# Patient Record
Sex: MALE | Race: WHITE | Employment: PART TIME | URBAN - METROPOLITAN AREA
[De-identification: names, ages, dates, MRNs, and addresses within clinical notes are randomized per-mention and may not be internally consistent; named-entity substitution may affect disease eponyms.]

---

## 2018-01-01 ENCOUNTER — APPOINTMENT (OUTPATIENT)
Dept: GENERAL RADIOLOGY | Age: 83
DRG: 330 | End: 2018-01-01
Attending: FAMILY MEDICINE
Payer: MEDICARE

## 2018-01-01 ENCOUNTER — APPOINTMENT (OUTPATIENT)
Dept: INFUSION THERAPY | Age: 83
End: 2018-01-01

## 2018-01-01 ENCOUNTER — APPOINTMENT (OUTPATIENT)
Dept: GENERAL RADIOLOGY | Age: 83
DRG: 389 | End: 2018-01-01
Attending: FAMILY MEDICINE
Payer: MEDICARE

## 2018-01-01 ENCOUNTER — OFFICE VISIT (OUTPATIENT)
Dept: ONCOLOGY | Age: 83
End: 2018-01-01

## 2018-01-01 ENCOUNTER — HOME CARE VISIT (OUTPATIENT)
Dept: HOME HEALTH SERVICES | Facility: HOME HEALTH | Age: 83
End: 2018-01-01
Payer: MEDICARE

## 2018-01-01 ENCOUNTER — HOME CARE VISIT (OUTPATIENT)
Dept: SCHEDULING | Facility: HOME HEALTH | Age: 83
End: 2018-01-01
Payer: MEDICARE

## 2018-01-01 ENCOUNTER — APPOINTMENT (OUTPATIENT)
Dept: CT IMAGING | Age: 83
DRG: 330 | End: 2018-01-01
Attending: INTERNAL MEDICINE
Payer: MEDICARE

## 2018-01-01 ENCOUNTER — TELEPHONE (OUTPATIENT)
Dept: ONCOLOGY | Age: 83
End: 2018-01-01

## 2018-01-01 ENCOUNTER — APPOINTMENT (OUTPATIENT)
Dept: GENERAL RADIOLOGY | Age: 83
DRG: 389 | End: 2018-01-01
Attending: SURGERY
Payer: MEDICARE

## 2018-01-01 ENCOUNTER — OFFICE VISIT (OUTPATIENT)
Dept: SURGERY | Age: 83
End: 2018-01-01

## 2018-01-01 ENCOUNTER — HOSPITAL ENCOUNTER (OUTPATIENT)
Dept: INFUSION THERAPY | Age: 83
End: 2018-01-01
Payer: MEDICARE

## 2018-01-01 ENCOUNTER — DOCUMENTATION ONLY (OUTPATIENT)
Dept: ONCOLOGY | Age: 83
End: 2018-01-01

## 2018-01-01 ENCOUNTER — HOSPITAL ENCOUNTER (OUTPATIENT)
Age: 83
Discharge: HOME HEALTH | End: 2018-05-16
Attending: PHYSICAL MEDICINE & REHABILITATION | Admitting: PHYSICAL MEDICINE & REHABILITATION

## 2018-01-01 ENCOUNTER — HOSPITAL ENCOUNTER (INPATIENT)
Age: 83
LOS: 6 days | Discharge: NURSING, REHAB OR LONG-TERM CARE | DRG: 330 | End: 2018-05-04
Attending: EMERGENCY MEDICINE | Admitting: FAMILY MEDICINE
Payer: MEDICARE

## 2018-01-01 ENCOUNTER — HOSPITAL ENCOUNTER (OUTPATIENT)
Dept: INFUSION THERAPY | Age: 83
Discharge: HOME | End: 2018-07-05
Payer: MEDICARE

## 2018-01-01 ENCOUNTER — HOME HEALTH ADMISSION (OUTPATIENT)
Dept: HOME HEALTH SERVICES | Facility: HOME HEALTH | Age: 83
End: 2018-01-01
Payer: MEDICARE

## 2018-01-01 ENCOUNTER — APPOINTMENT (OUTPATIENT)
Dept: GENERAL RADIOLOGY | Age: 83
DRG: 389 | End: 2018-01-01
Attending: EMERGENCY MEDICINE
Payer: MEDICARE

## 2018-01-01 ENCOUNTER — APPOINTMENT (OUTPATIENT)
Dept: CT IMAGING | Age: 83
DRG: 312 | End: 2018-01-01
Attending: STUDENT IN AN ORGANIZED HEALTH CARE EDUCATION/TRAINING PROGRAM
Payer: MEDICARE

## 2018-01-01 ENCOUNTER — HOSPITAL ENCOUNTER (INPATIENT)
Age: 83
LOS: 5 days | Discharge: HOME HEALTH | DRG: 389 | End: 2018-07-11
Attending: EMERGENCY MEDICINE | Admitting: FAMILY MEDICINE
Payer: MEDICARE

## 2018-01-01 ENCOUNTER — APPOINTMENT (OUTPATIENT)
Dept: CT IMAGING | Age: 83
DRG: 389 | End: 2018-01-01
Attending: EMERGENCY MEDICINE
Payer: MEDICARE

## 2018-01-01 ENCOUNTER — ANESTHESIA EVENT (OUTPATIENT)
Dept: SURGERY | Age: 83
DRG: 330 | End: 2018-01-01
Payer: MEDICARE

## 2018-01-01 ENCOUNTER — HOSPITAL ENCOUNTER (INPATIENT)
Age: 83
LOS: 4 days | Discharge: HOSPICE | DRG: 312 | End: 2018-07-24
Attending: EMERGENCY MEDICINE | Admitting: INTERNAL MEDICINE
Payer: MEDICARE

## 2018-01-01 ENCOUNTER — HOSPITAL ENCOUNTER (OUTPATIENT)
Dept: INFUSION THERAPY | Age: 83
Discharge: HOME | End: 2018-06-27
Payer: MEDICARE

## 2018-01-01 ENCOUNTER — ANESTHESIA (OUTPATIENT)
Dept: SURGERY | Age: 83
DRG: 330 | End: 2018-01-01
Payer: MEDICARE

## 2018-01-01 ENCOUNTER — APPOINTMENT (OUTPATIENT)
Dept: CT IMAGING | Age: 83
DRG: 312 | End: 2018-01-01
Attending: INTERNAL MEDICINE
Payer: MEDICARE

## 2018-01-01 ENCOUNTER — APPOINTMENT (OUTPATIENT)
Dept: MRI IMAGING | Age: 83
DRG: 312 | End: 2018-01-01
Attending: INTERNAL MEDICINE
Payer: MEDICARE

## 2018-01-01 ENCOUNTER — APPOINTMENT (OUTPATIENT)
Dept: CT IMAGING | Age: 83
DRG: 330 | End: 2018-01-01
Attending: EMERGENCY MEDICINE
Payer: MEDICARE

## 2018-01-01 ENCOUNTER — HOSPITAL ENCOUNTER (OUTPATIENT)
Dept: INTERVENTIONAL RADIOLOGY/VASCULAR | Age: 83
Discharge: HOME | End: 2018-06-22
Attending: INTERNAL MEDICINE
Payer: MEDICARE

## 2018-01-01 ENCOUNTER — TELEPHONE (OUTPATIENT)
Dept: SURGERY | Age: 83
End: 2018-01-01

## 2018-01-01 ENCOUNTER — APPOINTMENT (OUTPATIENT)
Dept: GENERAL RADIOLOGY | Age: 83
DRG: 312 | End: 2018-01-01
Attending: FAMILY MEDICINE
Payer: MEDICARE

## 2018-01-01 ENCOUNTER — APPOINTMENT (OUTPATIENT)
Dept: GENERAL RADIOLOGY | Age: 83
DRG: 330 | End: 2018-01-01
Attending: EMERGENCY MEDICINE
Payer: MEDICARE

## 2018-01-01 VITALS
DIASTOLIC BLOOD PRESSURE: 69 MMHG | BODY MASS INDEX: 27.5 KG/M2 | WEIGHT: 203 LBS | HEIGHT: 72 IN | HEART RATE: 75 BPM | SYSTOLIC BLOOD PRESSURE: 135 MMHG

## 2018-01-01 VITALS
BODY MASS INDEX: 24.77 KG/M2 | HEIGHT: 72 IN | TEMPERATURE: 97.8 F | OXYGEN SATURATION: 97 % | HEART RATE: 60 BPM | SYSTOLIC BLOOD PRESSURE: 165 MMHG | WEIGHT: 182.9 LBS | RESPIRATION RATE: 18 BRPM | DIASTOLIC BLOOD PRESSURE: 80 MMHG

## 2018-01-01 VITALS
SYSTOLIC BLOOD PRESSURE: 154 MMHG | TEMPERATURE: 97.4 F | HEIGHT: 72 IN | OXYGEN SATURATION: 96 % | WEIGHT: 180 LBS | HEART RATE: 52 BPM | RESPIRATION RATE: 22 BRPM | DIASTOLIC BLOOD PRESSURE: 67 MMHG | BODY MASS INDEX: 24.38 KG/M2

## 2018-01-01 VITALS
HEART RATE: 68 BPM | WEIGHT: 179.8 LBS | HEIGHT: 72 IN | OXYGEN SATURATION: 97 % | RESPIRATION RATE: 16 BRPM | DIASTOLIC BLOOD PRESSURE: 70 MMHG | SYSTOLIC BLOOD PRESSURE: 109 MMHG | BODY MASS INDEX: 24.35 KG/M2

## 2018-01-01 VITALS
SYSTOLIC BLOOD PRESSURE: 113 MMHG | HEIGHT: 72 IN | TEMPERATURE: 97.6 F | WEIGHT: 185.44 LBS | RESPIRATION RATE: 16 BRPM | OXYGEN SATURATION: 97 % | DIASTOLIC BLOOD PRESSURE: 74 MMHG | HEART RATE: 70 BPM | BODY MASS INDEX: 25.12 KG/M2

## 2018-01-01 VITALS
BODY MASS INDEX: 24.79 KG/M2 | OXYGEN SATURATION: 99 % | HEART RATE: 66 BPM | DIASTOLIC BLOOD PRESSURE: 60 MMHG | SYSTOLIC BLOOD PRESSURE: 112 MMHG | WEIGHT: 183 LBS | RESPIRATION RATE: 18 BRPM | HEIGHT: 72 IN

## 2018-01-01 VITALS
OXYGEN SATURATION: 98 % | TEMPERATURE: 98 F | SYSTOLIC BLOOD PRESSURE: 122 MMHG | RESPIRATION RATE: 16 BRPM | HEART RATE: 63 BPM | DIASTOLIC BLOOD PRESSURE: 76 MMHG

## 2018-01-01 VITALS
WEIGHT: 185.41 LBS | BODY MASS INDEX: 25.11 KG/M2 | SYSTOLIC BLOOD PRESSURE: 155 MMHG | HEIGHT: 72 IN | RESPIRATION RATE: 18 BRPM | TEMPERATURE: 97.6 F | OXYGEN SATURATION: 98 % | HEART RATE: 82 BPM | DIASTOLIC BLOOD PRESSURE: 79 MMHG

## 2018-01-01 VITALS
HEIGHT: 72 IN | DIASTOLIC BLOOD PRESSURE: 71 MMHG | TEMPERATURE: 98.4 F | OXYGEN SATURATION: 97 % | SYSTOLIC BLOOD PRESSURE: 130 MMHG | HEART RATE: 62 BPM | BODY MASS INDEX: 24.24 KG/M2 | WEIGHT: 179 LBS | RESPIRATION RATE: 16 BRPM

## 2018-01-01 VITALS
RESPIRATION RATE: 18 BRPM | DIASTOLIC BLOOD PRESSURE: 73 MMHG | TEMPERATURE: 97.7 F | SYSTOLIC BLOOD PRESSURE: 115 MMHG | HEIGHT: 72 IN | BODY MASS INDEX: 25.12 KG/M2 | WEIGHT: 185.44 LBS | HEART RATE: 74 BPM | OXYGEN SATURATION: 93 %

## 2018-01-01 VITALS
OXYGEN SATURATION: 98 % | SYSTOLIC BLOOD PRESSURE: 120 MMHG | TEMPERATURE: 98 F | HEART RATE: 77 BPM | DIASTOLIC BLOOD PRESSURE: 76 MMHG

## 2018-01-01 VITALS
HEART RATE: 71 BPM | HEIGHT: 72 IN | RESPIRATION RATE: 16 BRPM | WEIGHT: 184 LBS | TEMPERATURE: 98.3 F | DIASTOLIC BLOOD PRESSURE: 62 MMHG | TEMPERATURE: 98 F | BODY MASS INDEX: 24.92 KG/M2 | HEART RATE: 69 BPM | OXYGEN SATURATION: 98 % | SYSTOLIC BLOOD PRESSURE: 138 MMHG | OXYGEN SATURATION: 96 % | DIASTOLIC BLOOD PRESSURE: 75 MMHG | SYSTOLIC BLOOD PRESSURE: 156 MMHG | RESPIRATION RATE: 18 BRPM

## 2018-01-01 VITALS
BODY MASS INDEX: 25.11 KG/M2 | DIASTOLIC BLOOD PRESSURE: 79 MMHG | TEMPERATURE: 97.3 F | WEIGHT: 185.4 LBS | HEART RATE: 69 BPM | SYSTOLIC BLOOD PRESSURE: 152 MMHG | RESPIRATION RATE: 18 BRPM | OXYGEN SATURATION: 97 % | HEIGHT: 72 IN

## 2018-01-01 VITALS
BODY MASS INDEX: 26.25 KG/M2 | HEIGHT: 72 IN | TEMPERATURE: 99.3 F | SYSTOLIC BLOOD PRESSURE: 154 MMHG | RESPIRATION RATE: 20 BRPM | WEIGHT: 193.8 LBS | HEART RATE: 74 BPM | OXYGEN SATURATION: 97 % | DIASTOLIC BLOOD PRESSURE: 71 MMHG

## 2018-01-01 VITALS
RESPIRATION RATE: 16 BRPM | TEMPERATURE: 98 F | DIASTOLIC BLOOD PRESSURE: 60 MMHG | OXYGEN SATURATION: 96 % | HEART RATE: 70 BPM | SYSTOLIC BLOOD PRESSURE: 100 MMHG

## 2018-01-01 VITALS
SYSTOLIC BLOOD PRESSURE: 127 MMHG | RESPIRATION RATE: 16 BRPM | DIASTOLIC BLOOD PRESSURE: 80 MMHG | WEIGHT: 182 LBS | BODY MASS INDEX: 24.65 KG/M2 | HEART RATE: 82 BPM | TEMPERATURE: 97.8 F | OXYGEN SATURATION: 95 % | HEIGHT: 72 IN

## 2018-01-01 DIAGNOSIS — Z71.89 COUNSELING REGARDING ADVANCED CARE PLANNING AND GOALS OF CARE: ICD-10-CM

## 2018-01-01 DIAGNOSIS — I67.89 CEREBRAL MICROVASCULAR DISEASE: ICD-10-CM

## 2018-01-01 DIAGNOSIS — C25.9 METASTASIS FROM PANCREATIC CANCER (HCC): ICD-10-CM

## 2018-01-01 DIAGNOSIS — C78.00 PANCREATIC CANCER METASTASIZED TO LUNG (HCC): Primary | ICD-10-CM

## 2018-01-01 DIAGNOSIS — C25.9 PRIMARY PANCREATIC CANCER WITH METASTASIS TO OTHER SITE (HCC): ICD-10-CM

## 2018-01-01 DIAGNOSIS — R53.81 DEBILITY: ICD-10-CM

## 2018-01-01 DIAGNOSIS — D49.0 NEOPLASM OF SIGMOID COLON: ICD-10-CM

## 2018-01-01 DIAGNOSIS — G45.9 TRANSIENT CEREBRAL ISCHEMIA, UNSPECIFIED TYPE: Primary | ICD-10-CM

## 2018-01-01 DIAGNOSIS — R55 CONVULSIVE SYNCOPE: ICD-10-CM

## 2018-01-01 DIAGNOSIS — K40.31: Primary | ICD-10-CM

## 2018-01-01 DIAGNOSIS — K56.609 SBO (SMALL BOWEL OBSTRUCTION) (HCC): ICD-10-CM

## 2018-01-01 DIAGNOSIS — C79.9 METASTASIS FROM PANCREATIC CANCER (HCC): ICD-10-CM

## 2018-01-01 DIAGNOSIS — C25.9 PANCREATIC CANCER METASTASIZED TO LUNG (HCC): Primary | ICD-10-CM

## 2018-01-01 DIAGNOSIS — R53.1 WEAKNESS: ICD-10-CM

## 2018-01-01 DIAGNOSIS — C25.9 PRIMARY PANCREATIC CANCER WITH METASTASIS TO OTHER SITE (HCC): Primary | ICD-10-CM

## 2018-01-01 DIAGNOSIS — I65.23 BILATERAL CAROTID ARTERY STENOSIS: ICD-10-CM

## 2018-01-01 DIAGNOSIS — K86.89 PANCREATIC MASS: ICD-10-CM

## 2018-01-01 DIAGNOSIS — K46.0 INCARCERATED HERNIA: Primary | ICD-10-CM

## 2018-01-01 DIAGNOSIS — R79.89 ELEVATED SERUM CREATININE: ICD-10-CM

## 2018-01-01 DIAGNOSIS — R47.1 DYSARTHRIA: ICD-10-CM

## 2018-01-01 DIAGNOSIS — R11.2 CINV (CHEMOTHERAPY-INDUCED NAUSEA AND VOMITING): ICD-10-CM

## 2018-01-01 DIAGNOSIS — K40.31: ICD-10-CM

## 2018-01-01 DIAGNOSIS — E87.6 HYPOKALEMIA: ICD-10-CM

## 2018-01-01 DIAGNOSIS — R41.82 ALTERED MENTAL STATUS, UNSPECIFIED ALTERED MENTAL STATUS TYPE: ICD-10-CM

## 2018-01-01 DIAGNOSIS — T45.1X5A CINV (CHEMOTHERAPY-INDUCED NAUSEA AND VOMITING): ICD-10-CM

## 2018-01-01 LAB
25(OH)D2 SERPL-MCNC: <1 NG/ML
25(OH)D3 SERPL-MCNC: 39 NG/ML
25(OH)D3+25(OH)D2 SERPL-MCNC: 39 NG/ML
ABO + RH BLD: NORMAL
ALBUMIN SERPL-MCNC: 2.3 G/DL (ref 3.5–5)
ALBUMIN SERPL-MCNC: 2.4 G/DL (ref 3.5–5)
ALBUMIN SERPL-MCNC: 2.5 G/DL (ref 3.5–5)
ALBUMIN SERPL-MCNC: 2.6 G/DL (ref 3.5–5)
ALBUMIN SERPL-MCNC: 2.7 G/DL (ref 3.5–5)
ALBUMIN SERPL-MCNC: 2.8 G/DL (ref 3.5–5)
ALBUMIN SERPL-MCNC: 2.9 G/DL (ref 3.5–5)
ALBUMIN SERPL-MCNC: 3 G/DL (ref 3.5–5)
ALBUMIN SERPL-MCNC: 3.1 G/DL (ref 3.5–5)
ALBUMIN SERPL-MCNC: 3.5 G/DL (ref 3.5–5)
ALBUMIN/GLOB SERPL: 0.7 {RATIO} (ref 1.1–2.2)
ALBUMIN/GLOB SERPL: 0.7 {RATIO} (ref 1.1–2.2)
ALBUMIN/GLOB SERPL: 0.8 {RATIO} (ref 1.1–2.2)
ALBUMIN/GLOB SERPL: 0.9 {RATIO} (ref 1.1–2.2)
ALBUMIN/GLOB SERPL: 1 {RATIO} (ref 1.1–2.2)
ALP SERPL-CCNC: 149 U/L (ref 45–117)
ALP SERPL-CCNC: 51 U/L (ref 45–117)
ALP SERPL-CCNC: 55 U/L (ref 45–117)
ALP SERPL-CCNC: 57 U/L (ref 45–117)
ALP SERPL-CCNC: 61 U/L (ref 45–117)
ALP SERPL-CCNC: 69 U/L (ref 45–117)
ALP SERPL-CCNC: 76 U/L (ref 45–117)
ALP SERPL-CCNC: 83 U/L (ref 45–117)
ALP SERPL-CCNC: 86 U/L (ref 45–117)
ALP SERPL-CCNC: 87 U/L (ref 45–117)
ALT SERPL-CCNC: 14 U/L (ref 12–78)
ALT SERPL-CCNC: 15 U/L (ref 12–78)
ALT SERPL-CCNC: 17 U/L (ref 12–78)
ALT SERPL-CCNC: 17 U/L (ref 12–78)
ALT SERPL-CCNC: 26 U/L (ref 12–78)
ALT SERPL-CCNC: 27 U/L (ref 12–78)
ALT SERPL-CCNC: 31 U/L (ref 12–78)
ANA SER QL: NEGATIVE
ANION GAP SERPL CALC-SCNC: 10 MMOL/L (ref 5–15)
ANION GAP SERPL CALC-SCNC: 12 MMOL/L (ref 5–15)
ANION GAP SERPL CALC-SCNC: 13 MMOL/L (ref 5–15)
ANION GAP SERPL CALC-SCNC: 5 MMOL/L (ref 5–15)
ANION GAP SERPL CALC-SCNC: 7 MMOL/L (ref 5–15)
ANION GAP SERPL CALC-SCNC: 8 MMOL/L (ref 5–15)
ANION GAP SERPL CALC-SCNC: 8 MMOL/L (ref 5–15)
ANION GAP SERPL CALC-SCNC: 9 MMOL/L (ref 5–15)
APPEARANCE UR: CLEAR
AST SERPL-CCNC: 10 U/L (ref 15–37)
AST SERPL-CCNC: 13 U/L (ref 15–37)
AST SERPL-CCNC: 13 U/L (ref 15–37)
AST SERPL-CCNC: 15 U/L (ref 15–37)
AST SERPL-CCNC: 15 U/L (ref 15–37)
AST SERPL-CCNC: 17 U/L (ref 15–37)
AST SERPL-CCNC: 18 U/L (ref 15–37)
AST SERPL-CCNC: 21 U/L (ref 15–37)
AST SERPL-CCNC: 24 U/L (ref 15–37)
AST SERPL-CCNC: 30 U/L (ref 15–37)
ATRIAL RATE: 59 BPM
ATRIAL RATE: 77 BPM
ATRIAL RATE: 81 BPM
BACTERIA SPEC CULT: NORMAL
BACTERIA SPEC CULT: NORMAL
BACTERIA URNS QL MICRO: NEGATIVE /HPF
BASO+EOS+MONOS # BLD AUTO: 0.3 K/UL (ref 0.2–1.2)
BASO+EOS+MONOS # BLD AUTO: 0.8 K/UL (ref 0.2–1.2)
BASO+EOS+MONOS # BLD AUTO: 12 % (ref 3.2–16.9)
BASO+EOS+MONOS # BLD AUTO: 7 % (ref 3.2–16.9)
BASOPHILS # BLD: 0 K/UL (ref 0–0.1)
BASOPHILS # BLD: 0.1 K/UL (ref 0–0.1)
BASOPHILS # BLD: 0.1 K/UL (ref 0–0.1)
BASOPHILS NFR BLD: 0 % (ref 0–1)
BASOPHILS NFR BLD: 1 % (ref 0–1)
BILIRUB DIRECT SERPL-MCNC: 0.2 MG/DL (ref 0–0.2)
BILIRUB SERPL-MCNC: 0.8 MG/DL (ref 0.2–1)
BILIRUB SERPL-MCNC: 1.1 MG/DL (ref 0.2–1)
BILIRUB SERPL-MCNC: 1.1 MG/DL (ref 0.2–1)
BILIRUB SERPL-MCNC: 1.2 MG/DL (ref 0.2–1)
BILIRUB SERPL-MCNC: 1.6 MG/DL (ref 0.2–1)
BILIRUB SERPL-MCNC: 1.7 MG/DL (ref 0.2–1)
BILIRUB SERPL-MCNC: 1.9 MG/DL (ref 0.2–1)
BILIRUB SERPL-MCNC: 2.3 MG/DL (ref 0.2–1)
BILIRUB UR QL CFM: NEGATIVE
BILIRUB UR QL: NEGATIVE
BLOOD GROUP ANTIBODIES SERPL: NORMAL
BUN SERPL-MCNC: 15 MG/DL (ref 6–20)
BUN SERPL-MCNC: 17 MG/DL (ref 6–20)
BUN SERPL-MCNC: 17 MG/DL (ref 6–20)
BUN SERPL-MCNC: 18 MG/DL (ref 6–20)
BUN SERPL-MCNC: 19 MG/DL (ref 6–20)
BUN SERPL-MCNC: 21 MG/DL (ref 6–20)
BUN SERPL-MCNC: 22 MG/DL (ref 6–20)
BUN SERPL-MCNC: 23 MG/DL (ref 6–20)
BUN SERPL-MCNC: 24 MG/DL (ref 6–20)
BUN SERPL-MCNC: 25 MG/DL (ref 6–20)
BUN SERPL-MCNC: 25 MG/DL (ref 6–20)
BUN SERPL-MCNC: 26 MG/DL (ref 6–20)
BUN SERPL-MCNC: 27 MG/DL (ref 6–20)
BUN SERPL-MCNC: 28 MG/DL (ref 6–20)
BUN SERPL-MCNC: 28 MG/DL (ref 6–20)
BUN SERPL-MCNC: 30 MG/DL (ref 6–20)
BUN SERPL-MCNC: 32 MG/DL (ref 6–20)
BUN SERPL-MCNC: 32 MG/DL (ref 6–20)
BUN SERPL-MCNC: 33 MG/DL (ref 6–20)
BUN SERPL-MCNC: 35 MG/DL (ref 6–20)
BUN/CREAT SERPL: 15 (ref 12–20)
BUN/CREAT SERPL: 16 (ref 12–20)
BUN/CREAT SERPL: 16 (ref 12–20)
BUN/CREAT SERPL: 17 (ref 12–20)
BUN/CREAT SERPL: 18 (ref 12–20)
BUN/CREAT SERPL: 19 (ref 12–20)
BUN/CREAT SERPL: 20 (ref 12–20)
BUN/CREAT SERPL: 22 (ref 12–20)
BUN/CREAT SERPL: 23 (ref 12–20)
BUN/CREAT SERPL: 25 (ref 12–20)
BUN/CREAT SERPL: 25 (ref 12–20)
BUN/CREAT SERPL: 27 (ref 12–20)
BUN/CREAT SERPL: 27 (ref 12–20)
BUN/CREAT SERPL: 29 (ref 12–20)
BUN/CREAT SERPL: 34 (ref 12–20)
CALCIUM SERPL-MCNC: 7.8 MG/DL (ref 8.5–10.1)
CALCIUM SERPL-MCNC: 8.1 MG/DL (ref 8.5–10.1)
CALCIUM SERPL-MCNC: 8.1 MG/DL (ref 8.5–10.1)
CALCIUM SERPL-MCNC: 8.2 MG/DL (ref 8.5–10.1)
CALCIUM SERPL-MCNC: 8.3 MG/DL (ref 8.5–10.1)
CALCIUM SERPL-MCNC: 8.4 MG/DL (ref 8.5–10.1)
CALCIUM SERPL-MCNC: 8.5 MG/DL (ref 8.5–10.1)
CALCIUM SERPL-MCNC: 8.8 MG/DL (ref 8.5–10.1)
CALCIUM SERPL-MCNC: 8.9 MG/DL (ref 8.5–10.1)
CALCIUM SERPL-MCNC: 9 MG/DL (ref 8.5–10.1)
CALCIUM SERPL-MCNC: 9 MG/DL (ref 8.5–10.1)
CALCIUM SERPL-MCNC: 9.4 MG/DL (ref 8.5–10.1)
CALCIUM SERPL-MCNC: 9.4 MG/DL (ref 8.5–10.1)
CALCIUM SERPL-MCNC: 9.6 MG/DL (ref 8.5–10.1)
CALCULATED P AXIS, ECG09: -11 DEGREES
CALCULATED P AXIS, ECG09: 102 DEGREES
CALCULATED P AXIS, ECG09: 17 DEGREES
CALCULATED R AXIS, ECG10: -121 DEGREES
CALCULATED R AXIS, ECG10: -95 DEGREES
CALCULATED R AXIS, ECG10: -99 DEGREES
CALCULATED T AXIS, ECG11: -36 DEGREES
CALCULATED T AXIS, ECG11: -76 DEGREES
CALCULATED T AXIS, ECG11: 39 DEGREES
CANCER AG19-9 SERPL-ACNC: 1222 U/ML (ref 0–35)
CC UR VC: NORMAL
CC UR VC: NORMAL
CHLORIDE SERPL-SCNC: 100 MMOL/L (ref 97–108)
CHLORIDE SERPL-SCNC: 100 MMOL/L (ref 97–108)
CHLORIDE SERPL-SCNC: 101 MMOL/L (ref 97–108)
CHLORIDE SERPL-SCNC: 101 MMOL/L (ref 97–108)
CHLORIDE SERPL-SCNC: 102 MMOL/L (ref 97–108)
CHLORIDE SERPL-SCNC: 103 MMOL/L (ref 97–108)
CHLORIDE SERPL-SCNC: 104 MMOL/L (ref 97–108)
CHLORIDE SERPL-SCNC: 105 MMOL/L (ref 97–108)
CHLORIDE SERPL-SCNC: 106 MMOL/L (ref 97–108)
CHLORIDE SERPL-SCNC: 108 MMOL/L (ref 97–108)
CHLORIDE SERPL-SCNC: 111 MMOL/L (ref 97–108)
CHLORIDE SERPL-SCNC: 113 MMOL/L (ref 97–108)
CHLORIDE SERPL-SCNC: 96 MMOL/L (ref 97–108)
CHLORIDE SERPL-SCNC: 96 MMOL/L (ref 97–108)
CHLORIDE SERPL-SCNC: 97 MMOL/L (ref 97–108)
CHLORIDE SERPL-SCNC: 98 MMOL/L (ref 97–108)
CHLORIDE SERPL-SCNC: 99 MMOL/L (ref 97–108)
CHOLEST SERPL-MCNC: 140 MG/DL
CK MB CFR SERPL CALC: 6.3 % (ref 0–2.5)
CK MB SERPL-MCNC: 2 NG/ML (ref 5–25)
CK SERPL-CCNC: 32 U/L (ref 39–308)
CO2 SERPL-SCNC: 21 MMOL/L (ref 21–32)
CO2 SERPL-SCNC: 21 MMOL/L (ref 21–32)
CO2 SERPL-SCNC: 22 MMOL/L (ref 21–32)
CO2 SERPL-SCNC: 23 MMOL/L (ref 21–32)
CO2 SERPL-SCNC: 24 MMOL/L (ref 21–32)
CO2 SERPL-SCNC: 24 MMOL/L (ref 21–32)
CO2 SERPL-SCNC: 25 MMOL/L (ref 21–32)
CO2 SERPL-SCNC: 27 MMOL/L (ref 21–32)
CO2 SERPL-SCNC: 28 MMOL/L (ref 21–32)
CO2 SERPL-SCNC: 28 MMOL/L (ref 21–32)
COLOR UR: ABNORMAL
COLOR UR: NORMAL
CREAT SERPL-MCNC: 0.88 MG/DL (ref 0.7–1.3)
CREAT SERPL-MCNC: 0.95 MG/DL (ref 0.7–1.3)
CREAT SERPL-MCNC: 1 MG/DL (ref 0.7–1.3)
CREAT SERPL-MCNC: 1.03 MG/DL (ref 0.7–1.3)
CREAT SERPL-MCNC: 1.03 MG/DL (ref 0.7–1.3)
CREAT SERPL-MCNC: 1.08 MG/DL (ref 0.7–1.3)
CREAT SERPL-MCNC: 1.09 MG/DL (ref 0.7–1.3)
CREAT SERPL-MCNC: 1.11 MG/DL (ref 0.7–1.3)
CREAT SERPL-MCNC: 1.11 MG/DL (ref 0.7–1.3)
CREAT SERPL-MCNC: 1.12 MG/DL (ref 0.7–1.3)
CREAT SERPL-MCNC: 1.13 MG/DL (ref 0.7–1.3)
CREAT SERPL-MCNC: 1.14 MG/DL (ref 0.7–1.3)
CREAT SERPL-MCNC: 1.15 MG/DL (ref 0.7–1.3)
CREAT SERPL-MCNC: 1.19 MG/DL (ref 0.7–1.3)
CREAT SERPL-MCNC: 1.22 MG/DL (ref 0.7–1.3)
CREAT SERPL-MCNC: 1.23 MG/DL (ref 0.7–1.3)
CREAT SERPL-MCNC: 1.23 MG/DL (ref 0.7–1.3)
CREAT SERPL-MCNC: 1.24 MG/DL (ref 0.7–1.3)
CREAT SERPL-MCNC: 1.28 MG/DL (ref 0.7–1.3)
CREAT SERPL-MCNC: 1.33 MG/DL (ref 0.7–1.3)
CREAT SERPL-MCNC: 1.41 MG/DL (ref 0.7–1.3)
DIAGNOSIS, 93000: NORMAL
DIFFERENTIAL METHOD BLD: ABNORMAL
EOSINOPHIL # BLD: 0 K/UL (ref 0–0.4)
EOSINOPHIL # BLD: 0.1 K/UL (ref 0–0.4)
EOSINOPHIL # BLD: 0.2 K/UL (ref 0–0.4)
EOSINOPHIL # BLD: 0.3 K/UL (ref 0–0.4)
EOSINOPHIL NFR BLD: 0 % (ref 0–7)
EOSINOPHIL NFR BLD: 1 % (ref 0–7)
EOSINOPHIL NFR BLD: 3 % (ref 0–7)
EOSINOPHIL NFR BLD: 3 % (ref 0–7)
EPITH CASTS URNS QL MICRO: ABNORMAL /LPF
EPITH CASTS URNS QL MICRO: NORMAL /LPF
ERYTHROCYTE [DISTWIDTH] IN BLOOD BY AUTOMATED COUNT: 11.9 % (ref 11.5–14.5)
ERYTHROCYTE [DISTWIDTH] IN BLOOD BY AUTOMATED COUNT: 12.2 % (ref 11.5–14.5)
ERYTHROCYTE [DISTWIDTH] IN BLOOD BY AUTOMATED COUNT: 12.2 % (ref 11.5–14.5)
ERYTHROCYTE [DISTWIDTH] IN BLOOD BY AUTOMATED COUNT: 12.3 % (ref 11.5–14.5)
ERYTHROCYTE [DISTWIDTH] IN BLOOD BY AUTOMATED COUNT: 12.4 % (ref 11.5–14.5)
ERYTHROCYTE [DISTWIDTH] IN BLOOD BY AUTOMATED COUNT: 12.7 % (ref 11.5–14.5)
ERYTHROCYTE [DISTWIDTH] IN BLOOD BY AUTOMATED COUNT: 12.8 % (ref 11.5–14.5)
ERYTHROCYTE [DISTWIDTH] IN BLOOD BY AUTOMATED COUNT: 12.9 % (ref 11.8–15.8)
ERYTHROCYTE [DISTWIDTH] IN BLOOD BY AUTOMATED COUNT: 13 % (ref 11.5–14.5)
ERYTHROCYTE [DISTWIDTH] IN BLOOD BY AUTOMATED COUNT: 13.1 % (ref 11.8–15.8)
ERYTHROCYTE [DISTWIDTH] IN BLOOD BY AUTOMATED COUNT: 13.2 % (ref 11.5–14.5)
ERYTHROCYTE [DISTWIDTH] IN BLOOD BY AUTOMATED COUNT: 13.6 % (ref 11.5–14.5)
ERYTHROCYTE [DISTWIDTH] IN BLOOD BY AUTOMATED COUNT: 13.6 % (ref 11.5–14.5)
ERYTHROCYTE [DISTWIDTH] IN BLOOD BY AUTOMATED COUNT: 14.1 % (ref 11.5–14.5)
ERYTHROCYTE [DISTWIDTH] IN BLOOD BY AUTOMATED COUNT: 14.2 % (ref 11.5–14.5)
ERYTHROCYTE [DISTWIDTH] IN BLOOD BY AUTOMATED COUNT: 14.5 % (ref 11.5–14.5)
ERYTHROCYTE [DISTWIDTH] IN BLOOD BY AUTOMATED COUNT: 14.6 % (ref 11.5–14.5)
ERYTHROCYTE [DISTWIDTH] IN BLOOD BY AUTOMATED COUNT: 14.6 % (ref 11.5–14.5)
ERYTHROCYTE [DISTWIDTH] IN BLOOD BY AUTOMATED COUNT: 14.8 % (ref 11.5–14.5)
ERYTHROCYTE [SEDIMENTATION RATE] IN BLOOD: 36 MM/HR (ref 0–20)
EST. AVERAGE GLUCOSE BLD GHB EST-MCNC: 148 MG/DL
GLOBULIN SER CALC-MCNC: 2.8 G/DL (ref 2–4)
GLOBULIN SER CALC-MCNC: 2.9 G/DL (ref 2–4)
GLOBULIN SER CALC-MCNC: 3 G/DL (ref 2–4)
GLOBULIN SER CALC-MCNC: 3.1 G/DL (ref 2–4)
GLOBULIN SER CALC-MCNC: 3.2 G/DL (ref 2–4)
GLOBULIN SER CALC-MCNC: 3.3 G/DL (ref 2–4)
GLOBULIN SER CALC-MCNC: 3.5 G/DL (ref 2–4)
GLOBULIN SER CALC-MCNC: 3.5 G/DL (ref 2–4)
GLUCOSE SERPL-MCNC: 117 MG/DL (ref 65–100)
GLUCOSE SERPL-MCNC: 121 MG/DL (ref 65–100)
GLUCOSE SERPL-MCNC: 121 MG/DL (ref 65–100)
GLUCOSE SERPL-MCNC: 122 MG/DL (ref 65–100)
GLUCOSE SERPL-MCNC: 122 MG/DL (ref 65–100)
GLUCOSE SERPL-MCNC: 126 MG/DL (ref 65–100)
GLUCOSE SERPL-MCNC: 129 MG/DL (ref 65–100)
GLUCOSE SERPL-MCNC: 129 MG/DL (ref 65–100)
GLUCOSE SERPL-MCNC: 135 MG/DL (ref 65–100)
GLUCOSE SERPL-MCNC: 136 MG/DL (ref 65–100)
GLUCOSE SERPL-MCNC: 137 MG/DL (ref 65–100)
GLUCOSE SERPL-MCNC: 138 MG/DL (ref 65–100)
GLUCOSE SERPL-MCNC: 139 MG/DL (ref 65–100)
GLUCOSE SERPL-MCNC: 145 MG/DL (ref 65–100)
GLUCOSE SERPL-MCNC: 152 MG/DL (ref 65–100)
GLUCOSE SERPL-MCNC: 154 MG/DL (ref 65–100)
GLUCOSE SERPL-MCNC: 165 MG/DL (ref 65–100)
GLUCOSE SERPL-MCNC: 167 MG/DL (ref 65–100)
GLUCOSE SERPL-MCNC: 167 MG/DL (ref 65–100)
GLUCOSE SERPL-MCNC: 173 MG/DL (ref 65–100)
GLUCOSE SERPL-MCNC: 183 MG/DL (ref 65–100)
GLUCOSE SERPL-MCNC: 190 MG/DL (ref 65–100)
GLUCOSE SERPL-MCNC: 90 MG/DL (ref 65–100)
GLUCOSE SERPL-MCNC: 96 MG/DL (ref 65–100)
GLUCOSE UR STRIP.AUTO-MCNC: NEGATIVE MG/DL
HBA1C MFR BLD: 6.8 % (ref 4.2–6.3)
HCT VFR BLD AUTO: 30.1 % (ref 36.6–50.3)
HCT VFR BLD AUTO: 31.5 % (ref 36.6–50.3)
HCT VFR BLD AUTO: 31.6 % (ref 36.6–50.3)
HCT VFR BLD AUTO: 32.5 % (ref 36.6–50.3)
HCT VFR BLD AUTO: 32.7 % (ref 36.6–50.3)
HCT VFR BLD AUTO: 32.8 % (ref 36.6–50.3)
HCT VFR BLD AUTO: 33.4 % (ref 36.6–50.3)
HCT VFR BLD AUTO: 33.4 % (ref 36.6–50.3)
HCT VFR BLD AUTO: 33.5 % (ref 36.6–50.3)
HCT VFR BLD AUTO: 34.2 % (ref 36.6–50.3)
HCT VFR BLD AUTO: 34.9 % (ref 36.6–50.3)
HCT VFR BLD AUTO: 35.8 % (ref 36.6–50.3)
HCT VFR BLD AUTO: 35.9 % (ref 36.6–50.3)
HCT VFR BLD AUTO: 36.1 % (ref 36.6–50.3)
HCT VFR BLD AUTO: 36.3 % (ref 36.6–50.3)
HCT VFR BLD AUTO: 36.7 % (ref 36.6–50.3)
HCT VFR BLD AUTO: 37.1 % (ref 36.6–50.3)
HCT VFR BLD AUTO: 38.6 % (ref 36.6–50.3)
HCT VFR BLD AUTO: 42.5 % (ref 36.6–50.3)
HCYS SERPL-SCNC: 10.7 UMOL/L (ref 3.7–13.9)
HDLC SERPL-MCNC: 36 MG/DL
HDLC SERPL: 3.9 {RATIO} (ref 0–5)
HGB BLD-MCNC: 10.5 G/DL (ref 12.1–17)
HGB BLD-MCNC: 10.5 G/DL (ref 12.1–17)
HGB BLD-MCNC: 10.8 G/DL (ref 12.1–17)
HGB BLD-MCNC: 11.1 G/DL (ref 12.1–17)
HGB BLD-MCNC: 11.1 G/DL (ref 12.1–17)
HGB BLD-MCNC: 11.3 G/DL (ref 12.1–17)
HGB BLD-MCNC: 11.3 G/DL (ref 12.1–17)
HGB BLD-MCNC: 11.5 G/DL (ref 12.1–17)
HGB BLD-MCNC: 11.8 G/DL (ref 12.1–17)
HGB BLD-MCNC: 12.3 G/DL (ref 12.1–17)
HGB BLD-MCNC: 12.4 G/DL (ref 12.1–17)
HGB BLD-MCNC: 12.5 G/DL (ref 12.1–17)
HGB BLD-MCNC: 12.7 G/DL (ref 12.1–17)
HGB BLD-MCNC: 13.3 G/DL (ref 12.1–17)
HGB BLD-MCNC: 15.2 G/DL (ref 12.1–17)
HGB UR QL STRIP: ABNORMAL
HGB UR QL STRIP: ABNORMAL
HGB UR QL STRIP: NEGATIVE
HGB UR QL STRIP: NEGATIVE
HYALINE CASTS URNS QL MICRO: ABNORMAL /LPF (ref 0–5)
HYALINE CASTS URNS QL MICRO: NORMAL /LPF (ref 0–5)
IMM GRANULOCYTES # BLD: 0 K/UL (ref 0–0.04)
IMM GRANULOCYTES # BLD: 0.1 K/UL (ref 0–0.04)
IMM GRANULOCYTES NFR BLD AUTO: 0 % (ref 0–0.5)
IMM GRANULOCYTES NFR BLD AUTO: 1 % (ref 0–0.5)
INR PPP: 1.1 (ref 0.9–1.1)
KETONES UR QL STRIP.AUTO: ABNORMAL MG/DL
KETONES UR QL STRIP.AUTO: NEGATIVE MG/DL
LACTATE SERPL-SCNC: 1 MMOL/L (ref 0.4–2)
LACTATE SERPL-SCNC: 1.7 MMOL/L (ref 0.4–2)
LACTATE SERPL-SCNC: 1.9 MMOL/L (ref 0.4–2)
LDLC SERPL CALC-MCNC: 74 MG/DL (ref 0–100)
LEUKOCYTE ESTERASE UR QL STRIP.AUTO: ABNORMAL
LEUKOCYTE ESTERASE UR QL STRIP.AUTO: NEGATIVE
LIPASE SERPL-CCNC: 43 U/L (ref 73–393)
LIPASE SERPL-CCNC: 43 U/L (ref 73–393)
LIPASE SERPL-CCNC: 50 U/L (ref 73–393)
LIPID PROFILE,FLP: ABNORMAL
LYMPHOCYTES # BLD: 0.6 K/UL (ref 0.8–3.5)
LYMPHOCYTES # BLD: 0.6 K/UL (ref 0.8–3.5)
LYMPHOCYTES # BLD: 0.7 K/UL (ref 0.8–3.5)
LYMPHOCYTES # BLD: 0.9 K/UL (ref 0.8–3.5)
LYMPHOCYTES # BLD: 1 K/UL (ref 0.8–3.5)
LYMPHOCYTES # BLD: 1 K/UL (ref 0.8–3.5)
LYMPHOCYTES # BLD: 1.1 K/UL (ref 0.8–3.5)
LYMPHOCYTES # BLD: 1.2 K/UL (ref 0.8–3.5)
LYMPHOCYTES # BLD: 1.3 K/UL (ref 0.8–3.5)
LYMPHOCYTES # BLD: 1.3 K/UL (ref 0.8–3.5)
LYMPHOCYTES # BLD: 1.7 K/UL (ref 0.8–3.5)
LYMPHOCYTES # BLD: 2.8 K/UL (ref 0.8–3.5)
LYMPHOCYTES NFR BLD: 10 % (ref 12–49)
LYMPHOCYTES NFR BLD: 12 % (ref 12–49)
LYMPHOCYTES NFR BLD: 13 % (ref 12–49)
LYMPHOCYTES NFR BLD: 15 % (ref 12–49)
LYMPHOCYTES NFR BLD: 15 % (ref 12–49)
LYMPHOCYTES NFR BLD: 17 % (ref 12–49)
LYMPHOCYTES NFR BLD: 18 % (ref 12–49)
LYMPHOCYTES NFR BLD: 21 % (ref 12–49)
LYMPHOCYTES NFR BLD: 28 % (ref 12–49)
LYMPHOCYTES NFR BLD: 5 % (ref 12–49)
LYMPHOCYTES NFR BLD: 7 % (ref 12–49)
LYMPHOCYTES NFR BLD: 9 % (ref 12–49)
MAGNESIUM SERPL-MCNC: 1.4 MG/DL (ref 1.6–2.4)
MAGNESIUM SERPL-MCNC: 1.6 MG/DL (ref 1.6–2.4)
MAGNESIUM SERPL-MCNC: 1.6 MG/DL (ref 1.6–2.4)
MAGNESIUM SERPL-MCNC: 1.7 MG/DL (ref 1.6–2.4)
MAGNESIUM SERPL-MCNC: 1.8 MG/DL (ref 1.6–2.4)
MAGNESIUM SERPL-MCNC: 1.9 MG/DL (ref 1.6–2.4)
MCH RBC QN AUTO: 34.2 PG (ref 26–34)
MCH RBC QN AUTO: 34.3 PG (ref 26–34)
MCH RBC QN AUTO: 34.4 PG (ref 26–34)
MCH RBC QN AUTO: 34.5 PG (ref 26–34)
MCH RBC QN AUTO: 34.7 PG (ref 26–34)
MCH RBC QN AUTO: 34.8 PG (ref 26–34)
MCH RBC QN AUTO: 35 PG (ref 26–34)
MCH RBC QN AUTO: 35.1 PG (ref 26–34)
MCH RBC QN AUTO: 35.1 PG (ref 26–34)
MCH RBC QN AUTO: 35.3 PG (ref 26–34)
MCH RBC QN AUTO: 35.4 PG (ref 26–34)
MCH RBC QN AUTO: 35.4 PG (ref 26–34)
MCH RBC QN AUTO: 35.5 PG (ref 26–34)
MCHC RBC AUTO-ENTMCNC: 33.2 G/DL (ref 30–36.5)
MCHC RBC AUTO-ENTMCNC: 33.3 G/DL (ref 30–36.5)
MCHC RBC AUTO-ENTMCNC: 33.5 G/DL (ref 30–36.5)
MCHC RBC AUTO-ENTMCNC: 33.8 G/DL (ref 30–36.5)
MCHC RBC AUTO-ENTMCNC: 34.2 G/DL (ref 30–36.5)
MCHC RBC AUTO-ENTMCNC: 34.3 G/DL (ref 30–36.5)
MCHC RBC AUTO-ENTMCNC: 34.3 G/DL (ref 30–36.5)
MCHC RBC AUTO-ENTMCNC: 34.4 G/DL (ref 30–36.5)
MCHC RBC AUTO-ENTMCNC: 34.4 G/DL (ref 30–36.5)
MCHC RBC AUTO-ENTMCNC: 34.5 G/DL (ref 30–36.5)
MCHC RBC AUTO-ENTMCNC: 34.6 G/DL (ref 30–36.5)
MCHC RBC AUTO-ENTMCNC: 34.9 G/DL (ref 30–36.5)
MCHC RBC AUTO-ENTMCNC: 35.1 G/DL (ref 30–36.5)
MCHC RBC AUTO-ENTMCNC: 35.2 G/DL (ref 30–36.5)
MCHC RBC AUTO-ENTMCNC: 35.2 G/DL (ref 30–36.5)
MCHC RBC AUTO-ENTMCNC: 35.8 G/DL (ref 30–36.5)
MCHC RBC AUTO-ENTMCNC: 36 G/DL (ref 30–36.5)
MCV RBC AUTO: 100 FL (ref 80–99)
MCV RBC AUTO: 100.3 FL (ref 80–99)
MCV RBC AUTO: 100.9 FL (ref 80–99)
MCV RBC AUTO: 101.5 FL (ref 80–99)
MCV RBC AUTO: 101.6 FL (ref 80–99)
MCV RBC AUTO: 101.6 FL (ref 80–99)
MCV RBC AUTO: 101.7 FL (ref 80–99)
MCV RBC AUTO: 101.7 FL (ref 80–99)
MCV RBC AUTO: 102 FL (ref 80–99)
MCV RBC AUTO: 102.8 FL (ref 80–99)
MCV RBC AUTO: 103.1 FL (ref 80–99)
MCV RBC AUTO: 103.1 FL (ref 80–99)
MCV RBC AUTO: 103.3 FL (ref 80–99)
MCV RBC AUTO: 103.7 FL (ref 80–99)
MCV RBC AUTO: 98.8 FL (ref 80–99)
MCV RBC AUTO: 98.9 FL (ref 80–99)
MCV RBC AUTO: 99.1 FL (ref 80–99)
MCV RBC AUTO: 99.7 FL (ref 80–99)
MCV RBC AUTO: 99.7 FL (ref 80–99)
METAMYELOCYTES NFR BLD MANUAL: 1 %
MONOCYTES # BLD: 0.7 K/UL (ref 0–1)
MONOCYTES # BLD: 0.7 K/UL (ref 0–1)
MONOCYTES # BLD: 0.9 K/UL (ref 0–1)
MONOCYTES # BLD: 0.9 K/UL (ref 0–1)
MONOCYTES # BLD: 1 K/UL (ref 0–1)
MONOCYTES # BLD: 1 K/UL (ref 0–1)
MONOCYTES # BLD: 1.2 K/UL (ref 0–1)
MONOCYTES # BLD: 1.3 K/UL (ref 0–1)
MONOCYTES # BLD: 1.4 K/UL (ref 0–1)
MONOCYTES # BLD: 1.5 K/UL (ref 0–1)
MONOCYTES NFR BLD: 10 % (ref 5–13)
MONOCYTES NFR BLD: 10 % (ref 5–13)
MONOCYTES NFR BLD: 11 % (ref 5–13)
MONOCYTES NFR BLD: 11 % (ref 5–13)
MONOCYTES NFR BLD: 14 % (ref 5–13)
MONOCYTES NFR BLD: 14 % (ref 5–13)
MONOCYTES NFR BLD: 15 % (ref 5–13)
MONOCYTES NFR BLD: 25 % (ref 5–13)
MONOCYTES NFR BLD: 6 % (ref 5–13)
MONOCYTES NFR BLD: 7 % (ref 5–13)
NEUTS BAND NFR BLD MANUAL: 1 %
NEUTS BAND NFR BLD MANUAL: 6 %
NEUTS SEG # BLD: 1.4 K/UL (ref 1.8–8)
NEUTS SEG # BLD: 11 K/UL (ref 1.8–8)
NEUTS SEG # BLD: 13 K/UL (ref 1.8–8)
NEUTS SEG # BLD: 3.9 K/UL (ref 1.8–8)
NEUTS SEG # BLD: 4.9 K/UL (ref 1.8–8)
NEUTS SEG # BLD: 5.1 K/UL (ref 1.8–8)
NEUTS SEG # BLD: 6.3 K/UL (ref 1.8–8)
NEUTS SEG # BLD: 6.3 K/UL (ref 1.8–8)
NEUTS SEG # BLD: 6.4 K/UL (ref 1.8–8)
NEUTS SEG # BLD: 7 K/UL (ref 1.8–8)
NEUTS SEG # BLD: 7.9 K/UL (ref 1.8–8)
NEUTS SEG # BLD: 8.4 K/UL (ref 1.8–8)
NEUTS SEG NFR BLD: 46 % (ref 32–75)
NEUTS SEG NFR BLD: 62 % (ref 32–75)
NEUTS SEG NFR BLD: 65 % (ref 32–75)
NEUTS SEG NFR BLD: 67 % (ref 32–75)
NEUTS SEG NFR BLD: 72 % (ref 32–75)
NEUTS SEG NFR BLD: 73 % (ref 32–75)
NEUTS SEG NFR BLD: 74 % (ref 32–75)
NEUTS SEG NFR BLD: 75 % (ref 32–75)
NEUTS SEG NFR BLD: 80 % (ref 32–75)
NEUTS SEG NFR BLD: 81 % (ref 32–75)
NEUTS SEG NFR BLD: 83 % (ref 32–75)
NEUTS SEG NFR BLD: 89 % (ref 32–75)
NITRITE UR QL STRIP.AUTO: NEGATIVE
NRBC # BLD: 0 K/UL (ref 0–0.01)
NRBC # BLD: 0.03 K/UL (ref 0–0.01)
NRBC # BLD: 0.04 K/UL (ref 0–0.01)
NRBC BLD-RTO: 0 PER 100 WBC
NRBC BLD-RTO: 0.3 PER 100 WBC
NRBC BLD-RTO: 0.5 PER 100 WBC
P-R INTERVAL, ECG05: 126 MS
P-R INTERVAL, ECG05: 148 MS
P-R INTERVAL, ECG05: 158 MS
PH UR STRIP: 5.5 [PH] (ref 5–8)
PH UR STRIP: 5.5 [PH] (ref 5–8)
PH UR STRIP: 6 [PH] (ref 5–8)
PH UR STRIP: 6 [PH] (ref 5–8)
PHOSPHATE SERPL-MCNC: 2.5 MG/DL (ref 2.6–4.7)
PHOSPHATE SERPL-MCNC: 2.8 MG/DL (ref 2.6–4.7)
PLATELET # BLD AUTO: 160 K/UL (ref 150–400)
PLATELET # BLD AUTO: 177 K/UL (ref 150–400)
PLATELET # BLD AUTO: 179 K/UL (ref 150–400)
PLATELET # BLD AUTO: 184 K/UL (ref 150–400)
PLATELET # BLD AUTO: 189 K/UL (ref 150–400)
PLATELET # BLD AUTO: 203 K/UL (ref 150–400)
PLATELET # BLD AUTO: 206 K/UL (ref 150–400)
PLATELET # BLD AUTO: 217 K/UL (ref 150–400)
PLATELET # BLD AUTO: 234 K/UL (ref 150–400)
PLATELET # BLD AUTO: 242 K/UL (ref 150–400)
PLATELET # BLD AUTO: 243 K/UL (ref 150–400)
PLATELET # BLD AUTO: 268 K/UL (ref 150–400)
PLATELET # BLD AUTO: 338 K/UL (ref 150–400)
PLATELET # BLD AUTO: 349 K/UL (ref 150–400)
PLATELET # BLD AUTO: 350 K/UL (ref 150–400)
PLATELET # BLD AUTO: 365 K/UL (ref 150–400)
PLATELET # BLD AUTO: 367 K/UL (ref 150–400)
PLATELET # BLD AUTO: 422 K/UL (ref 150–400)
PLATELET # BLD AUTO: 468 K/UL (ref 150–400)
PMV BLD AUTO: 10 FL (ref 8.9–12.9)
PMV BLD AUTO: 10.1 FL (ref 8.9–12.9)
PMV BLD AUTO: 10.1 FL (ref 8.9–12.9)
PMV BLD AUTO: 10.2 FL (ref 8.9–12.9)
PMV BLD AUTO: 10.2 FL (ref 8.9–12.9)
PMV BLD AUTO: 9.2 FL (ref 8.9–12.9)
PMV BLD AUTO: 9.4 FL (ref 8.9–12.9)
PMV BLD AUTO: 9.4 FL (ref 8.9–12.9)
PMV BLD AUTO: 9.7 FL (ref 8.9–12.9)
PMV BLD AUTO: 9.8 FL (ref 8.9–12.9)
PMV BLD AUTO: 9.9 FL (ref 8.9–12.9)
POTASSIUM SERPL-SCNC: 3.1 MMOL/L (ref 3.5–5.1)
POTASSIUM SERPL-SCNC: 3.1 MMOL/L (ref 3.5–5.1)
POTASSIUM SERPL-SCNC: 3.2 MMOL/L (ref 3.5–5.1)
POTASSIUM SERPL-SCNC: 3.3 MMOL/L (ref 3.5–5.1)
POTASSIUM SERPL-SCNC: 3.5 MMOL/L (ref 3.5–5.1)
POTASSIUM SERPL-SCNC: 3.5 MMOL/L (ref 3.5–5.1)
POTASSIUM SERPL-SCNC: 3.6 MMOL/L (ref 3.5–5.1)
POTASSIUM SERPL-SCNC: 3.7 MMOL/L (ref 3.5–5.1)
POTASSIUM SERPL-SCNC: 3.8 MMOL/L (ref 3.5–5.1)
POTASSIUM SERPL-SCNC: 3.8 MMOL/L (ref 3.5–5.1)
POTASSIUM SERPL-SCNC: 3.9 MMOL/L (ref 3.5–5.1)
PROT SERPL-MCNC: 5.5 G/DL (ref 6.4–8.2)
PROT SERPL-MCNC: 5.6 G/DL (ref 6.4–8.2)
PROT SERPL-MCNC: 5.6 G/DL (ref 6.4–8.2)
PROT SERPL-MCNC: 5.7 G/DL (ref 6.4–8.2)
PROT SERPL-MCNC: 5.7 G/DL (ref 6.4–8.2)
PROT SERPL-MCNC: 5.8 G/DL (ref 6.4–8.2)
PROT SERPL-MCNC: 6 G/DL (ref 6.4–8.2)
PROT SERPL-MCNC: 6.1 G/DL (ref 6.4–8.2)
PROT SERPL-MCNC: 6.3 G/DL (ref 6.4–8.2)
PROT SERPL-MCNC: 7 G/DL (ref 6.4–8.2)
PROT UR STRIP-MCNC: 30 MG/DL
PROT UR STRIP-MCNC: ABNORMAL MG/DL
PROT UR STRIP-MCNC: NEGATIVE MG/DL
PROT UR STRIP-MCNC: NEGATIVE MG/DL
PROTHROMBIN TIME: 11 SEC (ref 9–11.1)
Q-T INTERVAL, ECG07: 408 MS
Q-T INTERVAL, ECG07: 424 MS
Q-T INTERVAL, ECG07: 476 MS
QRS DURATION, ECG06: 120 MS
QRS DURATION, ECG06: 128 MS
QRS DURATION, ECG06: 136 MS
QTC CALCULATION (BEZET), ECG08: 471 MS
QTC CALCULATION (BEZET), ECG08: 473 MS
QTC CALCULATION (BEZET), ECG08: 479 MS
RBC # BLD AUTO: 3.02 M/UL (ref 4.1–5.7)
RBC # BLD AUTO: 3.05 M/UL (ref 4.1–5.7)
RBC # BLD AUTO: 3.11 M/UL (ref 4.1–5.7)
RBC # BLD AUTO: 3.22 M/UL (ref 4.1–5.7)
RBC # BLD AUTO: 3.25 M/UL (ref 4.1–5.7)
RBC # BLD AUTO: 3.26 M/UL (ref 4.1–5.7)
RBC # BLD AUTO: 3.28 M/UL (ref 4.1–5.7)
RBC # BLD AUTO: 3.29 M/UL (ref 4.1–5.7)
RBC # BLD AUTO: 3.36 M/UL (ref 4.1–5.7)
RBC # BLD AUTO: 3.46 M/UL (ref 4.1–5.7)
RBC # BLD AUTO: 3.5 M/UL (ref 4.1–5.7)
RBC # BLD AUTO: 3.51 M/UL (ref 4.1–5.7)
RBC # BLD AUTO: 3.53 M/UL (ref 4.1–5.7)
RBC # BLD AUTO: 3.53 M/UL (ref 4.1–5.7)
RBC # BLD AUTO: 3.54 M/UL (ref 4.1–5.7)
RBC # BLD AUTO: 3.57 M/UL (ref 4.1–5.7)
RBC # BLD AUTO: 3.6 M/UL (ref 4.1–5.7)
RBC # BLD AUTO: 3.8 M/UL (ref 4.1–5.7)
RBC # BLD AUTO: 4.29 M/UL (ref 4.1–5.7)
RBC #/AREA URNS HPF: ABNORMAL /HPF (ref 0–5)
RBC #/AREA URNS HPF: NORMAL /HPF (ref 0–5)
RBC MORPH BLD: ABNORMAL
SEE BELOW:, 164879: NORMAL
SERVICE CMNT-IMP: NORMAL
SERVICE CMNT-IMP: NORMAL
SODIUM SERPL-SCNC: 130 MMOL/L (ref 136–145)
SODIUM SERPL-SCNC: 131 MMOL/L (ref 136–145)
SODIUM SERPL-SCNC: 131 MMOL/L (ref 136–145)
SODIUM SERPL-SCNC: 132 MMOL/L (ref 136–145)
SODIUM SERPL-SCNC: 132 MMOL/L (ref 136–145)
SODIUM SERPL-SCNC: 133 MMOL/L (ref 136–145)
SODIUM SERPL-SCNC: 133 MMOL/L (ref 136–145)
SODIUM SERPL-SCNC: 134 MMOL/L (ref 136–145)
SODIUM SERPL-SCNC: 134 MMOL/L (ref 136–145)
SODIUM SERPL-SCNC: 135 MMOL/L (ref 136–145)
SODIUM SERPL-SCNC: 135 MMOL/L (ref 136–145)
SODIUM SERPL-SCNC: 136 MMOL/L (ref 136–145)
SODIUM SERPL-SCNC: 137 MMOL/L (ref 136–145)
SODIUM SERPL-SCNC: 137 MMOL/L (ref 136–145)
SODIUM SERPL-SCNC: 138 MMOL/L (ref 136–145)
SODIUM SERPL-SCNC: 141 MMOL/L (ref 136–145)
SODIUM SERPL-SCNC: 142 MMOL/L (ref 136–145)
SODIUM SERPL-SCNC: 144 MMOL/L (ref 136–145)
SP GR UR REFRACTOMETRY: 1.01 (ref 1–1.03)
SP GR UR REFRACTOMETRY: 1.02 (ref 1–1.03)
SPECIMEN EXP DATE BLD: NORMAL
TRIGL SERPL-MCNC: 150 MG/DL (ref ?–150)
TROPONIN I SERPL-MCNC: <0.04 NG/ML
TROPONIN I SERPL-MCNC: <0.05 NG/ML
TSH SERPL DL<=0.05 MIU/L-ACNC: 3.59 UIU/ML (ref 0.36–3.74)
UA: UC IF INDICATED,UAUC: ABNORMAL
UA: UC IF INDICATED,UAUC: NORMAL
UROBILINOGEN UR QL STRIP.AUTO: 0.2 EU/DL (ref 0.2–1)
UROBILINOGEN UR QL STRIP.AUTO: 1 EU/DL (ref 0.2–1)
VENTRICULAR RATE, ECG03: 59 BPM
VENTRICULAR RATE, ECG03: 77 BPM
VENTRICULAR RATE, ECG03: 81 BPM
VIT B12 SERPL-MCNC: 1721 PG/ML (ref 193–986)
VLDLC SERPL CALC-MCNC: 30 MG/DL
WBC # BLD AUTO: 10 K/UL (ref 4.1–11.1)
WBC # BLD AUTO: 10.5 K/UL (ref 4.1–11.1)
WBC # BLD AUTO: 10.6 K/UL (ref 4.1–11.1)
WBC # BLD AUTO: 10.9 K/UL (ref 4.1–11.1)
WBC # BLD AUTO: 13.3 K/UL (ref 4.1–11.1)
WBC # BLD AUTO: 13.9 K/UL (ref 4.1–11.1)
WBC # BLD AUTO: 14.5 K/UL (ref 4.1–11.1)
WBC # BLD AUTO: 14.6 K/UL (ref 4.1–11.1)
WBC # BLD AUTO: 2.7 K/UL (ref 4.1–11.1)
WBC # BLD AUTO: 3.5 K/UL (ref 4.1–11.1)
WBC # BLD AUTO: 4.8 K/UL (ref 4.1–11.1)
WBC # BLD AUTO: 6.7 K/UL (ref 4.1–11.1)
WBC # BLD AUTO: 6.8 K/UL (ref 4.1–11.1)
WBC # BLD AUTO: 7.8 K/UL (ref 4.1–11.1)
WBC # BLD AUTO: 8.2 K/UL (ref 4.1–11.1)
WBC # BLD AUTO: 8.2 K/UL (ref 4.1–11.1)
WBC # BLD AUTO: 8.8 K/UL (ref 4.1–11.1)
WBC # BLD AUTO: 9.4 K/UL (ref 4.1–11.1)
WBC # BLD AUTO: 9.7 K/UL (ref 4.1–11.1)
WBC URNS QL MICRO: ABNORMAL /HPF (ref 0–4)
WBC URNS QL MICRO: NORMAL /HPF (ref 0–4)

## 2018-01-01 PROCEDURE — 85027 COMPLETE CBC AUTOMATED: CPT | Performed by: PHYSICAL MEDICINE & REHABILITATION

## 2018-01-01 PROCEDURE — 85610 PROTHROMBIN TIME: CPT | Performed by: INTERNAL MEDICINE

## 2018-01-01 PROCEDURE — 36415 COLL VENOUS BLD VENIPUNCTURE: CPT | Performed by: INTERNAL MEDICINE

## 2018-01-01 PROCEDURE — 80048 BASIC METABOLIC PNL TOTAL CA: CPT | Performed by: INTERNAL MEDICINE

## 2018-01-01 PROCEDURE — 74011000250 HC RX REV CODE- 250: Performed by: FAMILY MEDICINE

## 2018-01-01 PROCEDURE — 80053 COMPREHEN METABOLIC PANEL: CPT | Performed by: EMERGENCY MEDICINE

## 2018-01-01 PROCEDURE — 74177 CT ABD & PELVIS W/CONTRAST: CPT

## 2018-01-01 PROCEDURE — 74011250636 HC RX REV CODE- 250/636: Performed by: FAMILY MEDICINE

## 2018-01-01 PROCEDURE — 74011250637 HC RX REV CODE- 250/637: Performed by: INTERNAL MEDICINE

## 2018-01-01 PROCEDURE — 74011250636 HC RX REV CODE- 250/636: Performed by: INTERNAL MEDICINE

## 2018-01-01 PROCEDURE — 77030012965 HC NDL HUBR BBMI -A

## 2018-01-01 PROCEDURE — G0152 HHCP-SERV OF OT,EA 15 MIN: HCPCS

## 2018-01-01 PROCEDURE — 74011250636 HC RX REV CODE- 250/636

## 2018-01-01 PROCEDURE — 77030010541

## 2018-01-01 PROCEDURE — C1788 PORT, INDWELLING, IMP: HCPCS

## 2018-01-01 PROCEDURE — 80048 BASIC METABOLIC PNL TOTAL CA: CPT | Performed by: SURGERY

## 2018-01-01 PROCEDURE — 77010033678 HC OXYGEN DAILY

## 2018-01-01 PROCEDURE — 3331090002 HH PPS REVENUE DEBIT

## 2018-01-01 PROCEDURE — 97530 THERAPEUTIC ACTIVITIES: CPT | Performed by: OCCUPATIONAL THERAPIST

## 2018-01-01 PROCEDURE — 85025 COMPLETE CBC W/AUTO DIFF WBC: CPT | Performed by: EMERGENCY MEDICINE

## 2018-01-01 PROCEDURE — 80061 LIPID PANEL: CPT | Performed by: INTERNAL MEDICINE

## 2018-01-01 PROCEDURE — 3331090001 HH PPS REVENUE CREDIT

## 2018-01-01 PROCEDURE — 74011000258 HC RX REV CODE- 258: Performed by: FAMILY MEDICINE

## 2018-01-01 PROCEDURE — 86301 IMMUNOASSAY TUMOR CA 19-9: CPT | Performed by: INTERNAL MEDICINE

## 2018-01-01 PROCEDURE — 77030002986 HC SUT PROL J&J -A: Performed by: FAMILY MEDICINE

## 2018-01-01 PROCEDURE — 77030011641 HC PASTE OST ADH BMS -A

## 2018-01-01 PROCEDURE — 77030032490 HC SLV COMPR SCD KNE COVD -B

## 2018-01-01 PROCEDURE — 65660000000 HC RM CCU STEPDOWN

## 2018-01-01 PROCEDURE — 74011250637 HC RX REV CODE- 250/637: Performed by: PHYSICAL MEDICINE & REHABILITATION

## 2018-01-01 PROCEDURE — G0299 HHS/HOSPICE OF RN EA 15 MIN: HCPCS

## 2018-01-01 PROCEDURE — 99285 EMERGENCY DEPT VISIT HI MDM: CPT

## 2018-01-01 PROCEDURE — C1892 INTRO/SHEATH,FIXED,PEEL-AWAY: HCPCS

## 2018-01-01 PROCEDURE — 96374 THER/PROPH/DIAG INJ IV PUSH: CPT

## 2018-01-01 PROCEDURE — 77030011640 HC PAD GRND REM COVD -A: Performed by: FAMILY MEDICINE

## 2018-01-01 PROCEDURE — 97165 OT EVAL LOW COMPLEX 30 MIN: CPT

## 2018-01-01 PROCEDURE — 74011250636 HC RX REV CODE- 250/636: Performed by: EMERGENCY MEDICINE

## 2018-01-01 PROCEDURE — 85027 COMPLETE CBC AUTOMATED: CPT | Performed by: INTERNAL MEDICINE

## 2018-01-01 PROCEDURE — 97116 GAIT TRAINING THERAPY: CPT

## 2018-01-01 PROCEDURE — 92522 EVALUATE SPEECH PRODUCTION: CPT | Performed by: SPEECH-LANGUAGE PATHOLOGIST

## 2018-01-01 PROCEDURE — 76450000000

## 2018-01-01 PROCEDURE — G0300 HHS/HOSPICE OF LPN EA 15 MIN: HCPCS

## 2018-01-01 PROCEDURE — 65270000029 HC RM PRIVATE

## 2018-01-01 PROCEDURE — 80048 BASIC METABOLIC PNL TOTAL CA: CPT | Performed by: FAMILY MEDICINE

## 2018-01-01 PROCEDURE — 96361 HYDRATE IV INFUSION ADD-ON: CPT

## 2018-01-01 PROCEDURE — 88341 IMHCHEM/IMCYTCHM EA ADD ANTB: CPT | Performed by: FAMILY MEDICINE

## 2018-01-01 PROCEDURE — 07BJ0ZX EXCISION OF LEFT INGUINAL LYMPHATIC, OPEN APPROACH, DIAGNOSTIC: ICD-10-PCS | Performed by: FAMILY MEDICINE

## 2018-01-01 PROCEDURE — 74019 RADEX ABDOMEN 2 VIEWS: CPT

## 2018-01-01 PROCEDURE — 77030008768 HC TU NG VYGC -A

## 2018-01-01 PROCEDURE — C9113 INJ PANTOPRAZOLE SODIUM, VIA: HCPCS | Performed by: FAMILY MEDICINE

## 2018-01-01 PROCEDURE — 74011636320 HC RX REV CODE- 636/320: Performed by: FAMILY MEDICINE

## 2018-01-01 PROCEDURE — 71045 X-RAY EXAM CHEST 1 VIEW: CPT

## 2018-01-01 PROCEDURE — 74011000250 HC RX REV CODE- 250: Performed by: INTERNAL MEDICINE

## 2018-01-01 PROCEDURE — 95816 EEG AWAKE AND DROWSY: CPT | Performed by: PSYCHIATRY & NEUROLOGY

## 2018-01-01 PROCEDURE — C1781 MESH (IMPLANTABLE): HCPCS | Performed by: FAMILY MEDICINE

## 2018-01-01 PROCEDURE — 77030025240 HC RELD STPL GIA 2 COVD -C: Performed by: FAMILY MEDICINE

## 2018-01-01 PROCEDURE — 96417 CHEMO IV INFUS EACH ADDL SEQ: CPT

## 2018-01-01 PROCEDURE — 77030008771 HC TU NG SALEM SUMP -A

## 2018-01-01 PROCEDURE — 74011000250 HC RX REV CODE- 250

## 2018-01-01 PROCEDURE — 96413 CHEMO IV INFUSION 1 HR: CPT

## 2018-01-01 PROCEDURE — 80053 COMPREHEN METABOLIC PANEL: CPT | Performed by: FAMILY MEDICINE

## 2018-01-01 PROCEDURE — 51798 US URINE CAPACITY MEASURE: CPT

## 2018-01-01 PROCEDURE — 97530 THERAPEUTIC ACTIVITIES: CPT

## 2018-01-01 PROCEDURE — 74011636320 HC RX REV CODE- 636/320: Performed by: SURGERY

## 2018-01-01 PROCEDURE — 77030008771 HC TU NG SALEM SUMP -A: Performed by: NURSE ANESTHETIST, CERTIFIED REGISTERED

## 2018-01-01 PROCEDURE — 0YU60JZ SUPPLEMENT LEFT INGUINAL REGION WITH SYNTHETIC SUBSTITUTE, OPEN APPROACH: ICD-10-PCS | Performed by: FAMILY MEDICINE

## 2018-01-01 PROCEDURE — 86038 ANTINUCLEAR ANTIBODIES: CPT | Performed by: PSYCHIATRY & NEUROLOGY

## 2018-01-01 PROCEDURE — 87086 URINE CULTURE/COLONY COUNT: CPT | Performed by: PHYSICAL MEDICINE & REHABILITATION

## 2018-01-01 PROCEDURE — 74011250636 HC RX REV CODE- 250/636: Performed by: NURSE PRACTITIONER

## 2018-01-01 PROCEDURE — 77030019563 HC DEV ATTCH FEED HOLL -A

## 2018-01-01 PROCEDURE — 86900 BLOOD TYPING SEROLOGIC ABO: CPT | Performed by: EMERGENCY MEDICINE

## 2018-01-01 PROCEDURE — 85027 COMPLETE CBC AUTOMATED: CPT | Performed by: FAMILY MEDICINE

## 2018-01-01 PROCEDURE — 84100 ASSAY OF PHOSPHORUS: CPT | Performed by: INTERNAL MEDICINE

## 2018-01-01 PROCEDURE — 84443 ASSAY THYROID STIM HORMONE: CPT | Performed by: PSYCHIATRY & NEUROLOGY

## 2018-01-01 PROCEDURE — G8978 MOBILITY CURRENT STATUS: HCPCS

## 2018-01-01 PROCEDURE — 85025 COMPLETE CBC W/AUTO DIFF WBC: CPT | Performed by: FAMILY MEDICINE

## 2018-01-01 PROCEDURE — 74011250636 HC RX REV CODE- 250/636: Performed by: RADIOLOGY

## 2018-01-01 PROCEDURE — 36415 COLL VENOUS BLD VENIPUNCTURE: CPT | Performed by: NURSE PRACTITIONER

## 2018-01-01 PROCEDURE — 74011250637 HC RX REV CODE- 250/637: Performed by: NURSE PRACTITIONER

## 2018-01-01 PROCEDURE — 97535 SELF CARE MNGMENT TRAINING: CPT | Performed by: OCCUPATIONAL THERAPIST

## 2018-01-01 PROCEDURE — 85025 COMPLETE CBC W/AUTO DIFF WBC: CPT | Performed by: INTERNAL MEDICINE

## 2018-01-01 PROCEDURE — A9575 INJ GADOTERATE MEGLUMI 0.1ML: HCPCS | Performed by: INTERNAL MEDICINE

## 2018-01-01 PROCEDURE — 85652 RBC SED RATE AUTOMATED: CPT | Performed by: PSYCHIATRY & NEUROLOGY

## 2018-01-01 PROCEDURE — 81001 URINALYSIS AUTO W/SCOPE: CPT | Performed by: INTERNAL MEDICINE

## 2018-01-01 PROCEDURE — 83605 ASSAY OF LACTIC ACID: CPT | Performed by: EMERGENCY MEDICINE

## 2018-01-01 PROCEDURE — 83690 ASSAY OF LIPASE: CPT | Performed by: EMERGENCY MEDICINE

## 2018-01-01 PROCEDURE — 3331090003 HH PPS REVENUE ADJ

## 2018-01-01 PROCEDURE — 74011636320 HC RX REV CODE- 636/320: Performed by: INTERNAL MEDICINE

## 2018-01-01 PROCEDURE — 36415 COLL VENOUS BLD VENIPUNCTURE: CPT | Performed by: EMERGENCY MEDICINE

## 2018-01-01 PROCEDURE — G8979 MOBILITY GOAL STATUS: HCPCS

## 2018-01-01 PROCEDURE — 74018 RADEX ABDOMEN 1 VIEW: CPT

## 2018-01-01 PROCEDURE — 36415 COLL VENOUS BLD VENIPUNCTURE: CPT | Performed by: PHYSICAL MEDICINE & REHABILITATION

## 2018-01-01 PROCEDURE — 70553 MRI BRAIN STEM W/O & W/DYE: CPT

## 2018-01-01 PROCEDURE — 83735 ASSAY OF MAGNESIUM: CPT | Performed by: INTERNAL MEDICINE

## 2018-01-01 PROCEDURE — 77030010520

## 2018-01-01 PROCEDURE — 36415 COLL VENOUS BLD VENIPUNCTURE: CPT | Performed by: FAMILY MEDICINE

## 2018-01-01 PROCEDURE — 96375 TX/PRO/DX INJ NEW DRUG ADDON: CPT

## 2018-01-01 PROCEDURE — 85025 COMPLETE CBC W/AUTO DIFF WBC: CPT | Performed by: PHYSICAL MEDICINE & REHABILITATION

## 2018-01-01 PROCEDURE — G8987 SELF CARE CURRENT STATUS: HCPCS | Performed by: OCCUPATIONAL THERAPIST

## 2018-01-01 PROCEDURE — 77030016057 HC NDL HUBR APOL -B

## 2018-01-01 PROCEDURE — 81001 URINALYSIS AUTO W/SCOPE: CPT | Performed by: EMERGENCY MEDICINE

## 2018-01-01 PROCEDURE — 85025 COMPLETE CBC W/AUTO DIFF WBC: CPT | Performed by: SURGERY

## 2018-01-01 PROCEDURE — 93306 TTE W/DOPPLER COMPLETE: CPT

## 2018-01-01 PROCEDURE — 74011000258 HC RX REV CODE- 258: Performed by: INTERNAL MEDICINE

## 2018-01-01 PROCEDURE — 80048 BASIC METABOLIC PNL TOTAL CA: CPT | Performed by: NURSE PRACTITIONER

## 2018-01-01 PROCEDURE — 0DBN0ZZ EXCISION OF SIGMOID COLON, OPEN APPROACH: ICD-10-PCS | Performed by: FAMILY MEDICINE

## 2018-01-01 PROCEDURE — 0VBG0ZX EXCISION OF LEFT SPERMATIC CORD, OPEN APPROACH, DIAGNOSTIC: ICD-10-PCS | Performed by: FAMILY MEDICINE

## 2018-01-01 PROCEDURE — 95816 EEG AWAKE AND DROWSY: CPT | Performed by: INTERNAL MEDICINE

## 2018-01-01 PROCEDURE — 83036 HEMOGLOBIN GLYCOSYLATED A1C: CPT | Performed by: INTERNAL MEDICINE

## 2018-01-01 PROCEDURE — 77030003028 HC SUT VCRL J&J -A: Performed by: FAMILY MEDICINE

## 2018-01-01 PROCEDURE — 80053 COMPREHEN METABOLIC PANEL: CPT | Performed by: PHYSICAL MEDICINE & REHABILITATION

## 2018-01-01 PROCEDURE — 80076 HEPATIC FUNCTION PANEL: CPT | Performed by: INTERNAL MEDICINE

## 2018-01-01 PROCEDURE — 80053 COMPREHEN METABOLIC PANEL: CPT | Performed by: INTERNAL MEDICINE

## 2018-01-01 PROCEDURE — 80048 BASIC METABOLIC PNL TOTAL CA: CPT | Performed by: PHYSICAL MEDICINE & REHABILITATION

## 2018-01-01 PROCEDURE — 74011000250 HC RX REV CODE- 250: Performed by: HOSPITALIST

## 2018-01-01 PROCEDURE — 74011636320 HC RX REV CODE- 636/320: Performed by: EMERGENCY MEDICINE

## 2018-01-01 PROCEDURE — 83735 ASSAY OF MAGNESIUM: CPT | Performed by: NURSE PRACTITIONER

## 2018-01-01 PROCEDURE — 74245 XR UPPER GI/SMALL BOWEL: CPT

## 2018-01-01 PROCEDURE — 0D9670Z DRAINAGE OF STOMACH WITH DRAINAGE DEVICE, VIA NATURAL OR ARTIFICIAL OPENING: ICD-10-PCS | Performed by: FAMILY MEDICINE

## 2018-01-01 PROCEDURE — 74011000250 HC RX REV CODE- 250: Performed by: RADIOLOGY

## 2018-01-01 PROCEDURE — 83735 ASSAY OF MAGNESIUM: CPT | Performed by: EMERGENCY MEDICINE

## 2018-01-01 PROCEDURE — 74011250636 HC RX REV CODE- 250/636: Performed by: HOSPITALIST

## 2018-01-01 PROCEDURE — G0151 HHCP-SERV OF PT,EA 15 MIN: HCPCS

## 2018-01-01 PROCEDURE — 96365 THER/PROPH/DIAG IV INF INIT: CPT

## 2018-01-01 PROCEDURE — 76010000132 HC OR TIME 2.5 TO 3 HR: Performed by: FAMILY MEDICINE

## 2018-01-01 PROCEDURE — 74011250636 HC RX REV CODE- 250/636: Performed by: GENERAL ACUTE CARE HOSPITAL

## 2018-01-01 PROCEDURE — 84484 ASSAY OF TROPONIN QUANT: CPT | Performed by: EMERGENCY MEDICINE

## 2018-01-01 PROCEDURE — 81001 URINALYSIS AUTO W/SCOPE: CPT | Performed by: PHYSICAL MEDICINE & REHABILITATION

## 2018-01-01 PROCEDURE — 70544 MR ANGIOGRAPHY HEAD W/O DYE: CPT

## 2018-01-01 PROCEDURE — 97535 SELF CARE MNGMENT TRAINING: CPT

## 2018-01-01 PROCEDURE — 70450 CT HEAD/BRAIN W/O DYE: CPT

## 2018-01-01 PROCEDURE — 400013 HH SOC

## 2018-01-01 PROCEDURE — 82550 ASSAY OF CK (CPK): CPT | Performed by: EMERGENCY MEDICINE

## 2018-01-01 PROCEDURE — 76937 US GUIDE VASCULAR ACCESS: CPT

## 2018-01-01 PROCEDURE — 77030031139 HC SUT VCRL2 J&J -A

## 2018-01-01 PROCEDURE — 93005 ELECTROCARDIOGRAM TRACING: CPT

## 2018-01-01 PROCEDURE — 97161 PT EVAL LOW COMPLEX 20 MIN: CPT

## 2018-01-01 PROCEDURE — G8988 SELF CARE GOAL STATUS: HCPCS | Performed by: OCCUPATIONAL THERAPIST

## 2018-01-01 PROCEDURE — 77030032490 HC SLV COMPR SCD KNE COVD -B: Performed by: FAMILY MEDICINE

## 2018-01-01 PROCEDURE — 97110 THERAPEUTIC EXERCISES: CPT

## 2018-01-01 PROCEDURE — 71260 CT THORAX DX C+: CPT

## 2018-01-01 PROCEDURE — 77030002996 HC SUT SLK J&J -A: Performed by: FAMILY MEDICINE

## 2018-01-01 PROCEDURE — 77030010541: Performed by: FAMILY MEDICINE

## 2018-01-01 PROCEDURE — 77030034850: Performed by: FAMILY MEDICINE

## 2018-01-01 PROCEDURE — 76060000036 HC ANESTHESIA 2.5 TO 3 HR: Performed by: FAMILY MEDICINE

## 2018-01-01 PROCEDURE — 88307 TISSUE EXAM BY PATHOLOGIST: CPT | Performed by: FAMILY MEDICINE

## 2018-01-01 PROCEDURE — 88305 TISSUE EXAM BY PATHOLOGIST: CPT | Performed by: FAMILY MEDICINE

## 2018-01-01 PROCEDURE — 77030018836 HC SOL IRR NACL ICUM -A: Performed by: FAMILY MEDICINE

## 2018-01-01 PROCEDURE — 74011000250 HC RX REV CODE- 250: Performed by: EMERGENCY MEDICINE

## 2018-01-01 PROCEDURE — 97165 OT EVAL LOW COMPLEX 30 MIN: CPT | Performed by: OCCUPATIONAL THERAPIST

## 2018-01-01 PROCEDURE — 77030014366 HC DRN WND BNTM -A: Performed by: FAMILY MEDICINE

## 2018-01-01 PROCEDURE — 36415 COLL VENOUS BLD VENIPUNCTURE: CPT | Performed by: SURGERY

## 2018-01-01 PROCEDURE — 77030018846 HC SOL IRR STRL H20 ICUM -A: Performed by: FAMILY MEDICINE

## 2018-01-01 PROCEDURE — 97162 PT EVAL MOD COMPLEX 30 MIN: CPT

## 2018-01-01 PROCEDURE — 93880 EXTRACRANIAL BILAT STUDY: CPT

## 2018-01-01 PROCEDURE — 77030002966 HC SUT PDS J&J -A: Performed by: FAMILY MEDICINE

## 2018-01-01 PROCEDURE — 77030002933 HC SUT MCRYL J&J -A: Performed by: FAMILY MEDICINE

## 2018-01-01 PROCEDURE — 77030008684 HC TU ET CUF COVD -B: Performed by: NURSE ANESTHETIST, CERTIFIED REGISTERED

## 2018-01-01 PROCEDURE — 83605 ASSAY OF LACTIC ACID: CPT | Performed by: FAMILY MEDICINE

## 2018-01-01 PROCEDURE — 82607 VITAMIN B-12: CPT | Performed by: PSYCHIATRY & NEUROLOGY

## 2018-01-01 PROCEDURE — 85025 COMPLETE CBC W/AUTO DIFF WBC: CPT | Performed by: NURSE PRACTITIONER

## 2018-01-01 PROCEDURE — 77030008771 HC TU NG SALEM SUMP -A: Performed by: FAMILY MEDICINE

## 2018-01-01 PROCEDURE — 96367 TX/PROPH/DG ADDL SEQ IV INF: CPT

## 2018-01-01 PROCEDURE — 83090 ASSAY OF HOMOCYSTEINE: CPT | Performed by: PSYCHIATRY & NEUROLOGY

## 2018-01-01 PROCEDURE — 77030031139 HC SUT VCRL2 J&J -A: Performed by: FAMILY MEDICINE

## 2018-01-01 PROCEDURE — 83690 ASSAY OF LIPASE: CPT | Performed by: INTERNAL MEDICINE

## 2018-01-01 PROCEDURE — 0D1N0Z4 BYPASS SIGMOID COLON TO CUTANEOUS, OPEN APPROACH: ICD-10-PCS | Performed by: FAMILY MEDICINE

## 2018-01-01 PROCEDURE — 77030034818 HC STPLR INT GIA COVD -C: Performed by: FAMILY MEDICINE

## 2018-01-01 PROCEDURE — 88342 IMHCHEM/IMCYTCHM 1ST ANTB: CPT | Performed by: FAMILY MEDICINE

## 2018-01-01 PROCEDURE — 82306 VITAMIN D 25 HYDROXY: CPT | Performed by: PSYCHIATRY & NEUROLOGY

## 2018-01-01 PROCEDURE — 76210000006 HC OR PH I REC 0.5 TO 1 HR: Performed by: FAMILY MEDICINE

## 2018-01-01 PROCEDURE — 99284 EMERGENCY DEPT VISIT MOD MDM: CPT

## 2018-01-01 PROCEDURE — 77030039266 HC ADH SKN EXOFIN S2SG -A

## 2018-01-01 PROCEDURE — 77030018846 HC SOL IRR STRL H20 ICUM -A

## 2018-01-01 DEVICE — PLUG HERN L W1.6XL1.9IN INGUINAL POLYPR REP PRESHAPED ONLAY: Type: IMPLANTABLE DEVICE | Site: INGUINAL | Status: FUNCTIONAL

## 2018-01-01 RX ORDER — BUPIVACAINE HYDROCHLORIDE AND EPINEPHRINE 2.5; 5 MG/ML; UG/ML
INJECTION, SOLUTION EPIDURAL; INFILTRATION; INTRACAUDAL; PERINEURAL AS NEEDED
Status: DISCONTINUED | OUTPATIENT
Start: 2018-01-01 | End: 2018-01-01 | Stop reason: HOSPADM

## 2018-01-01 RX ORDER — HYDROCHLOROTHIAZIDE 25 MG/1
25 TABLET ORAL DAILY
Status: DISCONTINUED | OUTPATIENT
Start: 2018-01-01 | End: 2018-01-01 | Stop reason: ALTCHOICE

## 2018-01-01 RX ORDER — CLONAZEPAM 0.5 MG/1
0.5 TABLET ORAL
COMMUNITY
End: 2018-01-01

## 2018-01-01 RX ORDER — METOCLOPRAMIDE 5 MG/1
5 TABLET ORAL
COMMUNITY

## 2018-01-01 RX ORDER — SODIUM CHLORIDE 0.9 % (FLUSH) 0.9 %
10 SYRINGE (ML) INJECTION
Status: COMPLETED | OUTPATIENT
Start: 2018-01-01 | End: 2018-01-01

## 2018-01-01 RX ORDER — HEPARIN 100 UNIT/ML
500 SYRINGE INTRAVENOUS AS NEEDED
Status: ACTIVE | OUTPATIENT
Start: 2018-01-01 | End: 2018-01-01

## 2018-01-01 RX ORDER — METOPROLOL TARTRATE 5 MG/5ML
2.5 INJECTION INTRAVENOUS EVERY 6 HOURS
Status: DISCONTINUED | OUTPATIENT
Start: 2018-01-01 | End: 2018-01-01

## 2018-01-01 RX ORDER — SODIUM CHLORIDE 0.9 % (FLUSH) 0.9 %
5-10 SYRINGE (ML) INJECTION AS NEEDED
Status: DISCONTINUED | OUTPATIENT
Start: 2018-01-01 | End: 2018-01-01 | Stop reason: HOSPADM

## 2018-01-01 RX ORDER — TAMSULOSIN HYDROCHLORIDE 0.4 MG/1
0.4 CAPSULE ORAL DAILY
Status: DISCONTINUED | OUTPATIENT
Start: 2018-01-01 | End: 2018-01-01 | Stop reason: HOSPADM

## 2018-01-01 RX ORDER — ROCURONIUM BROMIDE 10 MG/ML
INJECTION, SOLUTION INTRAVENOUS AS NEEDED
Status: DISCONTINUED | OUTPATIENT
Start: 2018-01-01 | End: 2018-01-01 | Stop reason: HOSPADM

## 2018-01-01 RX ORDER — MELATONIN
2000 DAILY
Status: DISCONTINUED | OUTPATIENT
Start: 2018-01-01 | End: 2018-01-01 | Stop reason: HOSPADM

## 2018-01-01 RX ORDER — SODIUM CHLORIDE, SODIUM LACTATE, POTASSIUM CHLORIDE, CALCIUM CHLORIDE 600; 310; 30; 20 MG/100ML; MG/100ML; MG/100ML; MG/100ML
50 INJECTION, SOLUTION INTRAVENOUS CONTINUOUS
Status: DISCONTINUED | OUTPATIENT
Start: 2018-01-01 | End: 2018-01-01 | Stop reason: HOSPADM

## 2018-01-01 RX ORDER — ACETAMINOPHEN 650 MG/1
650 SUPPOSITORY RECTAL
Status: DISCONTINUED | OUTPATIENT
Start: 2018-01-01 | End: 2018-01-01 | Stop reason: HOSPADM

## 2018-01-01 RX ORDER — AMLODIPINE BESYLATE 5 MG/1
5 TABLET ORAL DAILY
Status: DISCONTINUED | OUTPATIENT
Start: 2018-01-01 | End: 2018-01-01

## 2018-01-01 RX ORDER — ONDANSETRON 2 MG/ML
4 INJECTION INTRAMUSCULAR; INTRAVENOUS
Status: DISCONTINUED | OUTPATIENT
Start: 2018-01-01 | End: 2018-01-01 | Stop reason: HOSPADM

## 2018-01-01 RX ORDER — GLYCOPYRROLATE 0.2 MG/ML
INJECTION INTRAMUSCULAR; INTRAVENOUS AS NEEDED
Status: DISCONTINUED | OUTPATIENT
Start: 2018-01-01 | End: 2018-01-01 | Stop reason: HOSPADM

## 2018-01-01 RX ORDER — NEOSTIGMINE METHYLSULFATE 1 MG/ML
INJECTION INTRAVENOUS AS NEEDED
Status: DISCONTINUED | OUTPATIENT
Start: 2018-01-01 | End: 2018-01-01 | Stop reason: HOSPADM

## 2018-01-01 RX ORDER — TAMSULOSIN HYDROCHLORIDE 0.4 MG/1
0.4 CAPSULE ORAL DAILY
Qty: 30 CAP | Refills: 1 | Status: SHIPPED | OUTPATIENT
Start: 2018-01-01 | End: 2018-01-01 | Stop reason: CLARIF

## 2018-01-01 RX ORDER — LOPERAMIDE HCL 2 MG
2 TABLET ORAL
COMMUNITY
End: 2018-01-01

## 2018-01-01 RX ORDER — SODIUM CHLORIDE 9 MG/ML
25 INJECTION, SOLUTION INTRAVENOUS CONTINUOUS
Status: DISPENSED | OUTPATIENT
Start: 2018-01-01 | End: 2018-01-01

## 2018-01-01 RX ORDER — AMLODIPINE BESYLATE 5 MG/1
10 TABLET ORAL DAILY
Status: DISCONTINUED | OUTPATIENT
Start: 2018-01-01 | End: 2018-01-01 | Stop reason: HOSPADM

## 2018-01-01 RX ORDER — ACETAMINOPHEN 325 MG/1
650 TABLET ORAL
Status: DISCONTINUED | OUTPATIENT
Start: 2018-01-01 | End: 2018-01-01 | Stop reason: HOSPADM

## 2018-01-01 RX ORDER — FENTANYL CITRATE 50 UG/ML
25 INJECTION, SOLUTION INTRAMUSCULAR; INTRAVENOUS
Status: DISCONTINUED | OUTPATIENT
Start: 2018-01-01 | End: 2018-01-01 | Stop reason: HOSPADM

## 2018-01-01 RX ORDER — CALCIUM CARBONATE 200(500)MG
200 TABLET,CHEWABLE ORAL
Status: DISCONTINUED | OUTPATIENT
Start: 2018-01-01 | End: 2018-01-01 | Stop reason: HOSPADM

## 2018-01-01 RX ORDER — OXYCODONE HYDROCHLORIDE 5 MG/1
2.5 TABLET ORAL
Status: DISCONTINUED | OUTPATIENT
Start: 2018-01-01 | End: 2018-01-01 | Stop reason: HOSPADM

## 2018-01-01 RX ORDER — POTASSIUM CHLORIDE 1.5 G/1.77G
20 POWDER, FOR SOLUTION ORAL
Status: COMPLETED | OUTPATIENT
Start: 2018-01-01 | End: 2018-01-01

## 2018-01-01 RX ORDER — LIDOCAINE HYDROCHLORIDE 20 MG/ML
INJECTION, SOLUTION EPIDURAL; INFILTRATION; INTRACAUDAL; PERINEURAL AS NEEDED
Status: DISCONTINUED | OUTPATIENT
Start: 2018-01-01 | End: 2018-01-01 | Stop reason: HOSPADM

## 2018-01-01 RX ORDER — PHENAZOPYRIDINE HYDROCHLORIDE 100 MG/1
200 TABLET, FILM COATED ORAL
Status: DISCONTINUED | OUTPATIENT
Start: 2018-01-01 | End: 2018-01-01 | Stop reason: HOSPADM

## 2018-01-01 RX ORDER — SODIUM CHLORIDE 0.9 % (FLUSH) 0.9 %
5-10 SYRINGE (ML) INJECTION EVERY 8 HOURS
Status: DISCONTINUED | OUTPATIENT
Start: 2018-01-01 | End: 2018-01-01 | Stop reason: HOSPADM

## 2018-01-01 RX ORDER — ENOXAPARIN SODIUM 100 MG/ML
40 INJECTION SUBCUTANEOUS DAILY
Status: DISCONTINUED | OUTPATIENT
Start: 2018-01-01 | End: 2018-01-01 | Stop reason: HOSPADM

## 2018-01-01 RX ORDER — POTASSIUM CHLORIDE 7.45 MG/ML
10 INJECTION INTRAVENOUS ONCE
Status: COMPLETED | OUTPATIENT
Start: 2018-01-01 | End: 2018-01-01

## 2018-01-01 RX ORDER — LORAZEPAM 2 MG/ML
0.5 INJECTION INTRAMUSCULAR
Status: COMPLETED | OUTPATIENT
Start: 2018-01-01 | End: 2018-01-01

## 2018-01-01 RX ORDER — PROCHLORPERAZINE EDISYLATE 5 MG/ML
10 INJECTION INTRAMUSCULAR; INTRAVENOUS
Status: ACTIVE | OUTPATIENT
Start: 2018-01-01 | End: 2018-01-01

## 2018-01-01 RX ORDER — FENTANYL CITRATE 50 UG/ML
INJECTION, SOLUTION INTRAMUSCULAR; INTRAVENOUS AS NEEDED
Status: DISCONTINUED | OUTPATIENT
Start: 2018-01-01 | End: 2018-01-01 | Stop reason: HOSPADM

## 2018-01-01 RX ORDER — OXYCODONE HYDROCHLORIDE 5 MG/1
5 TABLET ORAL
Status: DISCONTINUED | OUTPATIENT
Start: 2018-01-01 | End: 2018-01-01 | Stop reason: HOSPADM

## 2018-01-01 RX ORDER — SODIUM CHLORIDE 9 MG/ML
25 INJECTION, SOLUTION INTRAVENOUS CONTINUOUS
Status: DISCONTINUED | OUTPATIENT
Start: 2018-01-01 | End: 2018-01-01 | Stop reason: HOSPADM

## 2018-01-01 RX ORDER — AMLODIPINE BESYLATE 10 MG/1
10 TABLET ORAL DAILY
Status: ON HOLD | COMMUNITY
End: 2018-01-01

## 2018-01-01 RX ORDER — AMLODIPINE BESYLATE 10 MG/1
10 TABLET ORAL DAILY
Qty: 30 TAB | Refills: 1 | Status: SHIPPED | OUTPATIENT
Start: 2018-01-01 | End: 2018-01-01

## 2018-01-01 RX ORDER — PROPOFOL 10 MG/ML
INJECTION, EMULSION INTRAVENOUS AS NEEDED
Status: DISCONTINUED | OUTPATIENT
Start: 2018-01-01 | End: 2018-01-01 | Stop reason: HOSPADM

## 2018-01-01 RX ORDER — METOCLOPRAMIDE 10 MG/1
5 TABLET ORAL
Status: DISCONTINUED | OUTPATIENT
Start: 2018-01-01 | End: 2018-01-01 | Stop reason: HOSPADM

## 2018-01-01 RX ORDER — SODIUM CHLORIDE 9 MG/ML
50 INJECTION, SOLUTION INTRAVENOUS CONTINUOUS
Status: DISPENSED | OUTPATIENT
Start: 2018-01-01 | End: 2018-01-01

## 2018-01-01 RX ORDER — NYSTATIN 100000 [USP'U]/ML
500000 SUSPENSION ORAL 4 TIMES DAILY
Status: DISCONTINUED | OUTPATIENT
Start: 2018-01-01 | End: 2018-01-01 | Stop reason: ALTCHOICE

## 2018-01-01 RX ORDER — MIDAZOLAM HYDROCHLORIDE 1 MG/ML
5 INJECTION, SOLUTION INTRAMUSCULAR; INTRAVENOUS
Status: DISCONTINUED | OUTPATIENT
Start: 2018-01-01 | End: 2018-01-01 | Stop reason: HOSPADM

## 2018-01-01 RX ORDER — HYDROMORPHONE HYDROCHLORIDE 1 MG/ML
0.5 INJECTION, SOLUTION INTRAMUSCULAR; INTRAVENOUS; SUBCUTANEOUS
Status: DISCONTINUED | OUTPATIENT
Start: 2018-01-01 | End: 2018-01-01 | Stop reason: HOSPADM

## 2018-01-01 RX ORDER — SODIUM CHLORIDE, SODIUM LACTATE, POTASSIUM CHLORIDE, CALCIUM CHLORIDE 600; 310; 30; 20 MG/100ML; MG/100ML; MG/100ML; MG/100ML
25 INJECTION, SOLUTION INTRAVENOUS CONTINUOUS
Status: DISCONTINUED | OUTPATIENT
Start: 2018-01-01 | End: 2018-01-01 | Stop reason: HOSPADM

## 2018-01-01 RX ORDER — METOCLOPRAMIDE 10 MG/1
5 TABLET ORAL
Status: DISCONTINUED | OUTPATIENT
Start: 2018-01-01 | End: 2018-01-01

## 2018-01-01 RX ORDER — ONDANSETRON 4 MG/1
4 TABLET, ORALLY DISINTEGRATING ORAL
Status: DISCONTINUED | OUTPATIENT
Start: 2018-01-01 | End: 2018-01-01 | Stop reason: HOSPADM

## 2018-01-01 RX ORDER — FENTANYL CITRATE 50 UG/ML
100 INJECTION, SOLUTION INTRAMUSCULAR; INTRAVENOUS
Status: DISCONTINUED | OUTPATIENT
Start: 2018-01-01 | End: 2018-01-01 | Stop reason: HOSPADM

## 2018-01-01 RX ORDER — ONDANSETRON 2 MG/ML
4 INJECTION INTRAMUSCULAR; INTRAVENOUS
Status: COMPLETED | OUTPATIENT
Start: 2018-01-01 | End: 2018-01-01

## 2018-01-01 RX ORDER — PROCHLORPERAZINE EDISYLATE 5 MG/ML
10 INJECTION INTRAMUSCULAR; INTRAVENOUS
Status: DISCONTINUED | OUTPATIENT
Start: 2018-01-01 | End: 2018-01-01

## 2018-01-01 RX ORDER — SODIUM CHLORIDE 0.9 % (FLUSH) 0.9 %
5-10 SYRINGE (ML) INJECTION EVERY 8 HOURS
Status: DISCONTINUED | OUTPATIENT
Start: 2018-01-01 | End: 2018-01-01

## 2018-01-01 RX ORDER — ONDANSETRON 4 MG/1
4 TABLET, FILM COATED ORAL
COMMUNITY

## 2018-01-01 RX ORDER — PRAVASTATIN SODIUM 40 MG/1
40 TABLET ORAL
Status: DISCONTINUED | OUTPATIENT
Start: 2018-01-01 | End: 2018-01-01 | Stop reason: HOSPADM

## 2018-01-01 RX ORDER — MORPHINE SULFATE 10 MG/ML
2 INJECTION, SOLUTION INTRAMUSCULAR; INTRAVENOUS
Status: COMPLETED | OUTPATIENT
Start: 2018-01-01 | End: 2018-01-01

## 2018-01-01 RX ORDER — FAMOTIDINE 20 MG/1
20 TABLET, FILM COATED ORAL DAILY PRN
Status: DISCONTINUED | OUTPATIENT
Start: 2018-01-01 | End: 2018-01-01 | Stop reason: SDUPTHER

## 2018-01-01 RX ORDER — AMLODIPINE BESYLATE 5 MG/1
10 TABLET ORAL DAILY
Status: DISCONTINUED | OUTPATIENT
Start: 2018-01-01 | End: 2018-01-01

## 2018-01-01 RX ORDER — DEXAMETHASONE SODIUM PHOSPHATE 4 MG/ML
8 INJECTION, SOLUTION INTRA-ARTICULAR; INTRALESIONAL; INTRAMUSCULAR; INTRAVENOUS; SOFT TISSUE ONCE
Status: DISCONTINUED | OUTPATIENT
Start: 2018-01-01 | End: 2018-01-01

## 2018-01-01 RX ORDER — PHENOL/SODIUM PHENOLATE
20 AEROSOL, SPRAY (ML) MUCOUS MEMBRANE DAILY
COMMUNITY
End: 2018-01-01

## 2018-01-01 RX ORDER — POTASSIUM CHLORIDE 20 MEQ/1
40 TABLET, EXTENDED RELEASE ORAL
Status: COMPLETED | OUTPATIENT
Start: 2018-01-01 | End: 2018-01-01

## 2018-01-01 RX ORDER — LIDOCAINE HYDROCHLORIDE 20 MG/ML
18 INJECTION, SOLUTION INFILTRATION; PERINEURAL ONCE
Status: COMPLETED | OUTPATIENT
Start: 2018-01-01 | End: 2018-01-01

## 2018-01-01 RX ORDER — MORPHINE SULFATE 4 MG/ML
2 INJECTION INTRAVENOUS
Status: DISCONTINUED | OUTPATIENT
Start: 2018-01-01 | End: 2018-01-01 | Stop reason: HOSPADM

## 2018-01-01 RX ORDER — METOCLOPRAMIDE HYDROCHLORIDE 5 MG/ML
5 INJECTION INTRAMUSCULAR; INTRAVENOUS
Status: DISCONTINUED | OUTPATIENT
Start: 2018-01-01 | End: 2018-01-01 | Stop reason: HOSPADM

## 2018-01-01 RX ORDER — DIPHENHYDRAMINE HYDROCHLORIDE 50 MG/ML
12.5 INJECTION, SOLUTION INTRAMUSCULAR; INTRAVENOUS
Status: DISCONTINUED | OUTPATIENT
Start: 2018-01-01 | End: 2018-01-01 | Stop reason: HOSPADM

## 2018-01-01 RX ORDER — MAGNESIUM SULFATE HEPTAHYDRATE 40 MG/ML
2 INJECTION, SOLUTION INTRAVENOUS ONCE
Status: COMPLETED | OUTPATIENT
Start: 2018-01-01 | End: 2018-01-01

## 2018-01-01 RX ORDER — HYDRALAZINE HYDROCHLORIDE 20 MG/ML
10 INJECTION INTRAMUSCULAR; INTRAVENOUS
Status: DISCONTINUED | OUTPATIENT
Start: 2018-01-01 | End: 2018-01-01 | Stop reason: HOSPADM

## 2018-01-01 RX ORDER — ACETAMINOPHEN 10 MG/ML
INJECTION, SOLUTION INTRAVENOUS AS NEEDED
Status: DISCONTINUED | OUTPATIENT
Start: 2018-01-01 | End: 2018-01-01 | Stop reason: HOSPADM

## 2018-01-01 RX ORDER — SODIUM CHLORIDE 9 MG/ML
100 INJECTION, SOLUTION INTRAVENOUS CONTINUOUS
Status: DISCONTINUED | OUTPATIENT
Start: 2018-01-01 | End: 2018-01-01

## 2018-01-01 RX ORDER — EPHEDRINE SULFATE 50 MG/ML
INJECTION, SOLUTION INTRAVENOUS AS NEEDED
Status: DISCONTINUED | OUTPATIENT
Start: 2018-01-01 | End: 2018-01-01 | Stop reason: HOSPADM

## 2018-01-01 RX ORDER — SODIUM CHLORIDE 9 MG/ML
25 INJECTION, SOLUTION INTRAVENOUS CONTINUOUS
Status: DISCONTINUED | OUTPATIENT
Start: 2018-01-01 | End: 2018-01-01

## 2018-01-01 RX ORDER — POTASSIUM CHLORIDE 750 MG/1
10 TABLET, FILM COATED, EXTENDED RELEASE ORAL DAILY
Status: DISCONTINUED | OUTPATIENT
Start: 2018-01-01 | End: 2018-01-01

## 2018-01-01 RX ORDER — DEXAMETHASONE SODIUM PHOSPHATE 4 MG/ML
8 INJECTION, SOLUTION INTRA-ARTICULAR; INTRALESIONAL; INTRAMUSCULAR; INTRAVENOUS; SOFT TISSUE ONCE
Status: COMPLETED | OUTPATIENT
Start: 2018-01-01 | End: 2018-01-01

## 2018-01-01 RX ORDER — POTASSIUM CHLORIDE 750 MG/1
20 TABLET, FILM COATED, EXTENDED RELEASE ORAL
Status: COMPLETED | OUTPATIENT
Start: 2018-01-01 | End: 2018-01-01

## 2018-01-01 RX ORDER — POTASSIUM CHLORIDE AND SODIUM CHLORIDE 900; 300 MG/100ML; MG/100ML
INJECTION, SOLUTION INTRAVENOUS CONTINUOUS
Status: DISCONTINUED | OUTPATIENT
Start: 2018-01-01 | End: 2018-01-01 | Stop reason: HOSPADM

## 2018-01-01 RX ORDER — FAMOTIDINE 20 MG/1
20 TABLET, FILM COATED ORAL 2 TIMES DAILY
Status: DISCONTINUED | OUTPATIENT
Start: 2018-01-01 | End: 2018-01-01 | Stop reason: HOSPADM

## 2018-01-01 RX ORDER — ADHESIVE BANDAGE
30 BANDAGE TOPICAL DAILY PRN
Status: DISCONTINUED | OUTPATIENT
Start: 2018-01-01 | End: 2018-01-01 | Stop reason: HOSPADM

## 2018-01-01 RX ORDER — SUCCINYLCHOLINE CHLORIDE 20 MG/ML
INJECTION INTRAMUSCULAR; INTRAVENOUS AS NEEDED
Status: DISCONTINUED | OUTPATIENT
Start: 2018-01-01 | End: 2018-01-01 | Stop reason: HOSPADM

## 2018-01-01 RX ORDER — HYDROCHLOROTHIAZIDE 25 MG/1
25 TABLET ORAL DAILY
Status: DISCONTINUED | OUTPATIENT
Start: 2018-01-01 | End: 2018-01-01

## 2018-01-01 RX ORDER — GADOTERATE MEGLUMINE 376.9 MG/ML
17 INJECTION INTRAVENOUS
Status: COMPLETED | OUTPATIENT
Start: 2018-01-01 | End: 2018-01-01

## 2018-01-01 RX ORDER — POTASSIUM CHLORIDE 750 MG/1
40 TABLET, FILM COATED, EXTENDED RELEASE ORAL ONCE
Status: COMPLETED | OUTPATIENT
Start: 2018-01-01 | End: 2018-01-01

## 2018-01-01 RX ORDER — LIDOCAINE HYDROCHLORIDE 10 MG/ML
0.1 INJECTION, SOLUTION EPIDURAL; INFILTRATION; INTRACAUDAL; PERINEURAL AS NEEDED
Status: DISCONTINUED | OUTPATIENT
Start: 2018-01-01 | End: 2018-01-01 | Stop reason: HOSPADM

## 2018-01-01 RX ORDER — FLUCONAZOLE 200 MG/1
200 TABLET ORAL DAILY
Status: DISCONTINUED | OUTPATIENT
Start: 2018-01-01 | End: 2018-01-01 | Stop reason: HOSPADM

## 2018-01-01 RX ORDER — PANTOPRAZOLE SODIUM 40 MG/1
40 TABLET, DELAYED RELEASE ORAL
Status: DISCONTINUED | OUTPATIENT
Start: 2018-01-01 | End: 2018-01-01

## 2018-01-01 RX ORDER — LANOLIN ALCOHOL/MO/W.PET/CERES
3 CREAM (GRAM) TOPICAL
Status: DISCONTINUED | OUTPATIENT
Start: 2018-01-01 | End: 2018-01-01 | Stop reason: HOSPADM

## 2018-01-01 RX ORDER — LORAZEPAM 2 MG/ML
1 INJECTION INTRAMUSCULAR ONCE
Status: COMPLETED | OUTPATIENT
Start: 2018-01-01 | End: 2018-01-01

## 2018-01-01 RX ORDER — ASPIRIN 325 MG/1
100 TABLET, FILM COATED ORAL DAILY
Status: DISCONTINUED | OUTPATIENT
Start: 2018-01-01 | End: 2018-01-01 | Stop reason: HOSPADM

## 2018-01-01 RX ORDER — FAMOTIDINE 10 MG/ML
20 INJECTION INTRAVENOUS EVERY 12 HOURS
Status: DISCONTINUED | OUTPATIENT
Start: 2018-01-01 | End: 2018-01-01

## 2018-01-01 RX ORDER — SODIUM CHLORIDE, SODIUM LACTATE, POTASSIUM CHLORIDE, CALCIUM CHLORIDE 600; 310; 30; 20 MG/100ML; MG/100ML; MG/100ML; MG/100ML
INJECTION, SOLUTION INTRAVENOUS
Status: DISCONTINUED | OUTPATIENT
Start: 2018-01-01 | End: 2018-01-01 | Stop reason: HOSPADM

## 2018-01-01 RX ORDER — MORPHINE SULFATE 10 MG/ML
2 INJECTION, SOLUTION INTRAMUSCULAR; INTRAVENOUS
Status: DISCONTINUED | OUTPATIENT
Start: 2018-01-01 | End: 2018-01-01 | Stop reason: HOSPADM

## 2018-01-01 RX ORDER — LABETALOL 200 MG/1
200 TABLET, FILM COATED ORAL 2 TIMES DAILY
Qty: 60 TAB | Refills: 1 | Status: SHIPPED | OUTPATIENT
Start: 2018-01-01 | End: 2018-01-01

## 2018-01-01 RX ORDER — ACETAMINOPHEN 500 MG
500 TABLET ORAL
COMMUNITY
End: 2018-01-01

## 2018-01-01 RX ORDER — SODIUM CHLORIDE 9 MG/ML
50 INJECTION, SOLUTION INTRAVENOUS
Status: COMPLETED | OUTPATIENT
Start: 2018-01-01 | End: 2018-01-01

## 2018-01-01 RX ORDER — FAMOTIDINE 10 MG/ML
20 INJECTION INTRAVENOUS DAILY
Status: DISCONTINUED | OUTPATIENT
Start: 2018-01-01 | End: 2018-01-01 | Stop reason: HOSPADM

## 2018-01-01 RX ORDER — SODIUM CHLORIDE 9 MG/ML
1000 INJECTION, SOLUTION INTRAVENOUS ONCE
Status: COMPLETED | OUTPATIENT
Start: 2018-01-01 | End: 2018-01-01

## 2018-01-01 RX ORDER — ONDANSETRON 2 MG/ML
4 INJECTION INTRAMUSCULAR; INTRAVENOUS AS NEEDED
Status: DISCONTINUED | OUTPATIENT
Start: 2018-01-01 | End: 2018-01-01 | Stop reason: HOSPADM

## 2018-01-01 RX ORDER — LANOLIN ALCOHOL/MO/W.PET/CERES
100 CREAM (GRAM) TOPICAL DAILY
Status: DISCONTINUED | OUTPATIENT
Start: 2018-01-01 | End: 2018-01-01 | Stop reason: SDUPTHER

## 2018-01-01 RX ORDER — DEXTROSE, SODIUM CHLORIDE, AND POTASSIUM CHLORIDE 5; .9; .15 G/100ML; G/100ML; G/100ML
100 INJECTION INTRAVENOUS CONTINUOUS
Status: DISPENSED | OUTPATIENT
Start: 2018-01-01 | End: 2018-01-01

## 2018-01-01 RX ORDER — SODIUM CHLORIDE 9 MG/ML
50 INJECTION, SOLUTION INTRAVENOUS CONTINUOUS
Status: DISCONTINUED | OUTPATIENT
Start: 2018-01-01 | End: 2018-01-01

## 2018-01-01 RX ORDER — ONDANSETRON 4 MG/1
4 TABLET, ORALLY DISINTEGRATING ORAL
Qty: 30 TAB | Refills: 3 | Status: SHIPPED | OUTPATIENT
Start: 2018-01-01 | End: 2018-01-01

## 2018-01-01 RX ORDER — METOPROLOL TARTRATE 5 MG/5ML
INJECTION INTRAVENOUS AS NEEDED
Status: DISCONTINUED | OUTPATIENT
Start: 2018-01-01 | End: 2018-01-01 | Stop reason: HOSPADM

## 2018-01-01 RX ORDER — CEFAZOLIN SODIUM/WATER 2 G/20 ML
2 SYRINGE (ML) INTRAVENOUS ONCE
Status: COMPLETED | OUTPATIENT
Start: 2018-01-01 | End: 2018-01-01

## 2018-01-01 RX ORDER — LABETALOL 100 MG/1
200 TABLET, FILM COATED ORAL 2 TIMES DAILY
Status: DISCONTINUED | OUTPATIENT
Start: 2018-01-01 | End: 2018-01-01 | Stop reason: HOSPADM

## 2018-01-01 RX ORDER — HEPARIN 100 UNIT/ML
300 SYRINGE INTRAVENOUS ONCE
Status: COMPLETED | OUTPATIENT
Start: 2018-01-01 | End: 2018-01-01

## 2018-01-01 RX ORDER — LORAZEPAM 2 MG/ML
0.26 INJECTION INTRAMUSCULAR
Status: DISCONTINUED | OUTPATIENT
Start: 2018-01-01 | End: 2018-01-01 | Stop reason: HOSPADM

## 2018-01-01 RX ORDER — ONDANSETRON 2 MG/ML
INJECTION INTRAMUSCULAR; INTRAVENOUS AS NEEDED
Status: DISCONTINUED | OUTPATIENT
Start: 2018-01-01 | End: 2018-01-01 | Stop reason: HOSPADM

## 2018-01-01 RX ORDER — HYDROCHLOROTHIAZIDE 25 MG/1
25 TABLET ORAL DAILY
Qty: 30 TAB | Refills: 1 | Status: SHIPPED | OUTPATIENT
Start: 2018-01-01 | End: 2018-01-01

## 2018-01-01 RX ORDER — HEPARIN 100 UNIT/ML
300 SYRINGE INTRAVENOUS AS NEEDED
Status: DISCONTINUED | OUTPATIENT
Start: 2018-01-01 | End: 2018-01-01 | Stop reason: HOSPADM

## 2018-01-01 RX ORDER — PRAVASTATIN SODIUM 40 MG/1
40 TABLET ORAL
Status: DISCONTINUED | OUTPATIENT
Start: 2018-01-01 | End: 2018-01-01

## 2018-01-01 RX ORDER — PHENYLEPHRINE HCL IN 0.9% NACL 0.4MG/10ML
SYRINGE (ML) INTRAVENOUS AS NEEDED
Status: DISCONTINUED | OUTPATIENT
Start: 2018-01-01 | End: 2018-01-01 | Stop reason: HOSPADM

## 2018-01-01 RX ORDER — LABETALOL 100 MG/1
100 TABLET, FILM COATED ORAL 2 TIMES DAILY
Status: DISCONTINUED | OUTPATIENT
Start: 2018-01-01 | End: 2018-01-01

## 2018-01-01 RX ORDER — HYDROCHLOROTHIAZIDE 25 MG/1
25 TABLET ORAL DAILY
Status: DISCONTINUED | OUTPATIENT
Start: 2018-01-01 | End: 2018-01-01 | Stop reason: HOSPADM

## 2018-01-01 RX ORDER — PROCHLORPERAZINE MALEATE 10 MG
10 TABLET ORAL
Qty: 30 TAB | Refills: 3 | Status: SHIPPED | OUTPATIENT
Start: 2018-01-01 | End: 2018-01-01

## 2018-01-01 RX ORDER — POTASSIUM CHLORIDE 750 MG/1
20 TABLET, FILM COATED, EXTENDED RELEASE ORAL DAILY
Status: DISCONTINUED | OUTPATIENT
Start: 2018-01-01 | End: 2018-01-01 | Stop reason: HOSPADM

## 2018-01-01 RX ORDER — LIDOCAINE AND PRILOCAINE 25; 25 MG/G; MG/G
CREAM TOPICAL AS NEEDED
Qty: 30 G | Refills: 0 | Status: SHIPPED | OUTPATIENT
Start: 2018-01-01 | End: 2018-01-01

## 2018-01-01 RX ORDER — CALCIUM CARBONATE 200(500)MG
1 TABLET,CHEWABLE ORAL
COMMUNITY
End: 2018-01-01

## 2018-01-01 RX ORDER — SODIUM CHLORIDE 0.9 % (FLUSH) 0.9 %
10-40 SYRINGE (ML) INJECTION AS NEEDED
Status: ACTIVE | OUTPATIENT
Start: 2018-01-01 | End: 2018-01-01

## 2018-01-01 RX ORDER — LIDOCAINE HYDROCHLORIDE AND EPINEPHRINE 10; 10 MG/ML; UG/ML
1.5 INJECTION, SOLUTION INFILTRATION; PERINEURAL ONCE
Status: COMPLETED | OUTPATIENT
Start: 2018-01-01 | End: 2018-01-01

## 2018-01-01 RX ADMIN — TAMSULOSIN HYDROCHLORIDE 0.4 MG: 0.4 CAPSULE ORAL at 08:55

## 2018-01-01 RX ADMIN — FAMOTIDINE 20 MG: 20 TABLET ORAL at 22:01

## 2018-01-01 RX ADMIN — LABETALOL HYDROCHLORIDE 200 MG: 100 TABLET, FILM COATED ORAL at 21:23

## 2018-01-01 RX ADMIN — TAMSULOSIN HYDROCHLORIDE 0.4 MG: 0.4 CAPSULE ORAL at 08:47

## 2018-01-01 RX ADMIN — METOCLOPRAMIDE HYDROCHLORIDE 5 MG: 10 TABLET ORAL at 08:28

## 2018-01-01 RX ADMIN — PIPERACILLIN SODIUM,TAZOBACTAM SODIUM 3.38 G: 3; .375 INJECTION, POWDER, FOR SOLUTION INTRAVENOUS at 04:07

## 2018-01-01 RX ADMIN — PANTOPRAZOLE SODIUM 40 MG: 40 TABLET, DELAYED RELEASE ORAL at 10:06

## 2018-01-01 RX ADMIN — ONDANSETRON 4 MG: 2 INJECTION INTRAMUSCULAR; INTRAVENOUS at 16:14

## 2018-01-01 RX ADMIN — ONDANSETRON 4 MG: 2 INJECTION INTRAMUSCULAR; INTRAVENOUS at 02:59

## 2018-01-01 RX ADMIN — MORPHINE SULFATE 2 MG: 4 INJECTION INTRAVENOUS at 13:49

## 2018-01-01 RX ADMIN — FAMOTIDINE 20 MG: 20 TABLET ORAL at 08:22

## 2018-01-01 RX ADMIN — AMLODIPINE BESYLATE 10 MG: 5 TABLET ORAL at 08:00

## 2018-01-01 RX ADMIN — Medication 10 ML: at 21:58

## 2018-01-01 RX ADMIN — VITAMIN D, TAB 1000IU (100/BT) 2000 UNITS: 25 TAB at 09:03

## 2018-01-01 RX ADMIN — Medication 10 ML: at 06:00

## 2018-01-01 RX ADMIN — SODIUM CHLORIDE 50 ML/HR: 900 INJECTION, SOLUTION INTRAVENOUS at 10:27

## 2018-01-01 RX ADMIN — METOCLOPRAMIDE HYDROCHLORIDE 5 MG: 10 TABLET ORAL at 17:25

## 2018-01-01 RX ADMIN — Medication 100 MG: at 08:47

## 2018-01-01 RX ADMIN — Medication 100 MG: at 08:15

## 2018-01-01 RX ADMIN — LABETALOL HYDROCHLORIDE 200 MG: 100 TABLET, FILM COATED ORAL at 08:55

## 2018-01-01 RX ADMIN — MAGNESIUM SULFATE HEPTAHYDRATE 2 G: 40 INJECTION, SOLUTION INTRAVENOUS at 03:33

## 2018-01-01 RX ADMIN — SODIUM CHLORIDE 50 ML/HR: 900 INJECTION, SOLUTION INTRAVENOUS at 08:06

## 2018-01-01 RX ADMIN — AMLODIPINE BESYLATE 10 MG: 5 TABLET ORAL at 08:37

## 2018-01-01 RX ADMIN — FAMOTIDINE 20 MG: 20 TABLET ORAL at 21:33

## 2018-01-01 RX ADMIN — BENZOCAINE AND MENTHOL 1 LOZENGE: 15; 3.6 LOZENGE ORAL at 10:19

## 2018-01-01 RX ADMIN — ENOXAPARIN SODIUM 40 MG: 40 INJECTION SUBCUTANEOUS at 10:19

## 2018-01-01 RX ADMIN — POTASSIUM CHLORIDE 10 MEQ: 10 INJECTION, SOLUTION INTRAVENOUS at 14:49

## 2018-01-01 RX ADMIN — METOCLOPRAMIDE HYDROCHLORIDE 5 MG: 10 TABLET ORAL at 11:09

## 2018-01-01 RX ADMIN — LABETALOL HYDROCHLORIDE 200 MG: 100 TABLET, FILM COATED ORAL at 08:34

## 2018-01-01 RX ADMIN — METOCLOPRAMIDE HYDROCHLORIDE 5 MG: 10 TABLET ORAL at 16:48

## 2018-01-01 RX ADMIN — FAMOTIDINE 20 MG: 20 TABLET ORAL at 20:40

## 2018-01-01 RX ADMIN — SODIUM CHLORIDE AND POTASSIUM CHLORIDE: 9; 2.98 INJECTION, SOLUTION INTRAVENOUS at 01:13

## 2018-01-01 RX ADMIN — ALUMINUM HYDROXIDE AND MAGNESIUM HYDROXIDE 30 ML: 200; 200 SUSPENSION ORAL at 21:04

## 2018-01-01 RX ADMIN — SODIUM CHLORIDE AND POTASSIUM CHLORIDE: 9; 2.98 INJECTION, SOLUTION INTRAVENOUS at 00:04

## 2018-01-01 RX ADMIN — Medication 10 ML: at 05:49

## 2018-01-01 RX ADMIN — Medication 100 MG: at 08:00

## 2018-01-01 RX ADMIN — FAMOTIDINE 20 MG: 20 TABLET ORAL at 08:47

## 2018-01-01 RX ADMIN — SODIUM CHLORIDE 50 ML/HR: 900 INJECTION, SOLUTION INTRAVENOUS at 16:40

## 2018-01-01 RX ADMIN — TAMSULOSIN HYDROCHLORIDE 0.4 MG: 0.4 CAPSULE ORAL at 09:18

## 2018-01-01 RX ADMIN — AMLODIPINE BESYLATE 10 MG: 5 TABLET ORAL at 08:22

## 2018-01-01 RX ADMIN — PIPERACILLIN SODIUM,TAZOBACTAM SODIUM 3.38 G: 3; .375 INJECTION, POWDER, FOR SOLUTION INTRAVENOUS at 20:58

## 2018-01-01 RX ADMIN — FLUCONAZOLE 200 MG: 200 TABLET ORAL at 08:49

## 2018-01-01 RX ADMIN — Medication 10 ML: at 05:15

## 2018-01-01 RX ADMIN — EPHEDRINE SULFATE 10 MG: 50 INJECTION, SOLUTION INTRAVENOUS at 17:48

## 2018-01-01 RX ADMIN — FAMOTIDINE 20 MG: 20 TABLET ORAL at 08:33

## 2018-01-01 RX ADMIN — PROPOFOL 20 MG: 10 INJECTION, EMULSION INTRAVENOUS at 17:38

## 2018-01-01 RX ADMIN — IOPAMIDOL 100 ML: 755 INJECTION, SOLUTION INTRAVENOUS at 10:27

## 2018-01-01 RX ADMIN — FAMOTIDINE 20 MG: 20 TABLET ORAL at 08:15

## 2018-01-01 RX ADMIN — SODIUM CHLORIDE 100 ML/HR: 900 INJECTION, SOLUTION INTRAVENOUS at 13:38

## 2018-01-01 RX ADMIN — Medication 10 ML: at 06:06

## 2018-01-01 RX ADMIN — LORAZEPAM 0.26 MG: 2 INJECTION INTRAMUSCULAR; INTRAVENOUS at 08:35

## 2018-01-01 RX ADMIN — METOCLOPRAMIDE HYDROCHLORIDE 5 MG: 10 TABLET ORAL at 18:02

## 2018-01-01 RX ADMIN — SODIUM CHLORIDE, PRESERVATIVE FREE 300 UNITS: 5 INJECTION INTRAVENOUS at 15:10

## 2018-01-01 RX ADMIN — LABETALOL HYDROCHLORIDE 200 MG: 100 TABLET, FILM COATED ORAL at 17:45

## 2018-01-01 RX ADMIN — MORPHINE SULFATE 2 MG: 4 INJECTION INTRAVENOUS at 19:08

## 2018-01-01 RX ADMIN — NYSTATIN 500000 UNITS: 100000 SUSPENSION ORAL at 18:04

## 2018-01-01 RX ADMIN — LABETALOL HYDROCHLORIDE 200 MG: 100 TABLET, FILM COATED ORAL at 09:18

## 2018-01-01 RX ADMIN — METOCLOPRAMIDE HYDROCHLORIDE 5 MG: 10 TABLET ORAL at 08:16

## 2018-01-01 RX ADMIN — SODIUM CHLORIDE 40 MG: 9 INJECTION INTRAMUSCULAR; INTRAVENOUS; SUBCUTANEOUS at 08:35

## 2018-01-01 RX ADMIN — LIDOCAINE HYDROCHLORIDE AND EPINEPHRINE 10 ML: 10; 10 INJECTION, SOLUTION INFILTRATION; PERINEURAL at 13:23

## 2018-01-01 RX ADMIN — Medication 10 ML: at 06:26

## 2018-01-01 RX ADMIN — SODIUM CHLORIDE 40 MG: 9 INJECTION INTRAMUSCULAR; INTRAVENOUS; SUBCUTANEOUS at 08:06

## 2018-01-01 RX ADMIN — PIPERACILLIN SODIUM,TAZOBACTAM SODIUM 3.38 G: 3; .375 INJECTION, POWDER, FOR SOLUTION INTRAVENOUS at 11:53

## 2018-01-01 RX ADMIN — POTASSIUM CHLORIDE 20 MEQ: 750 TABLET, EXTENDED RELEASE ORAL at 08:28

## 2018-01-01 RX ADMIN — LABETALOL HYDROCHLORIDE 200 MG: 100 TABLET, FILM COATED ORAL at 21:33

## 2018-01-01 RX ADMIN — FAMOTIDINE 20 MG: 20 TABLET ORAL at 20:01

## 2018-01-01 RX ADMIN — AMLODIPINE BESYLATE 10 MG: 5 TABLET ORAL at 08:55

## 2018-01-01 RX ADMIN — SODIUM CHLORIDE AND POTASSIUM CHLORIDE: 9; 2.98 INJECTION, SOLUTION INTRAVENOUS at 19:30

## 2018-01-01 RX ADMIN — Medication 10 ML: at 19:10

## 2018-01-01 RX ADMIN — IOPAMIDOL 100 ML: 755 INJECTION, SOLUTION INTRAVENOUS at 08:06

## 2018-01-01 RX ADMIN — BENZOCAINE AND MENTHOL 1 LOZENGE: 15; 3.6 LOZENGE ORAL at 20:53

## 2018-01-01 RX ADMIN — NYSTATIN 500000 UNITS: 100000 SUSPENSION ORAL at 09:18

## 2018-01-01 RX ADMIN — Medication 10 ML: at 13:12

## 2018-01-01 RX ADMIN — LABETALOL HYDROCHLORIDE 200 MG: 100 TABLET, FILM COATED ORAL at 22:01

## 2018-01-01 RX ADMIN — MORPHINE SULFATE 2 MG: 4 INJECTION INTRAVENOUS at 02:59

## 2018-01-01 RX ADMIN — FAMOTIDINE 20 MG: 20 TABLET ORAL at 21:10

## 2018-01-01 RX ADMIN — SODIUM CHLORIDE 40 MG: 9 INJECTION INTRAMUSCULAR; INTRAVENOUS; SUBCUTANEOUS at 08:45

## 2018-01-01 RX ADMIN — ENOXAPARIN SODIUM 40 MG: 40 INJECTION SUBCUTANEOUS at 09:40

## 2018-01-01 RX ADMIN — ONDANSETRON 4 MG: 2 INJECTION INTRAMUSCULAR; INTRAVENOUS at 10:36

## 2018-01-01 RX ADMIN — HYDROCHLOROTHIAZIDE 25 MG: 25 TABLET ORAL at 17:45

## 2018-01-01 RX ADMIN — SODIUM CHLORIDE 25 ML/HR: 900 INJECTION, SOLUTION INTRAVENOUS at 12:30

## 2018-01-01 RX ADMIN — Medication 10 ML: at 21:05

## 2018-01-01 RX ADMIN — PIPERACILLIN SODIUM,TAZOBACTAM SODIUM 3.38 G: 3; .375 INJECTION, POWDER, FOR SOLUTION INTRAVENOUS at 05:30

## 2018-01-01 RX ADMIN — METOCLOPRAMIDE 5 MG: 5 INJECTION, SOLUTION INTRAMUSCULAR; INTRAVENOUS at 22:35

## 2018-01-01 RX ADMIN — POTASSIUM CHLORIDE 20 MEQ: 750 TABLET, EXTENDED RELEASE ORAL at 09:03

## 2018-01-01 RX ADMIN — Medication 10 ML: at 21:12

## 2018-01-01 RX ADMIN — METOPROLOL TARTRATE 2.5 MG: 5 INJECTION, SOLUTION INTRAVENOUS at 00:58

## 2018-01-01 RX ADMIN — BENZOCAINE AND MENTHOL 1 LOZENGE: 15; 3.6 LOZENGE ORAL at 15:23

## 2018-01-01 RX ADMIN — HYDROCHLOROTHIAZIDE 25 MG: 25 TABLET ORAL at 09:18

## 2018-01-01 RX ADMIN — POTASSIUM CHLORIDE 20 MEQ: 750 TABLET, EXTENDED RELEASE ORAL at 08:33

## 2018-01-01 RX ADMIN — ENOXAPARIN SODIUM 40 MG: 40 INJECTION SUBCUTANEOUS at 08:08

## 2018-01-01 RX ADMIN — TAMSULOSIN HYDROCHLORIDE 0.4 MG: 0.4 CAPSULE ORAL at 10:08

## 2018-01-01 RX ADMIN — Medication 10 ML: at 16:15

## 2018-01-01 RX ADMIN — TAMSULOSIN HYDROCHLORIDE 0.4 MG: 0.4 CAPSULE ORAL at 08:01

## 2018-01-01 RX ADMIN — TAMSULOSIN HYDROCHLORIDE 0.4 MG: 0.4 CAPSULE ORAL at 08:36

## 2018-01-01 RX ADMIN — MORPHINE SULFATE 2 MG: 4 INJECTION INTRAVENOUS at 07:29

## 2018-01-01 RX ADMIN — DEXAMETHASONE SODIUM PHOSPHATE 8 MG: 4 INJECTION, SOLUTION INTRAMUSCULAR; INTRAVENOUS at 12:33

## 2018-01-01 RX ADMIN — ROCURONIUM BROMIDE 5 MG: 10 INJECTION, SOLUTION INTRAVENOUS at 16:59

## 2018-01-01 RX ADMIN — SODIUM CHLORIDE, SODIUM LACTATE, POTASSIUM CHLORIDE, AND CALCIUM CHLORIDE 50 ML/HR: 600; 310; 30; 20 INJECTION, SOLUTION INTRAVENOUS at 17:27

## 2018-01-01 RX ADMIN — ONDANSETRON 4 MG: 2 INJECTION, SOLUTION INTRAMUSCULAR; INTRAVENOUS at 16:58

## 2018-01-01 RX ADMIN — ENOXAPARIN SODIUM 40 MG: 40 INJECTION SUBCUTANEOUS at 09:33

## 2018-01-01 RX ADMIN — FENTANYL CITRATE 25 MCG: 50 INJECTION, SOLUTION INTRAMUSCULAR; INTRAVENOUS at 13:00

## 2018-01-01 RX ADMIN — Medication 100 MG: at 08:22

## 2018-01-01 RX ADMIN — AMLODIPINE BESYLATE 10 MG: 5 TABLET ORAL at 08:08

## 2018-01-01 RX ADMIN — LORAZEPAM 1 MG: 2 INJECTION INTRAMUSCULAR; INTRAVENOUS at 02:12

## 2018-01-01 RX ADMIN — SODIUM CHLORIDE 50 ML/HR: 900 INJECTION, SOLUTION INTRAVENOUS at 16:56

## 2018-01-01 RX ADMIN — Medication 100 MG: at 08:29

## 2018-01-01 RX ADMIN — POTASSIUM CHLORIDE 20 MEQ: 750 TABLET, FILM COATED, EXTENDED RELEASE ORAL at 08:46

## 2018-01-01 RX ADMIN — SODIUM CHLORIDE 500 ML: 900 INJECTION, SOLUTION INTRAVENOUS at 13:40

## 2018-01-01 RX ADMIN — PIPERACILLIN SODIUM,TAZOBACTAM SODIUM 3.38 G: 3; .375 INJECTION, POWDER, FOR SOLUTION INTRAVENOUS at 10:08

## 2018-01-01 RX ADMIN — PIPERACILLIN SODIUM,TAZOBACTAM SODIUM 3.38 G: 3; .375 INJECTION, POWDER, FOR SOLUTION INTRAVENOUS at 03:15

## 2018-01-01 RX ADMIN — POTASSIUM CHLORIDE 10 MEQ: 750 TABLET, FILM COATED, EXTENDED RELEASE ORAL at 08:55

## 2018-01-01 RX ADMIN — FAMOTIDINE 20 MG: 20 TABLET ORAL at 08:29

## 2018-01-01 RX ADMIN — PIPERACILLIN SODIUM,TAZOBACTAM SODIUM 3.38 G: 3; .375 INJECTION, POWDER, FOR SOLUTION INTRAVENOUS at 21:01

## 2018-01-01 RX ADMIN — FAMOTIDINE 20 MG: 20 TABLET ORAL at 20:59

## 2018-01-01 RX ADMIN — Medication 10 ML: at 16:40

## 2018-01-01 RX ADMIN — LABETALOL HYDROCHLORIDE 200 MG: 100 TABLET, FILM COATED ORAL at 20:41

## 2018-01-01 RX ADMIN — ALUMINUM HYDROXIDE AND MAGNESIUM HYDROXIDE 30 ML: 200; 200 SUSPENSION ORAL at 14:42

## 2018-01-01 RX ADMIN — Medication 80 MCG: at 17:05

## 2018-01-01 RX ADMIN — NYSTATIN 500000 UNITS: 100000 SUSPENSION ORAL at 12:01

## 2018-01-01 RX ADMIN — PROPOFOL 150 MG: 10 INJECTION, EMULSION INTRAVENOUS at 16:59

## 2018-01-01 RX ADMIN — ENOXAPARIN SODIUM 40 MG: 40 INJECTION SUBCUTANEOUS at 09:13

## 2018-01-01 RX ADMIN — POTASSIUM CHLORIDE 10 MEQ: 10 INJECTION, SOLUTION INTRAVENOUS at 15:24

## 2018-01-01 RX ADMIN — METOCLOPRAMIDE HYDROCHLORIDE 5 MG: 10 TABLET ORAL at 12:11

## 2018-01-01 RX ADMIN — Medication 10 ML: at 05:30

## 2018-01-01 RX ADMIN — METOCLOPRAMIDE HYDROCHLORIDE 5 MG: 10 TABLET ORAL at 21:05

## 2018-01-01 RX ADMIN — FAMOTIDINE 20 MG: 20 TABLET ORAL at 09:03

## 2018-01-01 RX ADMIN — PIPERACILLIN SODIUM,TAZOBACTAM SODIUM 3.38 G: 3; .375 INJECTION, POWDER, FOR SOLUTION INTRAVENOUS at 19:08

## 2018-01-01 RX ADMIN — PIPERACILLIN SODIUM,TAZOBACTAM SODIUM 3.38 G: 3; .375 INJECTION, POWDER, FOR SOLUTION INTRAVENOUS at 04:47

## 2018-01-01 RX ADMIN — SUCCINYLCHOLINE CHLORIDE 200 MG: 20 INJECTION INTRAMUSCULAR; INTRAVENOUS at 16:59

## 2018-01-01 RX ADMIN — METOPROLOL TARTRATE 2.5 MG: 5 INJECTION, SOLUTION INTRAVENOUS at 06:37

## 2018-01-01 RX ADMIN — AMLODIPINE BESYLATE 10 MG: 5 TABLET ORAL at 08:01

## 2018-01-01 RX ADMIN — PRAVASTATIN SODIUM 40 MG: 40 TABLET ORAL at 22:01

## 2018-01-01 RX ADMIN — SODIUM CHLORIDE 10 MG: 9 INJECTION INTRAMUSCULAR; INTRAVENOUS; SUBCUTANEOUS at 20:32

## 2018-01-01 RX ADMIN — CALCIUM CARBONATE (ANTACID) CHEW TAB 500 MG 200 MG: 500 CHEW TAB at 01:41

## 2018-01-01 RX ADMIN — Medication 10 ML: at 14:41

## 2018-01-01 RX ADMIN — METOCLOPRAMIDE HYDROCHLORIDE 5 MG: 10 TABLET ORAL at 07:57

## 2018-01-01 RX ADMIN — Medication 10 ML: at 14:37

## 2018-01-01 RX ADMIN — VITAMIN D, TAB 1000IU (100/BT) 2000 UNITS: 25 TAB at 09:20

## 2018-01-01 RX ADMIN — PIPERACILLIN SODIUM,TAZOBACTAM SODIUM 3.38 G: 3; .375 INJECTION, POWDER, FOR SOLUTION INTRAVENOUS at 12:30

## 2018-01-01 RX ADMIN — LABETALOL HYDROCHLORIDE 200 MG: 100 TABLET, FILM COATED ORAL at 08:29

## 2018-01-01 RX ADMIN — LABETALOL HYDROCHLORIDE 200 MG: 100 TABLET, FILM COATED ORAL at 08:33

## 2018-01-01 RX ADMIN — METOCLOPRAMIDE HYDROCHLORIDE 5 MG: 10 TABLET ORAL at 18:57

## 2018-01-01 RX ADMIN — FAMOTIDINE 20 MG: 20 TABLET ORAL at 09:20

## 2018-01-01 RX ADMIN — SODIUM CHLORIDE 25 ML/HR: 900 INJECTION, SOLUTION INTRAVENOUS at 12:46

## 2018-01-01 RX ADMIN — PRAVASTATIN SODIUM 40 MG: 40 TABLET ORAL at 21:33

## 2018-01-01 RX ADMIN — Medication 10 ML: at 07:28

## 2018-01-01 RX ADMIN — SODIUM CHLORIDE, SODIUM LACTATE, POTASSIUM CHLORIDE, CALCIUM CHLORIDE: 600; 310; 30; 20 INJECTION, SOLUTION INTRAVENOUS at 16:53

## 2018-01-01 RX ADMIN — ROCURONIUM BROMIDE 20 MG: 10 INJECTION, SOLUTION INTRAVENOUS at 18:04

## 2018-01-01 RX ADMIN — AMLODIPINE BESYLATE 10 MG: 5 TABLET ORAL at 09:21

## 2018-01-01 RX ADMIN — PIPERACILLIN SODIUM,TAZOBACTAM SODIUM 3.38 G: 3; .375 INJECTION, POWDER, FOR SOLUTION INTRAVENOUS at 11:18

## 2018-01-01 RX ADMIN — ACETAMINOPHEN 650 MG: 325 TABLET ORAL at 20:44

## 2018-01-01 RX ADMIN — LABETALOL HYDROCHLORIDE 200 MG: 100 TABLET, FILM COATED ORAL at 21:10

## 2018-01-01 RX ADMIN — PIPERACILLIN SODIUM,TAZOBACTAM SODIUM 3.38 G: 3; .375 INJECTION, POWDER, FOR SOLUTION INTRAVENOUS at 15:11

## 2018-01-01 RX ADMIN — PANTOPRAZOLE SODIUM 40 MG: 40 TABLET, DELAYED RELEASE ORAL at 07:28

## 2018-01-01 RX ADMIN — FENTANYL CITRATE 50 MCG: 50 INJECTION, SOLUTION INTRAMUSCULAR; INTRAVENOUS at 17:15

## 2018-01-01 RX ADMIN — PIPERACILLIN SODIUM,TAZOBACTAM SODIUM 3.38 G: 3; .375 INJECTION, POWDER, FOR SOLUTION INTRAVENOUS at 02:59

## 2018-01-01 RX ADMIN — HYDROCHLOROTHIAZIDE 25 MG: 25 TABLET ORAL at 08:08

## 2018-01-01 RX ADMIN — POTASSIUM CHLORIDE 20 MEQ: 750 TABLET, EXTENDED RELEASE ORAL at 08:22

## 2018-01-01 RX ADMIN — METOCLOPRAMIDE HYDROCHLORIDE 5 MG: 10 TABLET ORAL at 09:20

## 2018-01-01 RX ADMIN — LABETALOL HYDROCHLORIDE 200 MG: 100 TABLET, FILM COATED ORAL at 08:22

## 2018-01-01 RX ADMIN — EPHEDRINE SULFATE 10 MG: 50 INJECTION, SOLUTION INTRAVENOUS at 18:08

## 2018-01-01 RX ADMIN — ENOXAPARIN SODIUM 40 MG: 40 INJECTION SUBCUTANEOUS at 09:21

## 2018-01-01 RX ADMIN — TAMSULOSIN HYDROCHLORIDE 0.4 MG: 0.4 CAPSULE ORAL at 08:22

## 2018-01-01 RX ADMIN — TAMSULOSIN HYDROCHLORIDE 0.4 MG: 0.4 CAPSULE ORAL at 08:09

## 2018-01-01 RX ADMIN — FLUCONAZOLE 200 MG: 200 TABLET ORAL at 08:21

## 2018-01-01 RX ADMIN — PIPERACILLIN SODIUM,TAZOBACTAM SODIUM 3.38 G: 3; .375 INJECTION, POWDER, FOR SOLUTION INTRAVENOUS at 21:28

## 2018-01-01 RX ADMIN — FAMOTIDINE 20 MG: 20 TABLET ORAL at 21:05

## 2018-01-01 RX ADMIN — BENZOCAINE AND MENTHOL 1 LOZENGE: 15; 3.6 LOZENGE ORAL at 20:00

## 2018-01-01 RX ADMIN — Medication 10 ML: at 05:03

## 2018-01-01 RX ADMIN — PIPERACILLIN SODIUM,TAZOBACTAM SODIUM 3.38 G: 3; .375 INJECTION, POWDER, FOR SOLUTION INTRAVENOUS at 11:39

## 2018-01-01 RX ADMIN — METOCLOPRAMIDE HYDROCHLORIDE 5 MG: 10 TABLET ORAL at 11:44

## 2018-01-01 RX ADMIN — Medication 10 ML: at 15:51

## 2018-01-01 RX ADMIN — PRAVASTATIN SODIUM 40 MG: 40 TABLET ORAL at 20:32

## 2018-01-01 RX ADMIN — Medication 10 ML: at 03:22

## 2018-01-01 RX ADMIN — BENZOCAINE AND MENTHOL 1 LOZENGE: 15; 3.6 LOZENGE ORAL at 14:08

## 2018-01-01 RX ADMIN — METOCLOPRAMIDE HYDROCHLORIDE 5 MG: 10 TABLET ORAL at 08:35

## 2018-01-01 RX ADMIN — ONDANSETRON 4 MG: 2 INJECTION INTRAMUSCULAR; INTRAVENOUS at 17:52

## 2018-01-01 RX ADMIN — PIPERACILLIN SODIUM,TAZOBACTAM SODIUM 3.38 G: 3; .375 INJECTION, POWDER, FOR SOLUTION INTRAVENOUS at 05:16

## 2018-01-01 RX ADMIN — PIPERACILLIN SODIUM,TAZOBACTAM SODIUM 3.38 G: 3; .375 INJECTION, POWDER, FOR SOLUTION INTRAVENOUS at 03:57

## 2018-01-01 RX ADMIN — FLUCONAZOLE 200 MG: 200 TABLET ORAL at 08:28

## 2018-01-01 RX ADMIN — LABETALOL HYDROCHLORIDE 200 MG: 100 TABLET, FILM COATED ORAL at 21:05

## 2018-01-01 RX ADMIN — SODIUM CHLORIDE AND POTASSIUM CHLORIDE: 9; 2.98 INJECTION, SOLUTION INTRAVENOUS at 12:33

## 2018-01-01 RX ADMIN — HYDROCHLOROTHIAZIDE 25 MG: 25 TABLET ORAL at 08:55

## 2018-01-01 RX ADMIN — Medication 10 ML: at 21:03

## 2018-01-01 RX ADMIN — PIPERACILLIN SODIUM,TAZOBACTAM SODIUM 3.38 G: 3; .375 INJECTION, POWDER, FOR SOLUTION INTRAVENOUS at 20:29

## 2018-01-01 RX ADMIN — Medication 10 ML: at 04:43

## 2018-01-01 RX ADMIN — Medication 5 ML: at 05:45

## 2018-01-01 RX ADMIN — METOCLOPRAMIDE HYDROCHLORIDE 5 MG: 10 TABLET ORAL at 21:10

## 2018-01-01 RX ADMIN — BENZOCAINE AND MENTHOL 1 LOZENGE: 15; 3.6 LOZENGE ORAL at 20:58

## 2018-01-01 RX ADMIN — HYDROCHLOROTHIAZIDE 25 MG: 25 TABLET ORAL at 09:40

## 2018-01-01 RX ADMIN — Medication 10 ML: at 03:29

## 2018-01-01 RX ADMIN — HYDROCHLOROTHIAZIDE 25 MG: 25 TABLET ORAL at 08:46

## 2018-01-01 RX ADMIN — SODIUM CHLORIDE 10 MG: 9 INJECTION INTRAMUSCULAR; INTRAVENOUS; SUBCUTANEOUS at 03:19

## 2018-01-01 RX ADMIN — BENZOCAINE, BUTAMBEN, AND TETRACAINE HYDROCHLORIDE 1 SPRAY: .028; .004; .004 AEROSOL, SPRAY TOPICAL at 18:47

## 2018-01-01 RX ADMIN — AMLODIPINE BESYLATE 5 MG: 5 TABLET ORAL at 08:35

## 2018-01-01 RX ADMIN — FAMOTIDINE 20 MG: 20 TABLET ORAL at 21:26

## 2018-01-01 RX ADMIN — Medication 100 MG: at 09:05

## 2018-01-01 RX ADMIN — METOCLOPRAMIDE HYDROCHLORIDE 5 MG: 10 TABLET ORAL at 17:16

## 2018-01-01 RX ADMIN — PIPERACILLIN SODIUM,TAZOBACTAM SODIUM 3.38 G: 3; .375 INJECTION, POWDER, FOR SOLUTION INTRAVENOUS at 20:59

## 2018-01-01 RX ADMIN — METOCLOPRAMIDE HYDROCHLORIDE 5 MG: 10 TABLET ORAL at 08:34

## 2018-01-01 RX ADMIN — METOCLOPRAMIDE HYDROCHLORIDE 5 MG: 10 TABLET ORAL at 16:16

## 2018-01-01 RX ADMIN — POTASSIUM CHLORIDE 20 MEQ: 750 TABLET, EXTENDED RELEASE ORAL at 12:11

## 2018-01-01 RX ADMIN — SODIUM PHOSPHATE, MONOBASIC, MONOHYDRATE AND SODIUM PHOSPHATE, DIBASIC ANHYDROUS: 276; 142 INJECTION, SOLUTION INTRAVENOUS at 17:27

## 2018-01-01 RX ADMIN — PRAVASTATIN SODIUM 40 MG: 40 TABLET ORAL at 21:25

## 2018-01-01 RX ADMIN — LABETALOL HYDROCHLORIDE 200 MG: 100 TABLET, FILM COATED ORAL at 20:40

## 2018-01-01 RX ADMIN — TAMSULOSIN HYDROCHLORIDE 0.4 MG: 0.4 CAPSULE ORAL at 08:00

## 2018-01-01 RX ADMIN — SODIUM CHLORIDE, SODIUM LACTATE, POTASSIUM CHLORIDE, CALCIUM CHLORIDE: 600; 310; 30; 20 INJECTION, SOLUTION INTRAVENOUS at 18:21

## 2018-01-01 RX ADMIN — AMLODIPINE BESYLATE 10 MG: 5 TABLET ORAL at 08:35

## 2018-01-01 RX ADMIN — SODIUM CHLORIDE 10 MG: 9 INJECTION INTRAMUSCULAR; INTRAVENOUS; SUBCUTANEOUS at 13:50

## 2018-01-01 RX ADMIN — ACETAMINOPHEN 650 MG: 325 TABLET ORAL at 04:17

## 2018-01-01 RX ADMIN — AMLODIPINE BESYLATE 10 MG: 5 TABLET ORAL at 17:45

## 2018-01-01 RX ADMIN — POTASSIUM CHLORIDE 20 MEQ: 750 TABLET, EXTENDED RELEASE ORAL at 08:16

## 2018-01-01 RX ADMIN — METOCLOPRAMIDE HYDROCHLORIDE 5 MG: 10 TABLET ORAL at 11:50

## 2018-01-01 RX ADMIN — MORPHINE SULFATE 2 MG: 4 INJECTION INTRAVENOUS at 21:29

## 2018-01-01 RX ADMIN — SODIUM CHLORIDE 250 ML: 900 INJECTION, SOLUTION INTRAVENOUS at 10:53

## 2018-01-01 RX ADMIN — POTASSIUM CHLORIDE 20 MEQ: 1.5 POWDER, FOR SOLUTION ORAL at 09:40

## 2018-01-01 RX ADMIN — METOCLOPRAMIDE 5 MG: 5 INJECTION, SOLUTION INTRAMUSCULAR; INTRAVENOUS at 16:34

## 2018-01-01 RX ADMIN — SODIUM CHLORIDE 40 MG: 9 INJECTION INTRAMUSCULAR; INTRAVENOUS; SUBCUTANEOUS at 08:57

## 2018-01-01 RX ADMIN — MORPHINE SULFATE 2 MG: 4 INJECTION INTRAVENOUS at 19:27

## 2018-01-01 RX ADMIN — FLUCONAZOLE 200 MG: 200 TABLET ORAL at 08:16

## 2018-01-01 RX ADMIN — PIPERACILLIN SODIUM,TAZOBACTAM SODIUM 3.38 G: 3; .375 INJECTION, POWDER, FOR SOLUTION INTRAVENOUS at 12:09

## 2018-01-01 RX ADMIN — VITAMIN D, TAB 1000IU (100/BT) 2000 UNITS: 25 TAB at 08:01

## 2018-01-01 RX ADMIN — IOPAMIDOL 100 ML: 755 INJECTION, SOLUTION INTRAVENOUS at 16:57

## 2018-01-01 RX ADMIN — MIDAZOLAM 1 MG: 1 INJECTION INTRAMUSCULAR; INTRAVENOUS at 13:00

## 2018-01-01 RX ADMIN — POTASSIUM CHLORIDE 20 MEQ: 750 TABLET, EXTENDED RELEASE ORAL at 08:00

## 2018-01-01 RX ADMIN — SODIUM CHLORIDE 2060 MG: 900 INJECTION, SOLUTION INTRAVENOUS at 14:05

## 2018-01-01 RX ADMIN — PRAVASTATIN SODIUM 40 MG: 40 TABLET ORAL at 21:04

## 2018-01-01 RX ADMIN — EPHEDRINE SULFATE 10 MG: 50 INJECTION, SOLUTION INTRAVENOUS at 18:02

## 2018-01-01 RX ADMIN — PIPERACILLIN SODIUM,TAZOBACTAM SODIUM 3.38 G: 3; .375 INJECTION, POWDER, FOR SOLUTION INTRAVENOUS at 03:43

## 2018-01-01 RX ADMIN — Medication 10 ML: at 14:49

## 2018-01-01 RX ADMIN — Medication 80 MCG: at 17:13

## 2018-01-01 RX ADMIN — FAMOTIDINE 20 MG: 20 TABLET ORAL at 08:34

## 2018-01-01 RX ADMIN — SODIUM CHLORIDE 40 MG: 9 INJECTION INTRAMUSCULAR; INTRAVENOUS; SUBCUTANEOUS at 09:47

## 2018-01-01 RX ADMIN — METOCLOPRAMIDE HYDROCHLORIDE 5 MG: 10 TABLET ORAL at 12:33

## 2018-01-01 RX ADMIN — PIPERACILLIN SODIUM,TAZOBACTAM SODIUM 3.38 G: 3; .375 INJECTION, POWDER, FOR SOLUTION INTRAVENOUS at 13:06

## 2018-01-01 RX ADMIN — Medication 10 ML: at 16:04

## 2018-01-01 RX ADMIN — Medication 10 ML: at 14:57

## 2018-01-01 RX ADMIN — PRAVASTATIN SODIUM 40 MG: 40 TABLET ORAL at 21:10

## 2018-01-01 RX ADMIN — FLUCONAZOLE 200 MG: 200 TABLET ORAL at 08:48

## 2018-01-01 RX ADMIN — SODIUM CHLORIDE, PRESERVATIVE FREE 500 UNITS: 5 INJECTION INTRAVENOUS at 15:52

## 2018-01-01 RX ADMIN — Medication 10 ML: at 08:59

## 2018-01-01 RX ADMIN — LABETALOL HYDROCHLORIDE 200 MG: 100 TABLET, FILM COATED ORAL at 20:39

## 2018-01-01 RX ADMIN — MORPHINE SULFATE 2 MG: 4 INJECTION INTRAVENOUS at 03:19

## 2018-01-01 RX ADMIN — METOCLOPRAMIDE HYDROCHLORIDE 5 MG: 10 TABLET ORAL at 12:23

## 2018-01-01 RX ADMIN — SODIUM CHLORIDE AND POTASSIUM CHLORIDE: 9; 2.98 INJECTION, SOLUTION INTRAVENOUS at 20:59

## 2018-01-01 RX ADMIN — PRAVASTATIN SODIUM 40 MG: 40 TABLET ORAL at 20:40

## 2018-01-01 RX ADMIN — Medication 100 MG: at 08:55

## 2018-01-01 RX ADMIN — SODIUM CHLORIDE 100 ML/HR: 900 INJECTION, SOLUTION INTRAVENOUS at 10:32

## 2018-01-01 RX ADMIN — PIPERACILLIN SODIUM,TAZOBACTAM SODIUM 3.38 G: 3; .375 INJECTION, POWDER, FOR SOLUTION INTRAVENOUS at 12:31

## 2018-01-01 RX ADMIN — VITAMIN D, TAB 1000IU (100/BT) 2000 UNITS: 25 TAB at 08:35

## 2018-01-01 RX ADMIN — VITAMIN D, TAB 1000IU (100/BT) 2000 UNITS: 25 TAB at 08:21

## 2018-01-01 RX ADMIN — ENOXAPARIN SODIUM 40 MG: 40 INJECTION SUBCUTANEOUS at 08:36

## 2018-01-01 RX ADMIN — SODIUM CHLORIDE 1000 ML: 900 INJECTION, SOLUTION INTRAVENOUS at 16:57

## 2018-01-01 RX ADMIN — NYSTATIN 500000 UNITS: 100000 SUSPENSION ORAL at 20:41

## 2018-01-01 RX ADMIN — PROPOFOL 20 MG: 10 INJECTION, EMULSION INTRAVENOUS at 17:01

## 2018-01-01 RX ADMIN — Medication 10 ML: at 13:10

## 2018-01-01 RX ADMIN — METOCLOPRAMIDE HYDROCHLORIDE 5 MG: 10 TABLET ORAL at 12:18

## 2018-01-01 RX ADMIN — LABETALOL HYDROCHLORIDE 200 MG: 100 TABLET, FILM COATED ORAL at 09:40

## 2018-01-01 RX ADMIN — VITAMIN D, TAB 1000IU (100/BT) 2000 UNITS: 25 TAB at 08:55

## 2018-01-01 RX ADMIN — SODIUM CHLORIDE 10 MG: 9 INJECTION INTRAMUSCULAR; INTRAVENOUS; SUBCUTANEOUS at 15:09

## 2018-01-01 RX ADMIN — MORPHINE SULFATE 2 MG: 4 INJECTION INTRAVENOUS at 13:08

## 2018-01-01 RX ADMIN — POTASSIUM CHLORIDE 20 MEQ: 750 TABLET, EXTENDED RELEASE ORAL at 08:47

## 2018-01-01 RX ADMIN — Medication 10 ML: at 21:30

## 2018-01-01 RX ADMIN — BENZOCAINE AND MENTHOL 1 LOZENGE: 15; 3.6 LOZENGE ORAL at 09:10

## 2018-01-01 RX ADMIN — METOCLOPRAMIDE HYDROCHLORIDE 5 MG: 10 TABLET ORAL at 12:44

## 2018-01-01 RX ADMIN — AMLODIPINE BESYLATE 10 MG: 5 TABLET ORAL at 08:16

## 2018-01-01 RX ADMIN — FAMOTIDINE 20 MG: 20 TABLET ORAL at 20:41

## 2018-01-01 RX ADMIN — METOCLOPRAMIDE HYDROCHLORIDE 5 MG: 10 TABLET ORAL at 21:34

## 2018-01-01 RX ADMIN — PIPERACILLIN SODIUM,TAZOBACTAM SODIUM 3.38 G: 3; .375 INJECTION, POWDER, FOR SOLUTION INTRAVENOUS at 03:13

## 2018-01-01 RX ADMIN — DIATRIZOATE MEGLUMINE AND DIATRIZOATE SODIUM 120 ML: 660; 100 LIQUID ORAL; RECTAL at 19:30

## 2018-01-01 RX ADMIN — AMLODIPINE BESYLATE 10 MG: 5 TABLET ORAL at 08:29

## 2018-01-01 RX ADMIN — FENTANYL CITRATE 25 MCG: 50 INJECTION, SOLUTION INTRAMUSCULAR; INTRAVENOUS at 13:19

## 2018-01-01 RX ADMIN — DEXTROSE MONOHYDRATE, SODIUM CHLORIDE, AND POTASSIUM CHLORIDE 100 ML/HR: 50; 9; 1.49 INJECTION, SOLUTION INTRAVENOUS at 18:56

## 2018-01-01 RX ADMIN — METOCLOPRAMIDE HYDROCHLORIDE 5 MG: 10 TABLET ORAL at 10:06

## 2018-01-01 RX ADMIN — PRAVASTATIN SODIUM 40 MG: 40 TABLET ORAL at 20:02

## 2018-01-01 RX ADMIN — PIPERACILLIN SODIUM,TAZOBACTAM SODIUM 3.38 G: 3; .375 INJECTION, POWDER, FOR SOLUTION INTRAVENOUS at 13:02

## 2018-01-01 RX ADMIN — Medication 10 ML: at 00:57

## 2018-01-01 RX ADMIN — AMLODIPINE BESYLATE 10 MG: 5 TABLET ORAL at 08:47

## 2018-01-01 RX ADMIN — METOCLOPRAMIDE 5 MG: 5 INJECTION, SOLUTION INTRAMUSCULAR; INTRAVENOUS at 12:40

## 2018-01-01 RX ADMIN — Medication 100 MG: at 12:33

## 2018-01-01 RX ADMIN — Medication 10 ML: at 06:56

## 2018-01-01 RX ADMIN — POTASSIUM CHLORIDE 40 MEQ: 1500 TABLET, EXTENDED RELEASE ORAL at 01:58

## 2018-01-01 RX ADMIN — METOPROLOL TARTRATE 2.5 MG: 5 INJECTION, SOLUTION INTRAVENOUS at 19:09

## 2018-01-01 RX ADMIN — ACETAMINOPHEN 1000 MG: 10 INJECTION, SOLUTION INTRAVENOUS at 17:28

## 2018-01-01 RX ADMIN — PRAVASTATIN SODIUM 40 MG: 40 TABLET ORAL at 20:39

## 2018-01-01 RX ADMIN — MORPHINE SULFATE 2 MG: 4 INJECTION INTRAVENOUS at 12:26

## 2018-01-01 RX ADMIN — ENOXAPARIN SODIUM 40 MG: 40 INJECTION SUBCUTANEOUS at 10:53

## 2018-01-01 RX ADMIN — LABETALOL HYDROCHLORIDE 200 MG: 100 TABLET, FILM COATED ORAL at 20:01

## 2018-01-01 RX ADMIN — SODIUM CHLORIDE 100 ML/HR: 900 INJECTION, SOLUTION INTRAVENOUS at 01:59

## 2018-01-01 RX ADMIN — HYDROCHLOROTHIAZIDE 25 MG: 25 TABLET ORAL at 08:36

## 2018-01-01 RX ADMIN — IOPAMIDOL 100 ML: 755 INJECTION, SOLUTION INTRAVENOUS at 16:39

## 2018-01-01 RX ADMIN — NYSTATIN 500000 UNITS: 100000 SUSPENSION ORAL at 16:55

## 2018-01-01 RX ADMIN — VITAMIN D, TAB 1000IU (100/BT) 2000 UNITS: 25 TAB at 08:50

## 2018-01-01 RX ADMIN — Medication 10 ML: at 05:10

## 2018-01-01 RX ADMIN — PIPERACILLIN SODIUM,TAZOBACTAM SODIUM 3.38 G: 3; .375 INJECTION, POWDER, FOR SOLUTION INTRAVENOUS at 05:03

## 2018-01-01 RX ADMIN — LABETALOL HYDROCHLORIDE 200 MG: 100 TABLET, FILM COATED ORAL at 20:59

## 2018-01-01 RX ADMIN — POTASSIUM CHLORIDE 10 MEQ: 750 TABLET, FILM COATED, EXTENDED RELEASE ORAL at 08:01

## 2018-01-01 RX ADMIN — METOPROLOL TARTRATE 2.5 MG: 5 INJECTION, SOLUTION INTRAVENOUS at 13:09

## 2018-01-01 RX ADMIN — PIPERACILLIN SODIUM,TAZOBACTAM SODIUM 3.38 G: 3; .375 INJECTION, POWDER, FOR SOLUTION INTRAVENOUS at 13:39

## 2018-01-01 RX ADMIN — Medication 10 ML: at 13:02

## 2018-01-01 RX ADMIN — AMLODIPINE BESYLATE 10 MG: 5 TABLET ORAL at 08:46

## 2018-01-01 RX ADMIN — BENZOCAINE AND MENTHOL 1 LOZENGE: 15; 3.6 LOZENGE ORAL at 20:40

## 2018-01-01 RX ADMIN — ACETAMINOPHEN 650 MG: 325 TABLET ORAL at 00:35

## 2018-01-01 RX ADMIN — METOCLOPRAMIDE 5 MG: 5 INJECTION, SOLUTION INTRAMUSCULAR; INTRAVENOUS at 21:13

## 2018-01-01 RX ADMIN — BENZOCAINE AND MENTHOL 1 LOZENGE: 15; 3.6 LOZENGE ORAL at 09:06

## 2018-01-01 RX ADMIN — POTASSIUM CHLORIDE 10 MEQ: 750 TABLET, FILM COATED, EXTENDED RELEASE ORAL at 09:21

## 2018-01-01 RX ADMIN — MORPHINE SULFATE 2 MG: 4 INJECTION INTRAVENOUS at 20:52

## 2018-01-01 RX ADMIN — NYSTATIN 500000 UNITS: 100000 SUSPENSION ORAL at 21:23

## 2018-01-01 RX ADMIN — METOCLOPRAMIDE HYDROCHLORIDE 5 MG: 10 TABLET ORAL at 17:43

## 2018-01-01 RX ADMIN — Medication 100 MG: at 08:09

## 2018-01-01 RX ADMIN — LABETALOL HYDROCHLORIDE 200 MG: 100 TABLET, FILM COATED ORAL at 08:08

## 2018-01-01 RX ADMIN — FAMOTIDINE 20 MG: 20 TABLET ORAL at 08:49

## 2018-01-01 RX ADMIN — Medication 10 ML: at 13:13

## 2018-01-01 RX ADMIN — LABETALOL HYDROCHLORIDE 200 MG: 100 TABLET, FILM COATED ORAL at 08:16

## 2018-01-01 RX ADMIN — CALCIUM CARBONATE (ANTACID) CHEW TAB 500 MG 200 MG: 500 CHEW TAB at 19:51

## 2018-01-01 RX ADMIN — POTASSIUM CHLORIDE 20 MEQ: 750 TABLET, EXTENDED RELEASE ORAL at 08:34

## 2018-01-01 RX ADMIN — NEOSTIGMINE METHYLSULFATE 3 MG: 1 INJECTION INTRAVENOUS at 19:16

## 2018-01-01 RX ADMIN — FLUCONAZOLE 200 MG: 200 TABLET ORAL at 08:00

## 2018-01-01 RX ADMIN — Medication 5 ML: at 14:00

## 2018-01-01 RX ADMIN — METOCLOPRAMIDE HYDROCHLORIDE 5 MG: 10 TABLET ORAL at 08:49

## 2018-01-01 RX ADMIN — GLYCOPYRROLATE 0.5 MG: 0.2 INJECTION INTRAMUSCULAR; INTRAVENOUS at 19:16

## 2018-01-01 RX ADMIN — POTASSIUM CHLORIDE 40 MEQ: 750 TABLET, FILM COATED, EXTENDED RELEASE ORAL at 12:25

## 2018-01-01 RX ADMIN — NYSTATIN 500000 UNITS: 100000 SUSPENSION ORAL at 08:34

## 2018-01-01 RX ADMIN — BENZOCAINE AND MENTHOL 1 LOZENGE: 15; 3.6 LOZENGE ORAL at 11:33

## 2018-01-01 RX ADMIN — LABETALOL HYDROCHLORIDE 200 MG: 100 TABLET, FILM COATED ORAL at 21:04

## 2018-01-01 RX ADMIN — LABETALOL HYDROCHLORIDE 200 MG: 100 TABLET, FILM COATED ORAL at 08:46

## 2018-01-01 RX ADMIN — FLUCONAZOLE 200 MG: 200 TABLET ORAL at 09:03

## 2018-01-01 RX ADMIN — METOCLOPRAMIDE HYDROCHLORIDE 5 MG: 10 TABLET ORAL at 11:32

## 2018-01-01 RX ADMIN — Medication 10 ML: at 11:53

## 2018-01-01 RX ADMIN — AMLODIPINE BESYLATE 10 MG: 5 TABLET ORAL at 08:50

## 2018-01-01 RX ADMIN — VITAMIN D, TAB 1000IU (100/BT) 2000 UNITS: 25 TAB at 08:00

## 2018-01-01 RX ADMIN — Medication 100 MG: at 08:34

## 2018-01-01 RX ADMIN — LABETALOL HYDROCHLORIDE 200 MG: 100 TABLET, FILM COATED ORAL at 20:32

## 2018-01-01 RX ADMIN — METOCLOPRAMIDE HYDROCHLORIDE 5 MG: 10 TABLET ORAL at 16:46

## 2018-01-01 RX ADMIN — MORPHINE SULFATE 2 MG: 10 INJECTION INTRAVENOUS at 09:58

## 2018-01-01 RX ADMIN — SODIUM CHLORIDE AND POTASSIUM CHLORIDE: 9; 2.98 INJECTION, SOLUTION INTRAVENOUS at 03:32

## 2018-01-01 RX ADMIN — SODIUM CHLORIDE, PRESERVATIVE FREE 500 UNITS: 5 INJECTION INTRAVENOUS at 14:41

## 2018-01-01 RX ADMIN — ROCURONIUM BROMIDE 25 MG: 10 INJECTION, SOLUTION INTRAVENOUS at 17:14

## 2018-01-01 RX ADMIN — MIDAZOLAM 1 MG: 1 INJECTION INTRAMUSCULAR; INTRAVENOUS at 13:17

## 2018-01-01 RX ADMIN — LABETALOL HYDROCHLORIDE 200 MG: 100 TABLET, FILM COATED ORAL at 18:25

## 2018-01-01 RX ADMIN — LABETALOL HYDROCHLORIDE 200 MG: 100 TABLET, FILM COATED ORAL at 08:36

## 2018-01-01 RX ADMIN — Medication 10 ML: at 08:07

## 2018-01-01 RX ADMIN — FLUCONAZOLE 200 MG: 200 TABLET ORAL at 08:33

## 2018-01-01 RX ADMIN — PACLITAXEL 255 MG: 100 INJECTION, POWDER, LYOPHILIZED, FOR SUSPENSION INTRAVENOUS at 13:20

## 2018-01-01 RX ADMIN — SODIUM CHLORIDE, PRESERVATIVE FREE 300 UNITS: 5 INJECTION INTRAVENOUS at 13:24

## 2018-01-01 RX ADMIN — METOCLOPRAMIDE HYDROCHLORIDE 5 MG: 10 TABLET ORAL at 08:47

## 2018-01-01 RX ADMIN — LABETALOL HYDROCHLORIDE 200 MG: 100 TABLET, FILM COATED ORAL at 17:38

## 2018-01-01 RX ADMIN — Medication 10 ML: at 22:34

## 2018-01-01 RX ADMIN — FAMOTIDINE 20 MG: 10 INJECTION INTRAVENOUS at 09:33

## 2018-01-01 RX ADMIN — METOPROLOL TARTRATE 2.5 MG: 5 INJECTION, SOLUTION INTRAVENOUS at 12:29

## 2018-01-01 RX ADMIN — PIPERACILLIN SODIUM,TAZOBACTAM SODIUM 3.38 G: 3; .375 INJECTION, POWDER, FOR SOLUTION INTRAVENOUS at 20:18

## 2018-01-01 RX ADMIN — LIDOCAINE HYDROCHLORIDE 40 ML: 20 SOLUTION ORAL; TOPICAL at 20:54

## 2018-01-01 RX ADMIN — AMLODIPINE BESYLATE 10 MG: 5 TABLET ORAL at 09:40

## 2018-01-01 RX ADMIN — METOCLOPRAMIDE HYDROCHLORIDE 5 MG: 10 TABLET ORAL at 20:01

## 2018-01-01 RX ADMIN — Medication 10 ML: at 08:01

## 2018-01-01 RX ADMIN — METOCLOPRAMIDE HYDROCHLORIDE 5 MG: 10 TABLET ORAL at 09:03

## 2018-01-01 RX ADMIN — Medication 10 ML: at 23:01

## 2018-01-01 RX ADMIN — LIDOCAINE HYDROCHLORIDE 360 MG: 20 INJECTION, SOLUTION INFILTRATION; PERINEURAL at 13:23

## 2018-01-01 RX ADMIN — PRAVASTATIN SODIUM 40 MG: 40 TABLET ORAL at 21:05

## 2018-01-01 RX ADMIN — FAMOTIDINE 20 MG: 20 TABLET, FILM COATED ORAL at 17:59

## 2018-01-01 RX ADMIN — METOCLOPRAMIDE HYDROCHLORIDE 5 MG: 10 TABLET ORAL at 11:55

## 2018-01-01 RX ADMIN — FENTANYL CITRATE 25 MCG: 50 INJECTION, SOLUTION INTRAMUSCULAR; INTRAVENOUS at 13:15

## 2018-01-01 RX ADMIN — TAMSULOSIN HYDROCHLORIDE 0.4 MG: 0.4 CAPSULE ORAL at 08:50

## 2018-01-01 RX ADMIN — VITAMIN D, TAB 1000IU (100/BT) 2000 UNITS: 25 TAB at 08:47

## 2018-01-01 RX ADMIN — FAMOTIDINE 20 MG: 10 INJECTION, SOLUTION INTRAVENOUS at 20:33

## 2018-01-01 RX ADMIN — METOCLOPRAMIDE HYDROCHLORIDE 5 MG: 10 TABLET ORAL at 16:42

## 2018-01-01 RX ADMIN — PRAVASTATIN SODIUM 40 MG: 40 TABLET ORAL at 23:00

## 2018-01-01 RX ADMIN — ENOXAPARIN SODIUM 40 MG: 40 INJECTION SUBCUTANEOUS at 08:46

## 2018-01-01 RX ADMIN — HYDROCHLOROTHIAZIDE 25 MG: 25 TABLET ORAL at 08:01

## 2018-01-01 RX ADMIN — LABETALOL HYDROCHLORIDE 200 MG: 100 TABLET, FILM COATED ORAL at 08:00

## 2018-01-01 RX ADMIN — FAMOTIDINE 20 MG: 20 TABLET ORAL at 11:50

## 2018-01-01 RX ADMIN — ALUMINUM HYDROXIDE AND MAGNESIUM HYDROXIDE 30 ML: 200; 200 SUSPENSION ORAL at 16:55

## 2018-01-01 RX ADMIN — MORPHINE SULFATE 2 MG: 4 INJECTION INTRAVENOUS at 05:03

## 2018-01-01 RX ADMIN — NYSTATIN 500000 UNITS: 100000 SUSPENSION ORAL at 12:11

## 2018-01-01 RX ADMIN — METOCLOPRAMIDE 5 MG: 5 INJECTION, SOLUTION INTRAMUSCULAR; INTRAVENOUS at 09:33

## 2018-01-01 RX ADMIN — METOCLOPRAMIDE HYDROCHLORIDE 5 MG: 10 TABLET ORAL at 16:47

## 2018-01-01 RX ADMIN — PANTOPRAZOLE SODIUM 40 MG: 40 TABLET, DELAYED RELEASE ORAL at 09:20

## 2018-01-01 RX ADMIN — Medication 2 G: at 12:46

## 2018-01-01 RX ADMIN — TAMSULOSIN HYDROCHLORIDE 0.4 MG: 0.4 CAPSULE ORAL at 08:35

## 2018-01-01 RX ADMIN — POTASSIUM CHLORIDE 10 MEQ: 10 INJECTION, SOLUTION INTRAVENOUS at 17:28

## 2018-01-01 RX ADMIN — BENZOCAINE AND MENTHOL 1 LOZENGE: 15; 3.6 LOZENGE ORAL at 21:38

## 2018-01-01 RX ADMIN — LIDOCAINE HYDROCHLORIDE 60 MG: 20 INJECTION, SOLUTION EPIDURAL; INFILTRATION; INTRACAUDAL; PERINEURAL at 16:59

## 2018-01-01 RX ADMIN — POTASSIUM CHLORIDE 20 MEQ: 750 TABLET, EXTENDED RELEASE ORAL at 08:49

## 2018-01-01 RX ADMIN — LABETALOL HYDROCHLORIDE 200 MG: 100 TABLET, FILM COATED ORAL at 09:03

## 2018-01-01 RX ADMIN — ENOXAPARIN SODIUM 40 MG: 40 INJECTION SUBCUTANEOUS at 10:06

## 2018-01-01 RX ADMIN — SODIUM CHLORIDE 50 ML/HR: 900 INJECTION, SOLUTION INTRAVENOUS at 16:04

## 2018-01-01 RX ADMIN — GADOTERATE MEGLUMINE 17 ML: 376.9 INJECTION INTRAVENOUS at 21:31

## 2018-01-01 RX ADMIN — MORPHINE SULFATE 2 MG: 4 INJECTION INTRAVENOUS at 10:18

## 2018-01-01 RX ADMIN — PIPERACILLIN SODIUM,TAZOBACTAM SODIUM 3.38 G: 3; .375 INJECTION, POWDER, FOR SOLUTION INTRAVENOUS at 05:12

## 2018-01-01 RX ADMIN — PRAVASTATIN SODIUM 40 MG: 40 TABLET ORAL at 21:27

## 2018-01-01 RX ADMIN — Medication 10 ML: at 06:37

## 2018-01-01 RX ADMIN — LORAZEPAM 0.5 MG: 2 INJECTION INTRAMUSCULAR; INTRAVENOUS at 18:13

## 2018-01-01 RX ADMIN — Medication 10 ML: at 15:11

## 2018-01-01 RX ADMIN — FENTANYL CITRATE 50 MCG: 50 INJECTION, SOLUTION INTRAMUSCULAR; INTRAVENOUS at 16:59

## 2018-01-01 RX ADMIN — SODIUM CHLORIDE 50 ML/HR: 900 INJECTION, SOLUTION INTRAVENOUS at 15:59

## 2018-01-01 RX ADMIN — Medication 20 ML: at 11:25

## 2018-01-01 RX ADMIN — METOCLOPRAMIDE HYDROCHLORIDE 5 MG: 10 TABLET ORAL at 08:21

## 2018-01-01 RX ADMIN — PRAVASTATIN SODIUM 40 MG: 40 TABLET ORAL at 20:59

## 2018-01-01 RX ADMIN — METOCLOPRAMIDE HYDROCHLORIDE 5 MG: 10 TABLET ORAL at 16:44

## 2018-01-01 RX ADMIN — METOCLOPRAMIDE HYDROCHLORIDE 5 MG: 10 TABLET ORAL at 20:41

## 2018-01-01 RX ADMIN — METOCLOPRAMIDE HYDROCHLORIDE 5 MG: 10 TABLET ORAL at 22:01

## 2018-01-01 RX ADMIN — PIPERACILLIN SODIUM,TAZOBACTAM SODIUM 3.38 G: 3; .375 INJECTION, POWDER, FOR SOLUTION INTRAVENOUS at 17:10

## 2018-01-01 RX ADMIN — Medication 10 ML: at 20:29

## 2018-01-01 RX ADMIN — FLUCONAZOLE 200 MG: 200 TABLET ORAL at 12:23

## 2018-01-01 RX ADMIN — Medication 10 ML: at 15:27

## 2018-01-01 RX ADMIN — PRAVASTATIN SODIUM 40 MG: 40 TABLET ORAL at 20:41

## 2018-01-01 RX ADMIN — LABETALOL HYDROCHLORIDE 200 MG: 100 TABLET, FILM COATED ORAL at 08:01

## 2018-01-01 RX ADMIN — PIPERACILLIN SODIUM,TAZOBACTAM SODIUM 3.38 G: 3; .375 INJECTION, POWDER, FOR SOLUTION INTRAVENOUS at 19:27

## 2018-01-01 RX ADMIN — BENZOCAINE AND MENTHOL 1 LOZENGE: 15; 3.6 LOZENGE ORAL at 20:02

## 2018-01-01 RX ADMIN — TAMSULOSIN HYDROCHLORIDE 0.4 MG: 0.4 CAPSULE ORAL at 09:03

## 2018-01-01 RX ADMIN — LABETALOL HYDROCHLORIDE 200 MG: 100 TABLET, FILM COATED ORAL at 18:23

## 2018-01-01 RX ADMIN — METOPROLOL TARTRATE 2 MG: 5 INJECTION INTRAVENOUS at 17:09

## 2018-01-01 RX ADMIN — METOCLOPRAMIDE HYDROCHLORIDE 5 MG: 10 TABLET ORAL at 13:10

## 2018-01-01 RX ADMIN — METOCLOPRAMIDE HYDROCHLORIDE 5 MG: 10 TABLET ORAL at 12:30

## 2018-01-01 RX ADMIN — Medication 10 ML: at 10:27

## 2018-01-01 RX ADMIN — Medication 10 ML: at 22:36

## 2018-01-01 RX ADMIN — POTASSIUM CHLORIDE 20 MEQ: 1.5 POWDER, FOR SOLUTION ORAL at 09:14

## 2018-01-01 RX ADMIN — METOCLOPRAMIDE HYDROCHLORIDE 5 MG: 10 TABLET ORAL at 21:25

## 2018-01-01 RX ADMIN — VITAMIN D, TAB 1000IU (100/BT) 2000 UNITS: 25 TAB at 08:16

## 2018-01-01 RX ADMIN — LABETALOL HYDROCHLORIDE 200 MG: 100 TABLET, FILM COATED ORAL at 08:47

## 2018-01-01 RX ADMIN — TAMSULOSIN HYDROCHLORIDE 0.4 MG: 0.4 CAPSULE ORAL at 08:16

## 2018-01-01 RX ADMIN — SODIUM CHLORIDE 10 MG: 9 INJECTION INTRAMUSCULAR; INTRAVENOUS; SUBCUTANEOUS at 00:35

## 2018-01-01 RX ADMIN — SODIUM CHLORIDE AND POTASSIUM CHLORIDE: 9; 2.98 INJECTION, SOLUTION INTRAVENOUS at 15:45

## 2018-01-01 RX ADMIN — Medication 10 ML: at 00:08

## 2018-01-01 RX ADMIN — AMLODIPINE BESYLATE 10 MG: 5 TABLET ORAL at 09:03

## 2018-01-01 RX ADMIN — VITAMIN D, TAB 1000IU (100/BT) 2000 UNITS: 25 TAB at 08:29

## 2018-01-01 RX ADMIN — Medication 100 MG: at 08:50

## 2018-01-01 RX ADMIN — TAMSULOSIN HYDROCHLORIDE 0.4 MG: 0.4 CAPSULE ORAL at 08:29

## 2018-01-01 RX ADMIN — ACETAMINOPHEN 650 MG: 325 TABLET ORAL at 20:01

## 2018-01-01 RX ADMIN — LABETALOL HYDROCHLORIDE 200 MG: 100 TABLET, FILM COATED ORAL at 08:49

## 2018-01-01 RX ADMIN — PIPERACILLIN SODIUM,TAZOBACTAM SODIUM 3.38 G: 3; .375 INJECTION, POWDER, FOR SOLUTION INTRAVENOUS at 10:19

## 2018-01-01 RX ADMIN — Medication 10 ML: at 13:23

## 2018-01-01 RX ADMIN — MORPHINE SULFATE 2 MG: 4 INJECTION INTRAVENOUS at 00:08

## 2018-01-01 RX ADMIN — FAMOTIDINE 20 MG: 20 TABLET ORAL at 08:00

## 2018-01-01 RX ADMIN — Medication 20 ML: at 11:09

## 2018-01-01 RX ADMIN — Medication 100 MG: at 09:00

## 2018-01-01 RX ADMIN — PIPERACILLIN SODIUM,TAZOBACTAM SODIUM 3.38 G: 3; .375 INJECTION, POWDER, FOR SOLUTION INTRAVENOUS at 18:25

## 2018-04-28 PROBLEM — K86.89 PANCREATIC MASS: Status: ACTIVE | Noted: 2018-01-01

## 2018-04-28 PROBLEM — K40.31: Status: ACTIVE | Noted: 2018-01-01

## 2018-04-28 NOTE — ANESTHESIA PREPROCEDURE EVALUATION
Anesthetic History   No history of anesthetic complications            Review of Systems / Medical History  Patient summary reviewed, nursing notes reviewed and pertinent labs reviewed    Pulmonary          Shortness of breath (chronic)         Neuro/Psych         TIA (tingling in arm 2000--not a stroke according to pt, but takes plavix)     Cardiovascular    Hypertension: poorly controlled          Hyperlipidemia    Exercise tolerance: >4 METS  Comments: Negative cardiac w/u per pt;done at [address] [place] [address] Dr, to evaluate SOB   GI/Hepatic/Renal     GERD (occasional only, Tums prn): well controlled           Endo/Other        Arthritis (generalized; neck & fingers included) and anemia     Other Findings   Comments:   Pancreatic mass      Hernia, inguinal, recurrent              Physical Exam    Airway  Mallampati: III  TM Distance: 4 - 6 cm  Neck ROM: decreased range of motion   Mouth opening: Normal     Cardiovascular    Rhythm: regular  Rate: normal      Pertinent negatives: No murmur   Dental    Dentition: Caps/crowns     Pulmonary  Breath sounds clear to auscultation               Abdominal  GI exam deferred       Other Findings            Anesthetic Plan    ASA: 3  Anesthesia type: general - interscalene block    Monitoring Plan: BIS      Induction: Intravenous  Anesthetic plan and risks discussed with: Patient      Patient was scheduled for a stress test yesterday at the [facility] for ECG changes and SOB with uncontrolled hypertension. This was not done due to development of symptomatology related to his hernia. He came to our ER this morning complaining of N&V and  \"Chest pain\". Discussed with cardiology especially in light of new ECG changes and they will evaluate the patient prior to us taking him to the OR. He has had to skip his last few doses of his beta blocker due to above symptoms.

## 2018-04-28 NOTE — CONSULTS
Cardiology Consult Note       Date of  Admission: 4/28/2018  7:44 AM     Admission type:Emergency     Subjective:     Mr. Juliana Ty is admitted with incarcerated left inguinal hernia. Asked to evaluate due to abnormal EKG. He has h/o TIA, HTN. Normal cath 35 years ago. Remains active. Denies any chest pain, SOB up until this morning. Normally followed at [facility]. Was scheduled for a stress test due to HTN. No prior CAD, CHF, arrhythmia. EKG shows sinus rhythm with RBBB, T wave inversions in lateral leads. Patient Active Problem List    Diagnosis Date Noted    Pancreatic mass 04/28/2018    Hernia, inguinal, recurrent, with obstruction 04/28/2018      PROVIDER UNKNOWN  Past Medical History:   Diagnosis Date    Arthritis     GERD (gastroesophageal reflux disease)     High cholesterol     Hypertension     Other ill-defined conditions(799.89) ~2000    arm tingling to ER, per pt states was not a stroke, but has been on plavix and aggrenox since. currently on plavix      Past Surgical History:   Procedure Laterality Date    HX CATARACT REMOVAL Bilateral 2008 and 2012    HX HERNIA REPAIR Left     HX ORTHOPAEDIC Right     elbow     TOTAL KNEE ARTHROPLASTY Left 1999    TOTAL KNEE ARTHROPLASTY Right 2011     Allergies   Allergen Reactions    Aleve [Naproxen Sodium] Swelling     Throat swelling      History reviewed. No pertinent family history.    Current Facility-Administered Medications   Medication Dose Route Frequency    sodium chloride (NS) flush 5-10 mL  5-10 mL IntraVENous Q8H    sodium chloride (NS) flush 5-10 mL  5-10 mL IntraVENous PRN    0.9% sodium chloride infusion  100 mL/hr IntraVENous CONTINUOUS    ondansetron (ZOFRAN) injection 4 mg  4 mg IntraVENous Q4H PRN    piperacillin-tazobactam (ZOSYN) 3.375 g in 0.9% sodium chloride (MBP/ADV) 100 mL  3.375 g IntraVENous ONCE    Followed by    piperacillin-tazobactam (ZOSYN) 3.375 g in 0.9% sodium chloride (MBP/ADV) 100 mL  3.375 g IntraVENous Q8H  hydrALAZINE (APRESOLINE) 20 mg/mL injection 10 mg  10 mg IntraVENous Q6H PRN     Current Outpatient Prescriptions   Medication Sig    amLODIPine (NORVASC) 10 mg tablet Take 10 mg by mouth daily.  clonazePAM (KLONOPIN) 0.5 mg tablet Take 0.5 mg by mouth nightly as needed.  labetalol (NORMODYNE) 200 mg tablet Take 200 mg by mouth two (2) times a day. Indications: HYPERTENSION    hydrochlorothiazide (HYDRODIURIL) 25 mg tablet Take 25 mg by mouth daily. Indications: HYPERTENSION    potassium chloride SR (KLOR-CON 10) 10 mEq tablet Take 20 mEq by mouth. Indications: HYPOKALEMIA PREVENTION    pravastatin (PRAVACHOL) 40 mg tablet Take 40 mg by mouth nightly. Indications: HYPERCHOLESTEROLEMIA    clopidogrel (PLAVIX) 75 mg tablet Take 75 mg by mouth daily.  thiamine (VITAMIN B-1) 100 mg tablet Take 100 mg by mouth daily.  vitamin E (AQUA GEMS) 400 unit capsule Take 800 Units by mouth daily.  Cholecalciferol, Vitamin D3, (VITAMIN D3) 1,000 unit cap Take 2 Tabs by mouth daily.  omega-3 fatty acids-vitamin e (FISH OIL) 1,000 mg cap Take 2 Caps by mouth daily. Review of Symptoms:  A comprehensive review of systems was negative except for that written in the HPI. Physical Exam    Visit Vitals    /70    Pulse 76    Temp 97.4 °F (36.3 °C)    Resp 23    Wt 199 lb 1.2 oz (90.3 kg)    SpO2 94%    BMI 27 kg/m2     General Appearance:  Well developed, well nourished,alert and oriented x 3, and individual in no acute distress. Ears/Nose/Mouth/Throat:   Hearing grossly normal.         Neck: Supple. Chest:   Lungs clear to auscultation bilaterally. Cardiovascular:  Regular rate and rhythm, S1, S2 normal, no murmur. Abdomen:   deferred   Extremities: No edema bilaterally. Skin: Warm and dry.                Cardiographics    Telemetry: normal sinus rhythm  ECG: normal EKG, normal sinus rhythm, unchanged from previous tracings, RBBB  Echocardiogram: Not done    Labs: Recent Results (from the past 24 hour(s))   EKG, 12 LEAD, INITIAL    Collection Time: 04/28/18  7:53 AM   Result Value Ref Range    Ventricular Rate 77 BPM    Atrial Rate 77 BPM    P-R Interval 148 ms    QRS Duration 128 ms    Q-T Interval 424 ms    QTC Calculation (Bezet) 479 ms    Calculated P Axis -11 degrees    Calculated R Axis -121 degrees    Calculated T Axis 39 degrees    Diagnosis       Normal sinus rhythm  Right bundle branch block  T wave abnormality, consider lateral ischemia  Abnormal ECG  When compared with ECG of 17-FEB-2014 09:35,  Right bundle branch block is now present     CBC WITH AUTOMATED DIFF    Collection Time: 04/28/18  8:34 AM   Result Value Ref Range    WBC 14.6 (H) 4.1 - 11.1 K/uL    RBC 4.29 4. 10 - 5.70 M/uL    HGB 15.2 12.1 - 17.0 g/dL    HCT 42.5 36.6 - 50.3 %    MCV 99.1 (H) 80.0 - 99.0 FL    MCH 35.4 (H) 26.0 - 34.0 PG    MCHC 35.8 30.0 - 36.5 g/dL    RDW 12.2 11.5 - 14.5 %    PLATELET 252 896 - 355 K/uL    MPV 9.9 8.9 - 12.9 FL    NRBC 0.0 0  WBC    ABSOLUTE NRBC 0.00 0.00 - 0.01 K/uL    NEUTROPHILS 89 (H) 32 - 75 %    LYMPHOCYTES 5 (L) 12 - 49 %    MONOCYTES 6 5 - 13 %    EOSINOPHILS 0 0 - 7 %    BASOPHILS 0 0 - 1 %    IMMATURE GRANULOCYTES 0 0.0 - 0.5 %    ABS. NEUTROPHILS 13.0 (H) 1.8 - 8.0 K/UL    ABS. LYMPHOCYTES 0.7 (L) 0.8 - 3.5 K/UL    ABS. MONOCYTES 0.9 0.0 - 1.0 K/UL    ABS. EOSINOPHILS 0.0 0.0 - 0.4 K/UL    ABS. BASOPHILS 0.0 0.0 - 0.1 K/UL    ABS. IMM.  GRANS. 0.0 0.00 - 0.04 K/UL    DF SMEAR SCANNED      RBC COMMENTS NORMOCYTIC, NORMOCHROMIC     METABOLIC PANEL, COMPREHENSIVE    Collection Time: 04/28/18  8:34 AM   Result Value Ref Range    Sodium 133 (L) 136 - 145 mmol/L    Potassium 3.9 3.5 - 5.1 mmol/L    Chloride 97 97 - 108 mmol/L    CO2 28 21 - 32 mmol/L    Anion gap 8 5 - 15 mmol/L    Glucose 190 (H) 65 - 100 mg/dL    BUN 28 (H) 6 - 20 MG/DL    Creatinine 1.24 0.70 - 1.30 MG/DL    BUN/Creatinine ratio 23 (H) 12 - 20      GFR est AA >60 >60 ml/min/1.73m2 GFR est non-AA 55 (L) >60 ml/min/1.73m2    Calcium 9.6 8.5 - 10.1 MG/DL    Bilirubin, total 1.6 (H) 0.2 - 1.0 MG/DL    ALT (SGPT) 17 12 - 78 U/L    AST (SGOT) 15 15 - 37 U/L    Alk.  phosphatase 76 45 - 117 U/L    Protein, total 7.0 6.4 - 8.2 g/dL    Albumin 3.5 3.5 - 5.0 g/dL    Globulin 3.5 2.0 - 4.0 g/dL    A-G Ratio 1.0 (L) 1.1 - 2.2     LACTIC ACID    Collection Time: 04/28/18  8:34 AM   Result Value Ref Range    Lactic acid 1.7 0.4 - 2.0 MMOL/L   LIPASE    Collection Time: 04/28/18  8:34 AM   Result Value Ref Range    Lipase 50 (L) 73 - 393 U/L   MAGNESIUM    Collection Time: 04/28/18  8:34 AM   Result Value Ref Range    Magnesium 1.6 1.6 - 2.4 mg/dL   TROPONIN I    Collection Time: 04/28/18  8:34 AM   Result Value Ref Range    Troponin-I, Qt. <0.04 <0.05 ng/mL   CK W/ CKMB & INDEX    Collection Time: 04/28/18  8:34 AM   Result Value Ref Range    CK 32 (L) 39 - 308 U/L    CK - MB 2.0 <3.6 NG/ML    CK-MB Index 6.3 (H) 0 - 2.5     URINALYSIS W/ REFLEX CULTURE    Collection Time: 04/28/18 11:08 AM   Result Value Ref Range    Color DARK YELLOW      Appearance CLEAR CLEAR      Specific gravity 1.025 1.003 - 1.030      pH (UA) 5.5 5.0 - 8.0      Protein 30 (A) NEG mg/dL    Glucose NEGATIVE  NEG mg/dL    Ketone TRACE (A) NEG mg/dL    Blood NEGATIVE  NEG      Urobilinogen 1.0 0.2 - 1.0 EU/dL    Nitrites NEGATIVE  NEG      Leukocyte Esterase NEGATIVE  NEG      UA:UC IF INDICATED CULTURE NOT INDICATED BY UA RESULT CNI      WBC 0-4 0 - 4 /hpf    RBC 0-5 0 - 5 /hpf    Epithelial cells FEW FEW /lpf    Bacteria NEGATIVE  NEG /hpf    Hyaline cast 0-2 0 - 5 /lpf   BILIRUBIN, CONFIRM    Collection Time: 04/28/18 11:08 AM   Result Value Ref Range    Bilirubin UA, confirm NEGATIVE  NEG          Assessment:     Assessment:       Active Problems:    Pancreatic mass (4/28/2018)      Hernia, inguinal, recurrent, with obstruction (4/28/2018)         Plan:     Patient is at moderate to high risk for surgery given urgent nature of surgery, advanced age and EKG changes. However, benefits outweigh the risks of proceeding with urgent surgery without further delay for cardiac evaluation. Will obtain stat echo. Follow serial EKG, enzymes post operatively. Discussed with Dr. Miya Barahona, family. Thank you for the consult.     Luzma Green MD

## 2018-04-28 NOTE — PERIOP NOTES
TRANSFER - IN REPORT:    Verbal report received from [facility] RN(name) on Precious Roller  being received from ER 23(unit) for ordered procedure      Report consisted of patients Situation, Background, Assessment and   Recommendations(SBAR). Information from the following report(s) SBAR, Kardex, ED Summary, OR Summary, Procedure Summary, Intake/Output, MAR, Accordion, Recent Results, Med Rec Status, Cardiac Rhythm ST and Alarm Parameters  was reviewed with the receiving nurse. Opportunity for questions and clarification was provided. Assessment completed upon patients arrival to unit and care assumed.

## 2018-04-28 NOTE — H&P
Surgery History and Physical    Subjective: Mookie Sender is a 80 y.o. male who presents for evaluation of vomiting, abdominal pain, LIH which cannot be pushed back in. Problem has been going on for 4-5 days and gradually getting worse. NO BM of flatus during that period. .   Usually gets care at [address] [place] [address] 40 yrs ago and it came back, can usually push it back in.    CT scan. PANCREAS: Heterogeneously enhancing complex mass in the pancreatic tail and  body, measuring 6.7 x 4.8 x 4.6 cm, containing calcification without obvious  associated ductal dilatation. While the fat plane between this mass and the  gastric wall is obliterated, this mass is not felt to arise from stomach. STOMACH: Distended with fluid. SMALL BOWEL: Mildly to moderately distended. COLON: Distended with fluid throughout the right colon, transverse colon and  left colon to the level of incarceration of sigmoid colon in a left inguinal  hernia. Numerous diverticula in the sigmoid colon without associated acute  inflammatory change at this time. Past Medical History:   Diagnosis Date    Arthritis     GERD (gastroesophageal reflux disease)     High cholesterol     Hypertension     Other ill-defined conditions(799.89) ~2000    arm tingling to ER, per pt states was not a stroke, but has been on plavix and aggrenox since. currently on plavix     Past Surgical History:   Procedure Laterality Date    HX CATARACT REMOVAL Bilateral 2008 and 2012    HX HERNIA REPAIR Left     HX ORTHOPAEDIC Right     elbow     TOTAL KNEE ARTHROPLASTY Left 1999    TOTAL KNEE ARTHROPLASTY Right 2011      History reviewed. No pertinent family history. Social History   Substance Use Topics    Smoking status: Never Smoker    Smokeless tobacco: Not on file    Alcohol use No      Prior to Admission medications    Medication Sig Start Date End Date Taking? Authorizing Provider   amLODIPine (NORVASC) 10 mg tablet Take 10 mg by mouth daily. Yes Richy Stanley, MD   clonazePAM (KLONOPIN) 0.5 mg tablet Take 0.5 mg by mouth nightly as needed. Yes Richy Stanley MD   labetalol (NORMODYNE) 200 mg tablet Take 200 mg by mouth two (2) times a day. Indications: HYPERTENSION   Yes Historical Provider   hydrochlorothiazide (HYDRODIURIL) 25 mg tablet Take 25 mg by mouth daily. Indications: HYPERTENSION   Yes Historical Provider   potassium chloride SR (KLOR-CON 10) 10 mEq tablet Take 20 mEq by mouth. Indications: HYPOKALEMIA PREVENTION   Yes Historical Provider   pravastatin (PRAVACHOL) 40 mg tablet Take 40 mg by mouth nightly. Indications: HYPERCHOLESTEROLEMIA   Yes Historical Provider   clopidogrel (PLAVIX) 75 mg tablet Take 75 mg by mouth daily. Yes Historical Provider   thiamine (VITAMIN B-1) 100 mg tablet Take 100 mg by mouth daily. Yes Historical Provider   vitamin E (AQUA GEMS) 400 unit capsule Take 800 Units by mouth daily. Yes Historical Provider   Cholecalciferol, Vitamin D3, (VITAMIN D3) 1,000 unit cap Take 2 Tabs by mouth daily. Yes Historical Provider   omega-3 fatty acids-vitamin e (FISH OIL) 1,000 mg cap Take 2 Caps by mouth daily. Yes Historical Provider      Allergies   Allergen Reactions    Aleve [Naproxen Sodium] Swelling     Throat swelling       Review of Systems see HPI/PMH    Objective:     Patient Vitals for the past 8 hrs:   BP Temp Pulse Resp SpO2 Weight   18 1100 160/80 - 76 20 96 % -   18 1000 156/79 - 71 - 95 % -   18 0900 152/66 - 76 17 95 % -   18 0757 (!) 175/91 97.4 °F (36.3 °C) 77 12 95 % 199 lb 1.2 oz (90.3 kg)       Temp (24hrs), Av.4 °F (36.3 °C), Min:97.4 °F (36.3 °C), Max:97.4 °F (36.3 °C)      Physical Exam Constitutional: He is oriented to person, place, and time. He appears well-developed and well-nourished. No acute distress. Vomited as NG not in stomach  HENT:   Head: Normocephalic and atraumatic. Eyes: EOM are normal.  No scleral icterus. Neck:  No tracheal deviation present. Cardiovascular: Normal rate, regular rhythm  Pulmonary/Chest: Effort normal .   Abdominal: Soft. He exhibits distension. There is generalized tenderness. There is no rebound or guarding  Incarcerated  Large LIH. Musculoskeletal grossly wnl  Neurological: He is alert and oriented to person, place, and time. Skin: Skin is warm and dry. Psychiatric: He has a normal mood and affect. Assessment:     Incarcerated recurrent left inguinal hernia, containing sigmoid colon, with obstruction    Pancreatic mass    HTN      Plan:   Admit for left inguinal hernia surgery    Hospitalist consult    Will work up pancreatic mass postop or as outpt. Discussed the risk of surgery including but not limited to bleeding, infection, recurrence of hernia,further surgery including exploratory laparotomy,  and the risks of general anesthetic. The patient understands the risks; any and all questions were answered to the patient's satisfaction.     Signed By: Adrienne Will MD   [facility]    April 28, 2018

## 2018-04-28 NOTE — CONSULTS
HOSPITALIST CONSULT    Primary Care Provider: PROVIDER UNKNOWN  Consult requested by: Anival Adorno MD  Reason for Consult: Pre-op clearance    Subjective: Rachael Bermudez is a 80 y.o. male with a past medical history that includes hypertension, hyperlipidemia, and ENMA on CPAP but otherwise healthy male presented with an incarcerated left inguinal hernia. Patient reports that his last BM was approximately 4/24/18 which is not his norm to go this long. Soon after he stopped being flatulent and was developing LLQ discomfort and intermittent dull pain radiating into the back at times a/w nausea and vomiting including several times while in the ER. He reports drinking coffee to see if this helped with BMs but it didn't and he decided to come to ER. In the ER he was found to have an incarcerated left inguinal hernia. In addition a pancreatic mass was seen that the patient has no knowledge of. He has a leukocytosis of 14.6 but no fevers or chills. Glucose was elevated at 190 but he denies history of DM. Bilirubin was elevated at 1.6. Lactic acid was within normal limits. We are asked to see the patient in consult for pre-op clearance. Patient denies CP, SOB, cardiac history other than HTN, also denies pulmonary history other than ENMA on CPAP. Review of Systems:  Further 14 point review of systems is benign. A comprehensive review of systems was negative except for: Gastrointestinal: positive for reflux symptoms, nausea, vomiting, change in bowel habits, constipation and abdominal pain     Past Medical History:   Diagnosis Date    Arthritis     GERD (gastroesophageal reflux disease)     High cholesterol     Hypertension     Other ill-defined conditions(799.89) ~2000    arm tingling to ER, per pt states was not a stroke, but has been on plavix and aggrenox since.   currently on plavix      Past Surgical History:   Procedure Laterality Date    HX CATARACT REMOVAL Bilateral 2008 and 2012    HX HERNIA REPAIR Left     HX ORTHOPAEDIC Right     elbow     TOTAL KNEE ARTHROPLASTY Left 1999    TOTAL KNEE ARTHROPLASTY Right 2011     Prior to Admission medications    Medication Sig Start Date End Date Taking? Authorizing Provider   amLODIPine (NORVASC) 10 mg tablet Take 10 mg by mouth daily. Yes Richy Stanley MD   clonazePAM (KLONOPIN) 0.5 mg tablet Take 0.5 mg by mouth nightly as needed. Yes Richy Stanley MD   labetalol (NORMODYNE) 200 mg tablet Take 200 mg by mouth two (2) times a day. Indications: HYPERTENSION   Yes Historical Provider   hydrochlorothiazide (HYDRODIURIL) 25 mg tablet Take 25 mg by mouth daily. Indications: HYPERTENSION   Yes Historical Provider   potassium chloride SR (KLOR-CON 10) 10 mEq tablet Take 20 mEq by mouth. Indications: HYPOKALEMIA PREVENTION   Yes Historical Provider   pravastatin (PRAVACHOL) 40 mg tablet Take 40 mg by mouth nightly. Indications: HYPERCHOLESTEROLEMIA   Yes Historical Provider   clopidogrel (PLAVIX) 75 mg tablet Take 75 mg by mouth daily. Yes Historical Provider   thiamine (VITAMIN B-1) 100 mg tablet Take 100 mg by mouth daily. Yes Historical Provider   vitamin E (AQUA GEMS) 400 unit capsule Take 800 Units by mouth daily. Yes Historical Provider   Cholecalciferol, Vitamin D3, (VITAMIN D3) 1,000 unit cap Take 2 Tabs by mouth daily. Yes Historical Provider   omega-3 fatty acids-vitamin e (FISH OIL) 1,000 mg cap Take 2 Caps by mouth daily. Yes Historical Provider     Allergies   Allergen Reactions    Aleve [Naproxen Sodium] Swelling     Throat swelling      History reviewed. No pertinent family history. SOCIAL HISTORY:  Patient resides at Home  Patient ambulates without assistance.    Smoking history: never  Alcohol history: Denies    Objective:     Physical Exam:   Patient Vitals for the past 8 hrs:   Temp Pulse Resp BP SpO2   04/28/18 1346 - 76 23 156/70 -   04/28/18 1254 - 90 21 - 94 %   04/28/18 1138 - 87 16 - 95 %   04/28/18 1100 - 76 20 160/80 96 %   04/28/18 1000 - 71 - 156/79 95 %   04/28/18 0900 - 76 17 152/66 95 %   04/28/18 0757 97.4 °F (36.3 °C) 77 12 (!) 175/91 95 %         Visit Vitals    BP (!) 184/96 (BP 1 Location: Left arm, BP Patient Position: At rest)    Pulse 74    Temp 98.4 °F (36.9 °C)    Resp 24    Ht 6' (1.829 m)    Wt 87.9 kg (193 lb 12.8 oz)    SpO2 94%    BMI 26.28 kg/m2     General:  Alert, cooperative, no distress, appears stated age. Head:  Normocephalic, without obvious abnormality, atraumatic. Eyes:  Conjunctivae/corneas clear. PERRL, EOMs intact. Ears:  Normal external exam normal hearing. Nose: Nares normal. Septum midline. Mucosa normal. No drainage or sinus tenderness. Throat: Lips, mucosa, and tongue normal. Teeth and gums normal.   Neck: Supple, symmetrical, trachea midline, no adenopathy, thyroid: no enlargement/tenderness/nodules, and no JVD. Back:   Symmetric, no curvature. ROM normal. No CVA tenderness. Lungs:   Clear to auscultation bilaterally. Chest wall:  No tenderness or deformity. Heart:  Regular rate and rhythm, S1, S2 normal   Abdomen:   Soft, LLQ mild tenderness to palpation w/o guarding or rebound. Extremities: Extremities normal, atraumatic, no cyanosis or edema. Pulses: 1+ and symmetric all extremities. Skin: Skin color, texture, turgor normal. No rashes or lesions   Lymph nodes: Cervical, supraclavicular, and axillary nodes normal.   Neurologic: CNII-XII intact. Normal strength. ECG:    Normal sinus rhythm at 77  Right bundle branch block   T wave abnormality, consider lateral ischemia   Abnormal ECG   When compared with ECG of 17-FEB-2014 09:35,   Right bundle branch block is now present     Data Review: All diagnostic labs and studies have been reviewed. Chest x-ray:  Portable AP chest demonstrates an NG tube which is coiled in the distal thoracic  esophagus, and has its tip directed superiorly within the more proximal thoracic  Esophagus.     CT abd/pelvis:  1. Mechanical obstruction of colon and small bowel by incarcerated hernia containing sigmoid colon. Smaller right inguinal hernia containing fluid. 2. Complex mass in the upper abdomen thought to be of pancreatic origin. 3. Multiple pulmonary nodules. 4. Ascites. 5. Cholelithiasis. 6. Prostatic enlargement and heterogeneity. Correlate with physical examination and other clinical and laboratory parameters. Assessment:   Emerald Yang is a 80 y.o. male who presents with incarcerated left inguinal hernia. We are asked to see the patient in consult for pre-op risk stratisfication. 1. Medically stable for surgery. 2. Incarcerated left inguinal hernia requiring surgery. 3. Complex pancreatic mass. 4. Hyperbilirubinemia 1.6 on adm  5. Leukocytosis 14.6 on adm  6. Multiple pulmonary nodules  7. ENMA on CPAP  8. HTN benign  9. Hyperlipidemia      Plan:     Proceed to surgery given the urgency of this situation but despite his age he is otherwise medically stable. Thank you for involving our team in the care of your patient. We will continue to follow along with you if you want us to continue with medical management otherwise we will sign off if you choose.         Signed By: Lulu Singh MD     April 28, 2018

## 2018-04-28 NOTE — PROGRESS NOTES
Pt is connected with [facility] for medical needs. This CM attempted to locate documents for pt to sign and send to [place] to inform pt was at [facility]. This CM was unable to locate documents. Please follow-up. Pt is a 79 y/o  male admitted for Hernia, inguinal, recurrent, with obstruction; Pancreatic mass;inguinal hernia left via [facility] ED. Pt lives with adult son in a two story home with his primary access on main level. Pt has five steps to enter his residence via deck. Pt is independent with ADLs to include driving at baseline. Pt has no previous HH or SNF providers. Pt has access to [facility] and [employer] those needs. Pt prefers to use [facility] for pharmacy but will use [facility] as needed. Pt will be transported at discharge by adult son via private vehicle. CM met with pt to complete initial assessment while adult son, Mr. Tho Rodrigues, was present. Pt is alert and oriented to person, place,time and situation. CM will continue to follow pt to assist with discharge plans as needed. Care Management Interventions  PCP Verified by CM: Yes (Dr. Angie Heimlich with [facility] )  Mode of Transport at Discharge:  Other (see comment) (Private vehicle )  Transition of Care Consult (CM Consult): Discharge Planning  Discharge Durable Medical Equipment: No  Health Maintenance Reviewed: Yes  Physical Therapy Consult: No  Occupational Therapy Consult: No  Speech Therapy Consult: No  Current Support Network: Relative's Home, Own Home (Pt resides with adult son )  Confirm Follow Up Transport: Self  Plan discussed with Pt/Family/Caregiver: Yes  Discharge Location  Discharge Placement:  (TBD )    JANINE Mcfarland, [employer]   [phone]

## 2018-04-28 NOTE — ED NOTES
Attempted to call report on patient. RN currently getting report on another patient and will call back. Charge RN called echo to check on status of patient's echo.

## 2018-04-28 NOTE — ED PROVIDER NOTES
EMERGENCY DEPARTMENT HISTORY AND PHYSICAL EXAM      Date: 4/28/2018  Patient Name: Edgard Watters    History of Presenting Illness     Chief Complaint   Patient presents with    Chest Pain       History Provided By: Patient    HPI: Edgard Watters, 80 y.o. male with PMHx significant for HTN, GERD, and high cholesterol, presents ambulatory to the ED with cc of constant, mild to moderate, diffuse ABD pain with associated distention x a week. He also reports a burning sensation in his throat. Pt notes his pain feels similar to indigestion, stating \"I feel like if I burped it would help. \" He reports last BM was 4 days ago and he has been passing minimal flatulence. Pt notes that this morning he had an episode of chest tightness and SOB shortly after waking up. Son states pt currently has a hernia. Pt denies any hx of diverticulitis. He denies hx of ABD surgeries. He denies coronary stent placement and notes he was scheduled for an outpatient stress test yesterday but was unable to go secondary to symptoms. Pt specifically denies any NV, cough, fever, chills, blood in stool, or urinary symptoms. Pt defers nausea and pain medications at this time. There are no other complaints, changes, or physical findings at this time.     PCP: PROVIDER UNKNOWN    Current Facility-Administered Medications   Medication Dose Route Frequency Provider Last Rate Last Dose    sodium chloride (NS) flush 5-10 mL  5-10 mL IntraVENous Q8H Marisela Mead MD        sodium chloride (NS) flush 5-10 mL  5-10 mL IntraVENous PRN Marisela Mead MD        0.9% sodium chloride infusion  100 mL/hr IntraVENous CONTINUOUS Marisela Mead MD        piperacillin-tazobactam (ZOSYN) 3.375 g in 0.9% sodium chloride (MBP/ADV) 100 mL  3.375 g IntraVENous Q6H Marisela Mead MD        ondansetron [facility]) injection 4 mg  4 mg IntraVENous Q4H PRN Marisela Mead MD         Current Outpatient Prescriptions   Medication Sig Dispense Refill    amLODIPine (NORVASC) 10 mg tablet Take 10 mg by mouth daily.  clonazePAM (KLONOPIN) 0.5 mg tablet Take 0.5 mg by mouth nightly as needed.  labetalol (NORMODYNE) 200 mg tablet Take 200 mg by mouth two (2) times a day. Indications: HYPERTENSION      hydrochlorothiazide (HYDRODIURIL) 25 mg tablet Take 25 mg by mouth daily. Indications: HYPERTENSION      potassium chloride SR (KLOR-CON 10) 10 mEq tablet Take 20 mEq by mouth. Indications: HYPOKALEMIA PREVENTION      pravastatin (PRAVACHOL) 40 mg tablet Take 40 mg by mouth nightly. Indications: HYPERCHOLESTEROLEMIA      clopidogrel (PLAVIX) 75 mg tablet Take 75 mg by mouth daily.  thiamine (VITAMIN B-1) 100 mg tablet Take 100 mg by mouth daily.  vitamin E (AQUA GEMS) 400 unit capsule Take 800 Units by mouth daily.  Cholecalciferol, Vitamin D3, (VITAMIN D3) 1,000 unit cap Take 2 Tabs by mouth daily.  omega-3 fatty acids-vitamin e (FISH OIL) 1,000 mg cap Take 2 Caps by mouth daily. Past History     Past Medical History:  Past Medical History:   Diagnosis Date    Arthritis     GERD (gastroesophageal reflux disease)     High cholesterol     Hypertension     Other ill-defined conditions(799.89) ~2000    arm tingling to ER, per pt states was not a stroke, but has been on plavix and aggrenox since. currently on plavix       Past Surgical History:  Past Surgical History:   Procedure Laterality Date    HX CATARACT REMOVAL Bilateral 2008 and 2012    HX HERNIA REPAIR Left     HX ORTHOPAEDIC Right     elbow     TOTAL KNEE ARTHROPLASTY Left 1999    TOTAL KNEE ARTHROPLASTY Right 2011       Family History:  History reviewed. No pertinent family history. Social History:  Social History   Substance Use Topics    Smoking status: Never Smoker    Smokeless tobacco: None    Alcohol use No       Allergies:   Allergies   Allergen Reactions    Aleve [Naproxen Sodium] Swelling     Throat swelling         Review of Systems   Review of Systems   Constitutional: Negative for chills, fatigue and fever. HENT: Negative for congestion, rhinorrhea and sore throat. Eyes: Negative for pain, discharge and visual disturbance. Respiratory: Positive for chest tightness and shortness of breath. Negative for cough and wheezing. Cardiovascular: Negative for chest pain, palpitations and leg swelling. Gastrointestinal: Positive for abdominal distention, abdominal pain and constipation. Negative for blood in stool, diarrhea, nausea and vomiting. Genitourinary: Negative for dysuria, frequency and hematuria. Musculoskeletal: Negative for arthralgias, back pain and myalgias. Skin: Negative for rash. Neurological: Negative for dizziness, weakness, light-headedness and headaches. Psychiatric/Behavioral: Negative. All other systems reviewed and are negative. Physical Exam   Physical Exam   Constitutional: He is oriented to person, place, and time. He appears well-developed and well-nourished. No distress. HENT:   Head: Normocephalic and atraumatic. Eyes: EOM are normal. Right eye exhibits no discharge. Left eye exhibits no discharge. No scleral icterus. Neck: Normal range of motion. Neck supple. No tracheal deviation present. Cardiovascular: Normal rate, regular rhythm, normal heart sounds and intact distal pulses. Exam reveals no gallop and no friction rub. No murmur heard. Pulmonary/Chest: Effort normal and breath sounds normal. No respiratory distress. He has no wheezes. He has no rales. Abdominal: Soft. He exhibits distension. There is generalized tenderness. There is guarding. There is no rebound. Musculoskeletal: Normal range of motion. He exhibits no edema. Lymphadenopathy:     He has no cervical adenopathy. Neurological: He is alert and oriented to person, place, and time. Skin: Skin is warm and dry. No rash noted. Psychiatric: He has a normal mood and affect.    Nursing note and vitals reviewed. Diagnostic Study Results     Labs -     Recent Results (from the past 12 hour(s))   EKG, 12 LEAD, INITIAL    Collection Time: 04/28/18  7:53 AM   Result Value Ref Range    Ventricular Rate 77 BPM    Atrial Rate 77 BPM    P-R Interval 148 ms    QRS Duration 128 ms    Q-T Interval 424 ms    QTC Calculation (Bezet) 479 ms    Calculated P Axis -11 degrees    Calculated R Axis -121 degrees    Calculated T Axis 39 degrees    Diagnosis       Normal sinus rhythm  Right bundle branch block  T wave abnormality, consider lateral ischemia  Abnormal ECG  When compared with ECG of 17-FEB-2014 09:35,  Right bundle branch block is now present     CBC WITH AUTOMATED DIFF    Collection Time: 04/28/18  8:34 AM   Result Value Ref Range    WBC 14.6 (H) 4.1 - 11.1 K/uL    RBC 4.29 4. 10 - 5.70 M/uL    HGB 15.2 12.1 - 17.0 g/dL    HCT 42.5 36.6 - 50.3 %    MCV 99.1 (H) 80.0 - 99.0 FL    MCH 35.4 (H) 26.0 - 34.0 PG    MCHC 35.8 30.0 - 36.5 g/dL    RDW 12.2 11.5 - 14.5 %    PLATELET 716 295 - 347 K/uL    MPV 9.9 8.9 - 12.9 FL    NRBC 0.0 0  WBC    ABSOLUTE NRBC 0.00 0.00 - 0.01 K/uL    NEUTROPHILS 89 (H) 32 - 75 %    LYMPHOCYTES 5 (L) 12 - 49 %    MONOCYTES 6 5 - 13 %    EOSINOPHILS 0 0 - 7 %    BASOPHILS 0 0 - 1 %    IMMATURE GRANULOCYTES 0 0.0 - 0.5 %    ABS. NEUTROPHILS 13.0 (H) 1.8 - 8.0 K/UL    ABS. LYMPHOCYTES 0.7 (L) 0.8 - 3.5 K/UL    ABS. MONOCYTES 0.9 0.0 - 1.0 K/UL    ABS. EOSINOPHILS 0.0 0.0 - 0.4 K/UL    ABS. BASOPHILS 0.0 0.0 - 0.1 K/UL    ABS. IMM.  GRANS. 0.0 0.00 - 0.04 K/UL    DF SMEAR SCANNED      RBC COMMENTS NORMOCYTIC, NORMOCHROMIC     METABOLIC PANEL, COMPREHENSIVE    Collection Time: 04/28/18  8:34 AM   Result Value Ref Range    Sodium 133 (L) 136 - 145 mmol/L    Potassium 3.9 3.5 - 5.1 mmol/L    Chloride 97 97 - 108 mmol/L    CO2 28 21 - 32 mmol/L    Anion gap 8 5 - 15 mmol/L    Glucose 190 (H) 65 - 100 mg/dL    BUN 28 (H) 6 - 20 MG/DL    Creatinine 1.24 0.70 - 1.30 MG/DL    BUN/Creatinine ratio 23 (H) 12 - 20      GFR est AA >60 >60 ml/min/1.73m2    GFR est non-AA 55 (L) >60 ml/min/1.73m2    Calcium 9.6 8.5 - 10.1 MG/DL    Bilirubin, total 1.6 (H) 0.2 - 1.0 MG/DL    ALT (SGPT) 17 12 - 78 U/L    AST (SGOT) 15 15 - 37 U/L    Alk.  phosphatase 76 45 - 117 U/L    Protein, total 7.0 6.4 - 8.2 g/dL    Albumin 3.5 3.5 - 5.0 g/dL    Globulin 3.5 2.0 - 4.0 g/dL    A-G Ratio 1.0 (L) 1.1 - 2.2     LACTIC ACID    Collection Time: 04/28/18  8:34 AM   Result Value Ref Range    Lactic acid 1.7 0.4 - 2.0 MMOL/L   LIPASE    Collection Time: 04/28/18  8:34 AM   Result Value Ref Range    Lipase 50 (L) 73 - 393 U/L   MAGNESIUM    Collection Time: 04/28/18  8:34 AM   Result Value Ref Range    Magnesium 1.6 1.6 - 2.4 mg/dL   TROPONIN I    Collection Time: 04/28/18  8:34 AM   Result Value Ref Range    Troponin-I, Qt. <0.04 <0.05 ng/mL   CK W/ CKMB & INDEX    Collection Time: 04/28/18  8:34 AM   Result Value Ref Range    CK 32 (L) 39 - 308 U/L    CK - MB 2.0 <3.6 NG/ML    CK-MB Index 6.3 (H) 0 - 2.5     URINALYSIS W/ REFLEX CULTURE    Collection Time: 04/28/18 11:08 AM   Result Value Ref Range    Color DARK YELLOW      Appearance CLEAR CLEAR      Specific gravity 1.025 1.003 - 1.030      pH (UA) 5.5 5.0 - 8.0      Protein 30 (A) NEG mg/dL    Glucose NEGATIVE  NEG mg/dL    Ketone TRACE (A) NEG mg/dL    Blood NEGATIVE  NEG      Urobilinogen 1.0 0.2 - 1.0 EU/dL    Nitrites NEGATIVE  NEG      Leukocyte Esterase NEGATIVE  NEG      UA:UC IF INDICATED CULTURE NOT INDICATED BY UA RESULT CNI      WBC 0-4 0 - 4 /hpf    RBC 0-5 0 - 5 /hpf    Epithelial cells FEW FEW /lpf    Bacteria NEGATIVE  NEG /hpf    Hyaline cast 0-2 0 - 5 /lpf   BILIRUBIN, CONFIRM    Collection Time: 04/28/18 11:08 AM   Result Value Ref Range    Bilirubin UA, confirm NEGATIVE  NEG         Radiologic Studies -   XR ABD (KUB)   Final Result      CT ABD PELV W CONT   Final Result      XR CHEST PORT    (Results Pending)     CT Results  (Last 48 hours)               04/28/18 1027  CT ABD PELV W CONT Final result    Impression:  IMPRESSION:       1. Mechanical obstruction of colon and small bowel by incarcerated hernia   containing sigmoid colon. Smaller right inguinal hernia containing fluid. 2. Complex mass in the upper abdomen thought to be of pancreatic origin. 3. Multiple pulmonary nodules. 4. Ascites. 5. Cholelithiasis. 6. Prostatic enlargement and heterogeneity. Correlate with physical examination   and other clinical and laboratory parameters. Narrative:  EXAM:  CT ABD PELV W CONT       INDICATION: Abdominal pain; ab pain x four days with constipation  , nausea and   vomiting. History of hernia repair on the left. COMPARISON: None. CONTRAST:  None. mL of Isovue-370. TECHNIQUE:    Following the uneventful intravenous administration of contrast, thin axial   images were obtained through the abdomen and pelvis. Coronal and sagittal   reconstructions were generated. Oral contrast was not administered. CT dose   reduction was achieved through use of a standardized protocol tailored for this   examination and automatic exposure control for dose modulation. FINDINGS:    LUNG BASES: Numerous nodules and masses. Small atelectasis. INCIDENTALLY IMAGED HEART AND MEDIASTINUM: Unremarkable. LIVER: No mass or biliary dilatation. GALLBLADDER: Cholelithiasis without inflammatory change. SPLEEN: No mass. PANCREAS: Heterogeneously enhancing complex mass in the pancreatic tail and   body, measuring 6.7 x 4.8 x 4.6 cm, containing calcification without obvious   associated ductal dilatation. While the fat plane between this mass and the   gastric wall is obliterated, this mass is not felt to arise from stomach. ADRENALS: Unremarkable. KIDNEYS: No mass, calculus, or hydronephrosis. STOMACH: Distended with fluid. SMALL BOWEL: Mildly to moderately distended.    COLON: Distended with fluid throughout the right colon, transverse colon and   left colon to the level of incarceration of sigmoid colon in a left inguinal   hernia. Numerous diverticula in the sigmoid colon without associated acute   inflammatory change at this time. APPENDIX: Not well seen. PERITONEUM: No pneumoperitoneum. Ascites. RETROPERITONEUM: No lymphadenopathy or aortic aneurysm. Heavy aortic   calcification. REPRODUCTIVE ORGANS: Prostatic enlargement and heterogeneous enhancement. URINARY BLADDER: No mass or calculus. BONES: No destructive bone lesion. ADDITIONAL COMMENTS: Large left inguinal hernia containing fluid and sigmoid   colon. Small 2 moderate right inguinal hernia containing fluid only. CXR Results  (Last 48 hours)    None            Medical Decision Making   I am the first provider for this patient. I reviewed the vital signs, available nursing notes, past medical history, past surgical history, family history and social history. Vital Signs-Reviewed the patient's vital signs. Patient Vitals for the past 12 hrs:   Temp Pulse Resp BP SpO2   04/28/18 1100 - 76 20 160/80 96 %   04/28/18 1000 - 71 - 156/79 95 %   04/28/18 0900 - 76 17 152/66 95 %   04/28/18 0757 97.4 °F (36.3 °C) 77 12 (!) 175/91 95 %       Pulse Oximetry Analysis - 99% on RA    Cardiac Monitor:   Rate: 77 bpm  Rhythm: Normal Sinus Rhythm      EKG interpretation: (Preliminary) 07:53  Rhythm: normal sinus rhythm; and regular . Rate (approx.): 77; Axis: normal; AK interval: normal; QRS interval: prolonged; ST/T wave: T wave inversions in lateral leads; Other findings: R BBB.   Written by JU Love, as dictated by Robbie Case MD.    Records Reviewed: Nursing Notes, Old Medical Records, Previous electrocardiograms, Previous Radiology Studies and Previous Laboratory Studies    Provider Notes (Medical Decision Making):   DDx: Obstruction, ileus, strangulated/incarcerated hernia, ischemia, appendicitis, diverticulitis, perforation, cholecystitis, pancreatitis, stable angina, unstable angina, ACS, PE, PNA, PTX    Disposition: Patient presents to ED with abdominal pain and abdominal distention. CT a/p consistent with incarcerated inguinal hernia. NGT placed. Acute abdominal process, abdominal distention likely contributing to chest pain and dyspnea. Troponin negative. General surgery consulted and will plan on surgery following cardiology consult. Admit for further management. ED Course:   Initial assessment performed. The patients presenting problems have been discussed, and they are in agreement with the care plan formulated and outlined with them. I have encouraged them to ask questions as they arise throughout their visit. 10:57 AM  Pt and family updated on imaging results. Written by Fermín Banks ED Scribe, as dictated by Denita Silva MD.    Consult Note:  10:58 Cooper Godinez MD spoke with Dr. Noah Ferrara  Specialty: General Surgery  Discussed pts hx, disposition, and available diagnostic and imaging results. Reviewed care plans. Consultant agrees with plans as outlined. Recommends placing NG tube and he will come evaluate in ED.     12:12 PM  Dr. Noah Ferrara at bedside. Written by Fermín Banks ED Scribe, as dictated by Denita Silva MD.       CRITICAL CARE NOTE :    1:05 PM    IMPENDING DETERIORATION - GI  ASSOCIATED RISK FACTORS - Incarcerated hernia with obstruction  MANAGEMENT- Bedside assessment, supervision of care  INTERPRETATION -  Labs, EKG, CT  INTERVENTIONS - IVF, general surgery consult with plan for OR  CASE REVIEW - Specialist, nursing, family  TREATMENT RESPONSE - Unchanged  PERFORMED BY - Self    NOTES:  I have spent 45 minutes of critical care time involved in lab review, consultations with specialist, family decision- making, bedside attention and documentation. During this entire length of time I was immediately available to the patient .       Disposition:  12:17 PM  Patient is being admitted to general surgery. The results of their tests and reasons for their admission have been discussed with them and/or available family. They convey agreement and understanding for the need to be admitted and for their admission diagnosis. Consultation has been made with the inpatient physician specialist for hospitalization. PLAN:  1. Admit to general surgery. Return to ED if worse     Diagnosis     Clinical Impression:   1. Incarcerated hernia    2. Hernia, inguinal, recurrent, with obstruction    3. Pancreatic mass            Attestations: This note is prepared by Arianna Toro, acting as Scribe for Niecy Florez MD.    Niecy Florez MD: The scribe's documentation has been prepared under my direction and personally reviewed by me in its entirety. I confirm that the note above accurately reflects all work, treatment, procedures, and medical decision making performed by me.

## 2018-04-28 NOTE — PROGRESS NOTES
TRANSFER - IN REPORT:    Verbal report received from [facility] (name) on Thien Band  being received from ED(unit) for routine progression of care      Report consisted of patients Situation, Background, Assessment and   Recommendations(SBAR). Information from the following report(s) SBAR, Kardex, Intake/Output, MAR, Recent Results and Cardiac Rhythm SR/BBB was reviewed with the receiving nurse. Opportunity for questions and clarification was provided.

## 2018-04-28 NOTE — BRIEF OP NOTE
BRIEF OPERATIVE NOTE    Date of Procedure: 4/28/2018   Preoperative Diagnosis:recurrent incarcerated left inguinal hernia with obstruction  Postoperative Diagnosis: same, mass involving sigmoid colon   Procedure(s):  REDUCED AND REPAIRED LEFT INGUINAL HERNIA,WITH SIGMOID RESECTION ,END COLOSTOMY  Surgeon(s) and Role:     * Raquel Sanchez MD - Primary         Surgical Assistant: none    Surgical Staff:  Circ-1: Norris Mendieta RN  Circ-Relief: Anca Maradiaga RN  Scrub Tech-1: Kimi Rolle  Scrub Tech-Relief: Ata Smart  Surg Asst-1: [place] Eaton  Surg Asst-Relief: [place] Fernando  Event Time In   Incision Start 1717   Incision Close 1919     Anesthesia: General   Estimated Blood Loss:100 cc  Specimens:   ID Type Source Tests Collected by Time Destination   1 : scrotal mass Preservative Scrotum  Raquel Sanchez MD 4/28/2018 1736 Pathology   2 : incarcerated sigmoid colon Preservative Sigmoid  Raquel Sanchez MD 4/28/2018 1808 Pathology   3 : left inguinal lymph node Preservative Inguinal  Raquel Sanchez MD 4/28/2018 1809 Pathology   4 : proximal margin sigmoid colon Preservative Sigmoid  Raquel Sanchez MD 4/28/2018 1900 Pathology      Findings: lih incarcerated sigmoid colon, mass involving colon,sigmoid resection, end colostomy  Complications: unexpected mass involving incarcerated sigmoid colon  Implants:   Implant Name Type Inv. Item Serial No.  Lot No. LRB No. Used Action   [employer] LG 1.6X1. 9IN --  - SNA   [employer] LG 1.6X1. Darrelyn Andreas SEVERO [id number] Left 1 Implanted       [address]

## 2018-04-28 NOTE — ED NOTES
MD Yessenia sandra. MD Fernando on call with [facility] and will call back in regards to cardiac clearance for surgery.

## 2018-04-28 NOTE — ED NOTES
TRANSFER - OUT REPORT:    Verbal report given to Alferd Runner, RN (name) on Thera Manifold  being transferred to Gen Surg (unit) for routine progression of care       Report consisted of patients Situation, Background, Assessment and   Recommendations(SBAR). Information from the following report(s) SBAR, Kardex, ED Summary, Intake/Output, MAR, Recent Results, Med Rec Status and Cardiac Rhythm Sinus with BBB was reviewed with the receiving nurse. Lines:   Peripheral IV 04/28/18 Right Antecubital (Active)   Site Assessment Clean, dry, & intact 4/28/2018  8:51 AM   Phlebitis Assessment 0 4/28/2018  8:51 AM   Infiltration Assessment 0 4/28/2018  8:51 AM   Dressing Status Clean, dry, & intact 4/28/2018  8:51 AM   Dressing Type Tape;Transparent 4/28/2018  8:51 AM   Hub Color/Line Status Pink;Flushed 4/28/2018  8:51 AM   Action Taken Blood drawn 4/28/2018  8:51 AM        Opportunity for questions and clarification was provided.       Patient transported with:   [facility]

## 2018-04-28 NOTE — ED NOTES
Patient began burping while RN medicating him. Son left out of room approximately 5 minutes later and states \"He is throwing up. \" MD Ding aware and will order aurelia.

## 2018-04-28 NOTE — IP AVS SNAPSHOT
[facility] 
[phone] Patient: Josie Espinoza MRN: [id number] ZIX:[id number] About your hospitalization You were admitted on:  April 28, 2018 You last received care in the:  [facility] 2 PROGRESSIVE CARE You were discharged on: May 4, 2018 Why you were hospitalized Your primary diagnosis was:  Hernia, Inguinal, Recurrent, With Obstruction Your diagnoses also included:  Pancreatic Mass, Ecg Abnormality, Htn (Hypertension), Primary Pancreatic Cancer With Metastasis To Other Site (Hcc) Follow-up Information Follow up With Details Comments Contact Info Elissa Kline MD  Cardiology - follow up 1-2 weeks [address] [place] [address] 
[phone] Arben Reynoso MD  Gen/surg - follow up 1-2 weeks 305 [facility] 
[phone] Anish Richard MD  GI - follow up 1-2 weeks 200 [facility] [address] [facility] 2 [facility] 
[phone] Hilda Luna MD  Hematology - Oncology - follow up  200 [facility] [address] [facility] 
[phone] Dr. Lisa Mercado  PCP - please schedule follow up 1-2 weeks after you are discharged from [address] [place] [address] [phone] Damon Francia [address] [facility] [address]   [address] [address] [phone] Discharge Orders None A check kari indicates which time of day the medication should be taken. My Medications START taking these medications Instructions Each Dose to Equal  
 Morning Noon Evening Bedtime  
 tamsulosin 0.4 mg capsule Commonly known as:  FLOMAX Your last dose was: Your next dose is: Take 1 Cap by mouth daily. 0.4 mg  
    
   
   
   
  
  
CONTINUE taking these medications Instructions Each Dose to Equal  
 Morning Noon Evening Bedtime amLODIPine 10 mg tablet Commonly known as:  Lesli Vogt Your last dose was: Your next dose is: Take 1 Tab by mouth daily. 10 mg  
    
   
   
   
  
 hydroCHLOROthiazide 25 mg tablet Commonly known as:  HYDRODIURIL Your last dose was: Your next dose is: Take 1 Tab by mouth daily. Indications: hypertension 25 mg  
    
   
   
   
  
 labetalol 200 mg tablet Commonly known as:  Fifi Morrisoner Your last dose was: Your next dose is: Take 1 Tab by mouth two (2) times a day. Indications: hypertension 200 mg  
    
   
   
   
  
 pravastatin 40 mg tablet Commonly known as:  PRAVACHOL Your last dose was: Your next dose is: Take 40 mg by mouth nightly. Indications: HYPERCHOLESTEROLEMIA 40 mg  
    
   
   
   
  
 VITAMIN B-1 100 mg tablet Generic drug:  thiamine Your last dose was: Your next dose is: Take 100 mg by mouth daily. 100 mg  
    
   
   
   
  
 VITAMIN D3 1,000 unit Cap Generic drug:  cholecalciferol Your last dose was: Your next dose is: Take 2 Tabs by mouth daily. 2 Tab  
    
   
   
   
  
 vitamin E 400 unit capsule Commonly known as:  [address] Your last dose was: Your next dose is: Take 800 Units by mouth daily. 800 Units STOP taking these medications   
 clonazePAM 0.5 mg tablet Commonly known as:  KlonoPIN  
   
  
 clopidogrel 75 mg Tab Commonly known as:  PLAVIX FISH OIL 1,000 mg Cap Generic drug:  omega-3 fatty acids-vitamin e  
   
  
 potassium chloride SR 10 mEq tablet Commonly known as:  KLOR-CON 10 Where to Get Your Medications Information on where to get these meds will be given to you by the nurse or doctor. ! Ask your nurse or doctor about these medications  
  amLODIPine 10 mg tablet hydroCHLOROthiazide 25 mg tablet  
 labetalol 200 mg tablet  
 tamsulosin 0.4 mg capsule Discharge Instructions Open Bowel Resection: What to Expect at Home Your Recovery You are likely to have pain that comes and goes for the next few days after bowel surgery. You may have bowel cramps, and your cut (incision) may hurt. You may also feel like you have the flu. You may have a low fever and feel tired and nauseated. This is common. You should feel better after a week and will probably be back to normal in 2 to 3 weeks. This care sheet gives you a general idea about how long it will take for you to recover. But each person recovers at a different pace. Follow the steps below to get better as quickly as possible. How can you care for yourself at home? Activity · Rest when you feel tired. Getting enough sleep will help you recover. · Try to walk each day. Start by walking a little more than you did the day before. Bit by bit, increase the amount you walk. Walking boosts blood flow and helps prevent pneumonia and constipation. · Avoid strenuous activities, such as biking, jogging, weight lifting, or aerobic exercise, until your doctor says it is okay. · Ask your doctor when you can drive again. · You will probably need to take 3 to 4 weeks off from work. It depends on the type of work you do and how you feel. You may need to take off 4 to 6 weeks if you lift heavy objects in your job. · You may shower 24 to 48 hours after surgery, if your doctor says it is okay. Pat the cut (incision) dry. Do not take a bath for the first 2 weeks, or until your doctor tells you it is okay. · Ask your doctor when it is okay for you to have sex. Diet · You may not have much appetite after the surgery. But try to eat a healthy diet. Your doctor will tell you about any foods you should not eat. · Eat a low-fiber diet for several weeks after surgery.  Eat many small meals throughout the day. Add high-fiber foods a little at a time. · Eat yogurt. It puts good bacteria into your colon and helps prevent diarrhea. · Try to avoid nuts, seeds, and corn for a while. They may be hard to digest. 
· You may need to take vitamins that contain sodium and potassium. Ask your doctor. · Drink plenty of fluids to avoid becoming dehydrated. Medicines · Your doctor will tell you if and when you can restart your medicines. He or she will also give you instructions about taking any new medicines. · If you take blood thinners, such as warfarin (Coumadin), clopidogrel (Plavix), or aspirin, be sure to talk to your doctor. He or she will tell you if and when to start taking those medicines again. Make sure that you understand exactly what your doctor wants you to do. · Take pain medicines exactly as directed. ¨ If the doctor gave you a prescription medicine for pain, take it as prescribed. ¨ If you are not taking a prescription pain medicine, ask your doctor if you can take an over-the-counter medicine. ¨ Do not take two or more pain medicines at the same time unless the doctor told you to. Many pain medicines have acetaminophen, which is Tylenol. Too much acetaminophen (Tylenol) can be harmful. · If you think your pain medicine is making you sick to your stomach: 
¨ Take your medicine after meals (unless your doctor tells you not to). ¨ Ask your doctor for a different pain medicine. · If your doctor prescribed antibiotics, take them as directed. Do not stop taking them just because you feel better. You need to take the full course of antibiotics. · You may need to take some medicines in a different form. You will be told whether to crush pills or take a liquid form of the medicine. · If your doctor gives you a stool softener, take it as directed. Incision care · If you have strips of tape on the incision, leave the tape on for a week or until it falls off. · Wash the area daily with warm, soapy water, and pat it dry. Follow-up care is a key part of your treatment and safety. Be sure to make and go to all appointments, and call your doctor if you are having problems. It's also a good idea to know your test results and keep a list of the medicines you take. When should you call for help? Call 911 anytime you think you may need emergency care. For example, call if: 
· You passed out (lost consciousness). · You have sudden chest pain and shortness of breath, or you cough up blood. · You have severe pain in your belly. Call your doctor now or seek immediate medical care if: 
· You are sick to your stomach and cannot drink fluids or keep them down. · You have signs of a blood clot, such as: 
¨ Pain in your calf, back of the knee, thigh, or groin. ¨ Redness and swelling in your leg or groin. · You have a lot of diarrhea that smells very bad. · You have trouble passing urine or stool, especially if you have mild pain or swelling in your lower belly. · You have signs of infection, such as: 
¨ Increased pain, swelling, warmth, or redness. ¨ Red streaks leading from the incision. ¨ Pus draining from the incision. ¨ A fever. · You have pain that does not get better after you take pain medicine. · You have loose stitches, or your incision comes open. · You are bleeding or have new drainage from the incision. Watch closely for any changes in your health, and be sure to contact your doctor if: 
· You do not have a bowel movement after taking a laxative. · You do not get better as expected. Where can you learn more? Go to http://moises-aj.info/. Enter 031 7626 in the search box to learn more about \"Open Bowel Resection: What to Expect at Home. \" Current as of: August 9, 2016 Content Version: 11.3 © 5914-3912 [employer].  Care instructions adapted under license by [employer] (which disclaims liability or warranty for this information). If you have questions about a medical condition or this instruction, always ask your healthcare professional. [address] any warranty or liability for your use of this information. Ring Announcement We are excited to announce that we are making your provider's discharge notes available to you in Ring. You will see these notes when they are completed and signed by the physician that discharged you from your recent hospital stay. If you have any questions or concerns about any information you see in Ring, please call the Health Information Department where you were seen or reach out to your Primary Care Provider for more information about your plan of care. Introducing [facility]! [employer] introduces Ring patient portal. Now you can access parts of your medical record, email your doctor's office, and request medication refills online. 1. In your internet browser, go to [email or web address] 2. Click on the First Time User? Click Here link in the Sign In box. You will see the New Member Sign Up page. 3. Enter your Ring Access Code exactly as it appears below. You will not need to use this code after youve completed the sign-up process. If you do not sign up before the expiration date, you must request a new code. · Ring Access Code: [email or web address] Expires: 7/27/2018  8:23 AM 
 
4. Enter the last four digits of your Social Security Number (xxxx) and Date of Birth (mm/dd/yyyy) as indicated and click Submit. You will be taken to the next sign-up page. 5. Create a [employer] ID. This will be your Ring login ID and cannot be changed, so think of one that is secure and easy to remember. 6. Create a Ring password. You can change your password at any time. 7. Enter your Password Reset Question and Answer. This can be used at a later time if you forget your password. 8. Enter your e-mail address. You will receive e-mail notification when new information is available in [address] 9. Click Sign Up. You can now view and download portions of your medical record. 10. Click the Download Summary menu link to download a portable copy of your medical information. If you have questions, please visit the Frequently Asked Questions section of the [employer] website. Remember, [employer] is NOT to be used for urgent needs. For medical emergencies, dial 911. Now available from your iPhone and Android! Introducing Rob Zepeda As a [employer] patient, I wanted to make you aware of our electronic visit tool called Rob Zepeda. [employer] 24/7 allows you to connect within minutes with a medical provider 24 hours a day, seven days a week via a mobile device or tablet or logging into a secure website from your computer. You can access Rob Zepeda from anywhere in the [place]. A virtual visit might be right for you when you have a simple condition and feel like you just dont want to get out of bed, or cant get away from work for an appointment, when your regular [employer] provider is not available (evenings, weekends or holidays), or when youre out of town and need minor care. Electronic visits cost only $49 and if the [employer] 24/7 provider determines a prescription is needed to treat your condition, one can be electronically transmitted to a nearby pharmacy*. Please take a moment to enroll today if you have not already done so. The enrollment process is free and takes just a few minutes. To enroll, please download the [employer] 24/7 makenna to your tablet or phone, or visit [email or web address] to enroll on your computer.    
And, as an [address] [place] [address] patient with a [employer] account, the results of your visits will be scanned into your electronic medical record and your primary care provider will be able to view the scanned results. We urge you to continue to see your regular [facility] provider for your ongoing medical care. And while your primary care provider may not be the one available when you seek a Rob Scanlonfin virtual visit, the peace of mind you get from getting a real diagnosis real time can be priceless. For more information on Vayablelesliefin, view our Frequently Asked Questions (FAQs) at [email or web address]. Sincerely, 
 
Jose Martin Tang MD 
Chief Medical Officer Guillermo Soria *:  certain medications cannot be prescribed via Vayablelesliefin Providers Seen During Your Hospitalization Provider Specialty Primary office phone Sonja Salmeron MD Emergency Medicine [phone] Karie Owen, [address] [place] [address] Surgery [phone] Your Primary Care Physician (PCP) Primary Care Physician Office Phone Office Fax UNKNOWN, PROVIDER ** None ** ** None ** You are allergic to the following Allergen Reactions Aleve (Naproxen Sodium) Swelling Throat swelling Recent Documentation Height Weight BMI Smoking Status 1.829 m 87.9 kg 26.28 kg/m2 Never Smoker Emergency Contacts Name Discharge Info Relation Home Work Mobile Harmeet Aldana DISCHARGE CAREGIVER [3] Child [2] [phone] Patient Belongings The following personal items are in your possession at time of discharge: 
  Dental Appliances: None  Visual Aid: Glasses   Hearing Aids/Status: At bedside  Home Medications: None   Jewelry: None  Clothing: Other (comment)    Other Valuables: None Please provide this summary of care documentation to your next provider. Signatures-by signing, you are acknowledging that this After Visit Summary has been reviewed with you and you have received a copy. Patient Signature:  ____________________________________________________________ Date:  ____________________________________________________________  
  
Edrie Gunnels Provider Signature:  ____________________________________________________________ Date:  ____________________________________________________________

## 2018-04-28 NOTE — ED NOTES
TRANSFER - OUT REPORT:    Verbal report given to Manjit Reyes RN (name) on Venessa Kincaid  being transferred to Patient holding, (unit) for ordered procedure       Report consisted of patients Situation, Background, Assessment and   Recommendations(SBAR). Information from the following report(s) SBAR, Kardex, ED Summary, MAR, Recent Results, Med Rec Status and Cardiac Rhythm NSR was reviewed with the receiving nurse. Lines:   Peripheral IV 04/28/18 Right Antecubital (Active)   Site Assessment Clean, dry, & intact 4/28/2018  8:51 AM   Phlebitis Assessment 0 4/28/2018  8:51 AM   Infiltration Assessment 0 4/28/2018  8:51 AM   Dressing Status Clean, dry, & intact 4/28/2018  8:51 AM   Dressing Type Tape;Transparent 4/28/2018  8:51 AM   Hub Color/Line Status Pink;Flushed 4/28/2018  8:51 AM   Action Taken Blood drawn 4/28/2018  8:51 AM        Opportunity for questions and clarification was provided. Patient transported with:   Registered Nurse       **Patient's clothing and shoes placed in patient belonging bag. Patient's gold colored ring removed and placed in the care of his son (by patient). No further complaints noted.

## 2018-04-28 NOTE — PERIOP NOTES
Handoff Report from Operating Room to PACU    Report received from Rebecca Guerin RN and Umberto Arellano CRNA regarding Chloe Vazquez. Surgeon(s):  Lewis Morris MD  And Procedure(s) (LRB):  REDUCED AND REPAIRED LEFT INGUINAL HERNIA WITH SIGMOID RESECTION END COLOSTOMY (Left)  confirmed   with allergies, drains and dressings discussed. Anesthesia type, drugs, patient history, complications, estimated blood loss, vital signs, intake and output, and last pain medication, lines, reversal medications and temperature were reviewed.

## 2018-04-28 NOTE — ED NOTES
CT at bedside to take patient. Patient refusing CT until he gets pain medicine. MD Ding aware that patient wants something for pain. Patient stood to provide urine sample and was unable to. Will try again later.

## 2018-04-28 NOTE — ED NOTES
Patient presents to the ED with his son. Patient states he has been having abdominal pain/gas since Wednesday. He states he has been unable to pass gas but has been burping a lot. This morning, he began to have an episode of chest pain and SOB. Patient had a stress test scheduled for yesterday and had to reschedule because he was not feeling well. MD Cate Carrion is his cardiologist    Patient on the monitor x3, call bell within reach. Side rails x1. Son at bedside.

## 2018-04-28 NOTE — PROGRESS NOTES
TRANSFER - OUT REPORT:    Verbal report given to Vibha Merrill RN on Rupa Joaquin  being transferred to Progressive Care Unit for change in patient condition(patient needs to be more closely monitored)       Report consisted of patients Situation, Background, Assessment and   Recommendations(SBAR). Information from the following report(s) SBAR, Kardex, Procedure Summary, Intake/Output, MAR, Accordion and Cardiac Rhythm sinus rhythm with occassional PVCs was reviewed with the receiving nurse. Opportunity for questions and clarification was provided.       Patient transported with:   Monitor  O2 @ 4 liters  Registered Nurse   Patient chart

## 2018-04-28 NOTE — ED NOTES
Patient provided with 16oz  cup of water for CT. Patient unable to provide urine specimen at this time.  Urinal at bedside for patient use

## 2018-04-28 NOTE — ED NOTES
While placing NG Tube, patient began to projectile vomit. Patient's gown changed at this time. Unable to verify placement with stethoscope because of the amount of times patient vomited. Xray called directly after placement to have xray completed.

## 2018-04-29 NOTE — OP NOTES
[address]  OPERATIVE REPORT    Brady Alejo  MR#: [id number]  : 1926  ACCOUNT #: [de-identified]   DATE OF SERVICE: 2018    PREOPERATIVE DIAGNOSES:  Recurrent incarcerated left inguinal hernia with obstruction. POSTOPERATIVE DIAGNOSES:  Recurrent incarcerated left inguinal hernia with obstruction. Mass involving incarcerated sigmoid colon and hernia. PROCEDURE:  Reduction and repair of left inguinal hernia with sigmoid resection and end colostomy. SURGEON:  Boy Salgado MD    ANESTHESIA:  General endotracheal tube. ASSISTANT:  None. ESTIMATED BLOOD LOSS:  100 mL. SPECIMENS REMOVED:  1.  Scrotal mass. 2.  Incarcerated sigmoid colon. 3.  Left inguinal lymph node. 4.  Proximal margin of sigmoid colon/ostomy site. COMPLICATIONS:  Unexpected mass involving incarcerated sigmoid colon resulting in resection and therefore colostomy. IMPLANTS:  Piece of hernia mesh is placed. This is a Bard Davol lot number [id number], implanted in the left groin. INDICATION FOR PROCEDURE:  This is a 19-year-old white male who presents with an incarcerated left inguinal hernia as well as bowel obstruction. He had this hernia repaired approximately 40 years ago, but it recurred after that repair. He never did anything about it because he could usually reduce the hernia. He comes in now with obstruction at the level of the sigmoid colon and will be brought to surgery for reduction and repair of hernia and hopefully resolution of his bowel obstruction. FINDINGS AT THE TIME OF SURGERY:  The patient has an incarcerated left inguinal hernia with an extensive amount of fibrosis extending down into the scrotum. He also appears to have a hydrocele.   There is sigmoid colon within the hernia sac, which is dusky in color, but of significance is the fact that there is an inflammatory mass or a mass of some form in the hernia as well intimately involved with the colon.  After extensive evaluation a decision is made that the sigmoid colon within the hernia because of the mass requires resection. This is performed. The distal limb of sigmoid has dropped back into the abdominal cavity as a Reba pouch. There is a Prolene attached to this, so it may be easier to find at the time of reversal of his colostomy. The proximal limb or limb coming down from above is mobilized such that it can be brought out for a colostomy. This was mobilized primarily by partially taking down the mesentery to this segment of the sigmoid colon and then taking down the lateral reflection. This is done through our hernia incision. The bowel was then brought up to the anterior abdominal wall where an ostomy site is created and the bowel was brought out through this site and eventually matured. The floor of the canal was essentially obliterated by this process and it appears that his previous repair may have had mesh within it, but it is unclear at what level the mesh had been placed. An attempt was made to repair the floor with his natural tissues. It is relatively weak and so this is reinforced with a superficial onlay of a Bard mesh in hopes that this will decrease the likelihood of recurrence. PROCEDURE:  With the patient in the supine position under adequate general anesthesia, the abdomen and left groin are prepped and draped in the usual fashion. After an appropriate time out with epinephrine was injected into the scar which already exist in the left lower quadrant from his previous repair and into the medial aspect of this where the new incision will be extended. Following this, an incision is made in the groin and carried down through the subcutaneous tissues. The layer consistent with Scarpas fascia is identified and incised but underlying this there is extensive scarring and fibrosis of the tissues.   Eventually in dissecting out this area it appears we encounter a layer consistent with an external oblique fascia, which appears to be at the upper aspect of the hernia, but it is inflamed and adherent to what appears to be the hernia sac going down into the scrotum. This is partially incised and there is underlying muscle so it appears that we have found external oblique fascia, but it appears that it is above the level of the internal ring as the hernia dives right at this point. At this point in time, we dissected the hernia sac/mass away from the surrounding tissues and mobilized it up into our incision. Of note is the fact that the testicle is somewhat sclerosed down and is adherent to the hernia sac which extends into the hydrocele into the scrotum. Following this, we attempted to identify cord structures and there is a segment of tissue which is scarred in which appears to be where the cord structures would be and this runs into the testicle. The seminal vesicle is somewhat intact as a landmark. This is dissected away from the structures in the hernia sac and in doing this, the hernia sac is quite fibrosed, ill-defined and we entered the hernia sac and dissected away from surrounding structures up to what would normally be the internal ring, but at this point in time, the internal ring essentially is the floor of the inguinal canal.  We then dissected structures away from this area and find sigmoid colon coming up from the abdomen and another limb going back into the abdomen and then at the apex there is a mass involving the apex of the sigmoid and this hernia has a hard firm mass which is somewhat lobulated and is splaying out the sigmoid colon appearing to cause obstruction. The bowel was also somewhat dusky in appearance having been in the hernia and incarcerated. At this point in time, the two limbs of bowel are identified and a linear stapler was used to transect the proximal and distal limbs of sigmoid colon.   The mesentery was taken down and we sent a specimen of the sigmoid colon to the pathologist.  This was opened and it appears that the mass is extrinsic to the sigmoid colon. There was also additional mass removed from near the testicle, which was sent to pathology and then in further dissection, what appears to be a lymph node is mobilized as well and sent to pathology. Our distal limb is dropped into the abdominal cavity as a Reba pouch. A Prolene suture was placed on this in hopes that this will assist with finding it at the time of closure of the colostomy. Our proximal limb is then mobilized up into the hernia incision and the mesentery is partially taken down between Hayde Rout clamps and tied off with Vicryl ties. We then through this incision our opening into the abdominal cavity, took down the lateral reflection to some extent further mobilizing up this sigmoid colon. It was then grasped with a Vermilion and reduced back into the abdominal cavity, brought up to the abdominal wall to determine where an ostomy could be created. Once this site is identified a disk of skin is excised and the underlying tissue was defatted down to the underlying rectus fascia. The anterior rectus fascia was incised with a cruciate incision and then the underlying rectus muscle was spread exposing the posterior rectus fascia, which is incised again with cruciate ligament and entry into the   abdominal cavity. We are then able to pass our sigmoid colon from the hernia site up to the ostomy site quite easily and this was brought out at this site. We then returned to our inguinal site and we were able to in the upper aspect define a segment of external oblique fascia and this is partially closed. We then found a portion of the fibrous tissue consistent with the spermatic cord structures and tacked these to a lateral tract, which may be consistent with the iliotibial tract. Inferiorly we were able to close tissue as well, but there was obvious weakness in our closure.   The testicle is also high up in the inguinal canal at this point in time, and not all the way down into the scrotum. Because of the concern about the weakness of our repair and closure, a piece of Bard mesh is placed over our repaired and tacked as an onlay with multiple interrupted Vicryl sutures. Dee Dee fascia was then closed using a running Vicryl suture and the skin is approximated using skin clips. We then moved our attention back to the ostomy. Several sutures were placed between the rectus fascia and the bowel as it comes up through to our ostomy site and then the ostomy was matured using multiple interrupted Monocryl sutures. Ostomy appliance is applied at the site. Sterile dressing is applied at our inguinal site and the patient is transferred to the recovery area in stable condition having tolerated the procedure with needle, sponge and instrument counts being reported as correct with an NG tube and Desir catheter in place.       MD TAMELA Chisholm / KIESHA  D: 04/28/2018 19:42     T: 04/28/2018 20:28  JOB #: [id number]

## 2018-04-29 NOTE — PROGRESS NOTES
Bedside and Verbal shift change report given to [address] (oncoming nurse) by CORIE Haji RN (offgoing nurse). Report given with SBAR, Kardex, Intake/Output, MAR and Recent Results.

## 2018-04-29 NOTE — PROGRESS NOTES
Cardiology Progress Note      4/29/2018 12:42 PM    Admit Date: 4/28/2018    Admit Diagnosis: Hernia, inguinal, recurrent, with obstruction; Pancreatic ma*      Subjective: Kathryn Arriola denies any chest pain, SOB. Sinus rhythm on tele. Visit Vitals    /73    Pulse 78    Temp 98.5 °F (36.9 °C)    Resp 16    Ht 6' (1.829 m)    Wt 193 lb 12.8 oz (87.9 kg)    SpO2 95%    BMI 26.28 kg/m2       Current Facility-Administered Medications   Medication Dose Route Frequency    metoprolol (LOPRESSOR) injection 2.5 mg  2.5 mg IntraVENous Q6H    sodium chloride (NS) flush 5-10 mL  5-10 mL IntraVENous Q8H    sodium chloride (NS) flush 5-10 mL  5-10 mL IntraVENous PRN    0.9% sodium chloride infusion  100 mL/hr IntraVENous CONTINUOUS    ondansetron (ZOFRAN) injection 4 mg  4 mg IntraVENous Q4H PRN    piperacillin-tazobactam (ZOSYN) 3.375 g in 0.9% sodium chloride (MBP/ADV) 100 mL  3.375 g IntraVENous Q8H    hydrALAZINE (APRESOLINE) 20 mg/mL injection 10 mg  10 mg IntraVENous Q6H PRN    sodium chloride (NS) flush 5-10 mL  5-10 mL IntraVENous Q8H    sodium chloride (NS) flush 5-10 mL  5-10 mL IntraVENous PRN    morphine injection 2 mg  2 mg IntraVENous Q2H PRN    enoxaparin (LOVENOX) injection 40 mg  40 mg SubCUTAneous DAILY         Objective:      Physical Exam:  Visit Vitals    /73    Pulse 78    Temp 98.5 °F (36.9 °C)    Resp 16    Ht 6' (1.829 m)    Wt 193 lb 12.8 oz (87.9 kg)    SpO2 95%    BMI 26.28 kg/m2     General Appearance:  Well developed, well nourished,alert and oriented x 3, and individual in no acute distress. Ears/Nose/Mouth/Throat:   Hearing grossly normal.         Neck: Supple. Chest:   Lungs clear to auscultation bilaterally. Cardiovascular:  Regular rate and rhythm, S1, S2 normal, no murmur. Abdomen:   Soft, non-tender, bowel sounds are active. Extremities: No edema bilaterally. Skin: Warm and dry.                Data Review:   Labs: Recent Results (from the past 24 hour(s))   METABOLIC PANEL, COMPREHENSIVE    Collection Time: 04/29/18  3:58 AM   Result Value Ref Range    Sodium 136 136 - 145 mmol/L    Potassium 3.8 3.5 - 5.1 mmol/L    Chloride 101 97 - 108 mmol/L    CO2 27 21 - 32 mmol/L    Anion gap 8 5 - 15 mmol/L    Glucose 129 (H) 65 - 100 mg/dL    BUN 25 (H) 6 - 20 MG/DL    Creatinine 1.15 0.70 - 1.30 MG/DL    BUN/Creatinine ratio 22 (H) 12 - 20      GFR est AA >60 >60 ml/min/1.73m2    GFR est non-AA 60 (L) >60 ml/min/1.73m2    Calcium 8.4 (L) 8.5 - 10.1 MG/DL    Bilirubin, total 1.7 (H) 0.2 - 1.0 MG/DL    ALT (SGPT) 14 12 - 78 U/L    AST (SGOT) 18 15 - 37 U/L    Alk. phosphatase 55 45 - 117 U/L    Protein, total 5.6 (L) 6.4 - 8.2 g/dL    Albumin 2.8 (L) 3.5 - 5.0 g/dL    Globulin 2.8 2.0 - 4.0 g/dL    A-G Ratio 1.0 (L) 1.1 - 2.2     CBC WITH AUTOMATED DIFF    Collection Time: 04/29/18  3:58 AM   Result Value Ref Range    WBC 13.3 (H) 4.1 - 11.1 K/uL    RBC 3.80 (L) 4.10 - 5.70 M/uL    HGB 13.3 12.1 - 17.0 g/dL    HCT 38.6 36.6 - 50.3 %    .6 (H) 80.0 - 99.0 FL    MCH 35.0 (H) 26.0 - 34.0 PG    MCHC 34.5 30.0 - 36.5 g/dL    RDW 12.4 11.5 - 14.5 %    PLATELET 924 963 - 696 K/uL    MPV 9.9 8.9 - 12.9 FL    NRBC 0.0 0  WBC    ABSOLUTE NRBC 0.00 0.00 - 0.01 K/uL    NEUTROPHILS 83 (H) 32 - 75 %    LYMPHOCYTES 7 (L) 12 - 49 %    MONOCYTES 10 5 - 13 %    EOSINOPHILS 0 0 - 7 %    BASOPHILS 0 0 - 1 %    IMMATURE GRANULOCYTES 1 (H) 0.0 - 0.5 %    ABS. NEUTROPHILS 11.0 (H) 1.8 - 8.0 K/UL    ABS. LYMPHOCYTES 0.9 0.8 - 3.5 K/UL    ABS. MONOCYTES 1.3 (H) 0.0 - 1.0 K/UL    ABS. EOSINOPHILS 0.0 0.0 - 0.4 K/UL    ABS. BASOPHILS 0.0 0.0 - 0.1 K/UL    ABS. IMM.  GRANS. 0.1 (H) 0.00 - 0.04 K/UL    DF AUTOMATED     PROTHROMBIN TIME + INR    Collection Time: 04/29/18 10:28 AM   Result Value Ref Range    INR 1.1 0.9 - 1.1      Prothrombin time 11.0 9.0 - 11.1 sec       Telemetry: normal sinus rhythm      Assessment:     Active Problems: Pancreatic mass (4/28/2018)      Hernia, inguinal, recurrent, with obstruction (4/28/2018)        Plan:     Stable post op. Check EKG.     [address] Leann Mari MD

## 2018-04-29 NOTE — ANESTHESIA POSTPROCEDURE EVALUATION
Post-Anesthesia Evaluation and Assessment    Patient: Radha Diaz MRN: [id number]  SSN: [id number]    YOB: 1926  Age: 80 y.o. Sex: male       Cardiovascular Function/Vital Signs  Visit Vitals    /66 (BP 1 Location: Left arm, BP Patient Position: At rest)    Pulse 66    Temp 36.3 °C (97.4 °F)    Resp 15    Ht 6' (1.829 m)    Wt 87.9 kg (193 lb 12.8 oz)    SpO2 92%    BMI 26.28 kg/m2       Patient is status post general anesthesia for Procedure(s):  REDUCED AND REPAIRED LEFT INGUINAL HERNIA WITH SIGMOID RESECTION END COLOSTOMY. Nausea/Vomiting: None    Postoperative hydration reviewed and adequate. Pain:  Pain Scale 1: Numeric (0 - 10) (04/28/18 2000)  Pain Intensity 1: 0 (04/28/18 2000)   Managed    Neurological Status:   Neuro (WDL): Exceptions to [facility] (04/28/18 1930)  Neuro  Neurologic State: Drowsy; Agitated (04/28/18 1930)  Orientation Level: Oriented X4 (04/28/18 1930)  Cognition: Decreased attention/concentration;Decreased command following;Poor safety awareness (04/28/18 1930)  Speech: Delayed responses (04/28/18 1930)  LUE Motor Response: Purposeful (04/28/18 1930)  LLE Motor Response: Purposeful (04/28/18 1930)  RUE Motor Response: Purposeful (04/28/18 1930)  RLE Motor Response: Purposeful (04/28/18 1930)   At baseline    Mental Status and Level of Consciousness: Arousable    Pulmonary Status:   O2 Device: Nasal cannula (04/28/18 2000)   Adequate oxygenation and airway patent    Complications related to anesthesia: None    Post-anesthesia assessment completed.  No concerns    Signed By: Elder Tena MD     April 28, 2018

## 2018-04-29 NOTE — PROGRESS NOTES
Admit Date: 2018    POD 1 Day Post-Op    Procedure:  Procedure(s) with comments:  REDUCED AND REPAIRED LEFT INGUINAL HERNIA WITH SIGMOID RESECTION END COLOSTOMY - REPAIR LEFT INGUINAL HERNIA, WITH POSSIBLE EXPLORATORY LAPAROTOMY    Subjective:     Patient has no new complaints. Objective:     Blood pressure 143/58, pulse 81, temperature 99.2 °F (37.3 °C), resp. rate 16, height 6' (1.829 m), weight 193 lb 12.8 oz (87.9 kg), SpO2 92 %. Temp (24hrs), Av °F (36.7 °C), Min:97.3 °F (36.3 °C), Max:99.2 °F (37.3 °C)      Physical Exam:  GENERAL: alert, cooperative, no distress, appears stated age, LUNG: nl effort, HEART: regular rate and rhythm, ABDOMEN: good ostomy function, EXTREMITIES:  extremities normal, atraumatic, no cyanosis or edema    Labs:   Recent Results (from the past 24 hour(s))   METABOLIC PANEL, COMPREHENSIVE    Collection Time: 18  3:58 AM   Result Value Ref Range    Sodium 136 136 - 145 mmol/L    Potassium 3.8 3.5 - 5.1 mmol/L    Chloride 101 97 - 108 mmol/L    CO2 27 21 - 32 mmol/L    Anion gap 8 5 - 15 mmol/L    Glucose 129 (H) 65 - 100 mg/dL    BUN 25 (H) 6 - 20 MG/DL    Creatinine 1.15 0.70 - 1.30 MG/DL    BUN/Creatinine ratio 22 (H) 12 - 20      GFR est AA >60 >60 ml/min/1.73m2    GFR est non-AA 60 (L) >60 ml/min/1.73m2    Calcium 8.4 (L) 8.5 - 10.1 MG/DL    Bilirubin, total 1.7 (H) 0.2 - 1.0 MG/DL    ALT (SGPT) 14 12 - 78 U/L    AST (SGOT) 18 15 - 37 U/L    Alk.  phosphatase 55 45 - 117 U/L    Protein, total 5.6 (L) 6.4 - 8.2 g/dL    Albumin 2.8 (L) 3.5 - 5.0 g/dL    Globulin 2.8 2.0 - 4.0 g/dL    A-G Ratio 1.0 (L) 1.1 - 2.2     CBC WITH AUTOMATED DIFF    Collection Time: 18  3:58 AM   Result Value Ref Range    WBC 13.3 (H) 4.1 - 11.1 K/uL    RBC 3.80 (L) 4.10 - 5.70 M/uL    HGB 13.3 12.1 - 17.0 g/dL    HCT 38.6 36.6 - 50.3 %    .6 (H) 80.0 - 99.0 FL    MCH 35.0 (H) 26.0 - 34.0 PG    MCHC 34.5 30.0 - 36.5 g/dL    RDW 12.4 11.5 - 14.5 %    PLATELET 605 532 - 737 K/uL MPV 9.9 8.9 - 12.9 FL    NRBC 0.0 0  WBC    ABSOLUTE NRBC 0.00 0.00 - 0.01 K/uL    NEUTROPHILS 83 (H) 32 - 75 %    LYMPHOCYTES 7 (L) 12 - 49 %    MONOCYTES 10 5 - 13 %    EOSINOPHILS 0 0 - 7 %    BASOPHILS 0 0 - 1 %    IMMATURE GRANULOCYTES 1 (H) 0.0 - 0.5 %    ABS. NEUTROPHILS 11.0 (H) 1.8 - 8.0 K/UL    ABS. LYMPHOCYTES 0.9 0.8 - 3.5 K/UL    ABS. MONOCYTES 1.3 (H) 0.0 - 1.0 K/UL    ABS. EOSINOPHILS 0.0 0.0 - 0.4 K/UL    ABS. BASOPHILS 0.0 0.0 - 0.1 K/UL    ABS. IMM. GRANS. 0.1 (H) 0.00 - 0.04 K/UL    DF AUTOMATED     PROTHROMBIN TIME + INR    Collection Time: 04/29/18 10:28 AM   Result Value Ref Range    INR 1.1 0.9 - 1.1      Prothrombin time 11.0 9.0 - 11.1 sec       Data Review reviewed  Consultants documentation, I & O and labs    Assessment:     Active Problems:    Pancreatic mass (4/28/2018)      Hernia, inguinal, recurrent, with obstruction (4/28/2018)        Plan/Recommendations/Medical Decision Making:     Continue present treatment  Discontinue nasogastric tube  Diet  clear  GI consult to Dr Carey Sawant for pancreatic Mass   Awaiting path from [address]    Dr Rojas Congress will assume Surgical management of patient in [facility].  Aarti Byrd MD, [facility]

## 2018-04-29 NOTE — PROGRESS NOTES
Hospitalist Progress Note    NAME: Radha Diaz   :  1926   MRN:  [id number]       Assessment / Plan:  Incarcerated Left Inguinal Hernia with SBO: s/p Surgery, still not passing gas, very decreased bowel sounds, NGT in place, keep NPO, Surgery in the case. C/w Zosyn for now. HTN: start low dose metoprolol IV, use hydralazine PRRN, hold Norvasc, labetalol and HCTZ for now. Complex Pancreatic Mass: check Ca19-9, may need GI evaluation. Leukocytosis: so far decreasing, on Zosyn, U/A and CXR are negative. Multiple Pulmonary Nodules: needs image follow up as outpatient. Hyperlipidemia: is NPO at this time, hold meds for now. Body mass index is 26.28 kg/(m^2). Code status: Full  Prophylaxis: Lovenox  Recommended Disposition:  PT, OT, RN     Subjective:     Chief Complaint / Reason for Physician Visit  \"I feel a little better\". Discussed with RN events overnight. Review of Systems:  Symptom Y/N Comments  Symptom Y/N Comments   Fever/Chills    Chest Pain     Poor Appetite    Edema     Cough    Abdominal Pain y    Sputum    Joint Pain     SOB/BUI    Pruritis/Rash     Nausea/vomit    Tolerating PT/OT     Diarrhea    Tolerating Diet     Constipation    Other       Could NOT obtain due to:      Objective:     VITALS:   Last 24hrs VS reviewed since prior progress note.  Most recent are:  Patient Vitals for the past 24 hrs:   Temp Pulse Resp BP SpO2   18 0739 (P) 98.5 °F (36.9 °C) 78 (P) 16 147/73 95 %   18 0353 98.3 °F (36.8 °C) 73 16 135/64 94 %   18 2338 97.9 °F (36.6 °C) 68 - 146/68 97 %   18 2110 97.3 °F (36.3 °C) 64 15 164/64 94 %   18 2020 97.3 °F (36.3 °C) 64 13 171/66 93 %   18 -  (!) 7 157/71 93 %   18 -  15 159/66 92 %   18 15 171/75 92 %   18 10 (!) 161/109 91 %   18 - 81 18 152/70 (!) 88 %   18 97.4 °F (36.3 °C) 92 17 147/79 94 %   18 97.4 °F (36.3 °C) 82 18 131/78 91 %   04/28/18 1616 98.4 °F (36.9 °C) 73 15 151/88 93 %   04/28/18 1548 98.4 °F (36.9 °C) 74 24 (!) 184/96 94 %   04/28/18 1346 - 76 23 156/70 -   04/28/18 1254 - 90 21 - 94 %   04/28/18 1138 - 87 16 - 95 %       Intake/Output Summary (Last 24 hours) at 04/29/18 1122  Last data filed at 04/29/18 0745   Gross per 24 hour   Intake             3125 ml   Output              895 ml   Net             2230 ml        PHYSICAL EXAM:  General: WD, WN. Alert, cooperative, no acute distress    EENT:  EOMI. Anicteric sclerae. MMM  Resp:  Coarse BS  CV:  Regular  rhythm,  No edema  GI:  Soft, Non distended, mild  tender.  Bowel sounds decreased  Neurologic:  Alert and oriented X 3, normal speech,   Psych:   Good insight. Not anxious nor agitated  Skin:  No rashes. No jaundice    Reviewed most current lab test results and cultures  YES  Reviewed most current radiology test results   YES  Review and summation of old records today    NO  Reviewed patient's current orders and MAR    YES  PMH/ reviewed - no change compared to H&P  ________________________________________________________________________  Care Plan discussed with:    Comments   Patient y    Family  y    RN y    Care Manager     Consultant                        Multidiciplinary team rounds were held today with , nursing, pharmacist and clinical coordinator. Patient's plan of care was discussed; medications were reviewed and discharge planning was addressed.      ________________________________________________________________________  Total NON critical care TIME: 35   Minutes    Total CRITICAL CARE TIME Spent:   Minutes non procedure based      Comments   >50% of visit spent in counseling and coordination of care y    ________________________________________________________________________  Mata Mcdowell MD     Procedures: see electronic medical records for all procedures/Xrays and details which were not copied into this note but were reviewed prior to creation of Plan. LABS:  I reviewed today's most current labs and imaging studies.   Pertinent labs include:  Recent Labs      04/29/18   0358  04/28/18   0834   WBC  13.3*  14.6*   HGB  13.3  15.2   HCT  38.6  42.5   PLT  217  242     Recent Labs      04/29/18   1028  04/29/18   0358  04/28/18   0834   NA   --   136  133*   K   --   3.8  3.9   CL   --   101  97   CO2   --   27  28   GLU   --   129*  190*   BUN   --   25*  28*   CREA   --   1.15  1.24   CA   --   8.4*  9.6   MG   --    --   1.6   ALB   --   2.8*  3.5   TBILI   --   1.7*  1.6*   SGOT   --   18  15   ALT   --   14  17   INR  1.1   --    --        Signed: Aquiles Winslow MD

## 2018-04-30 PROBLEM — R94.31 ECG ABNORMALITY: Status: ACTIVE | Noted: 2018-01-01

## 2018-04-30 NOTE — CONSULTS
GI Consultation Note Swapnil Pittman)    NAME: Radha Diaz : 1926 MRN: [id number]   ATTG: NICKY Mcpherson MD PCP: PROVIDER UNKNOWN  Date/Time:  2018 3:11 PM  Subjective:   REASON FOR CONSULT:      Lyndsay Wilcox is a 80 y.o.  male with hx of HTN, distant TIA, and ENMA on CPAP, who I was asked to see for pancreatic mass found on abnl GI CT. He was admitted 18 with  with an incarcerated left inguinal hernia noting no BM or flatus since 18. This bowel pattern change became associated with LLQ pain, radiating into his back with concurrent N/V.  ER eval noted incarcerated left inguinal hernia and a newly discovered heterogeneously enhancing complex mass in the pancreatic tail and body, measuring 6.7 x 4.8 x 4.6 cm, containing calcification without obvious associated ductal dilatation. While the fat plane between this mass and the gastric wall is obliterated, this mass is not felt to arise from stomach. Admission WBC was 14.6, but is ow 10.5. Glucose was elevated at 190 but he denies history of DM. Bilirubin was elevated at 1.6 on admission and now is 2.3, but transaminases and lipase are normal.  He underwent reduction and repair of left inguinal hernia with sigmoid resection and end colostomy on 18. He denies hx of pancreatitis or EtOH use.   He was noted to be on Plavix up until day PTA. He denies GIB, melena, BRBPR, further N/V, CP, or SOB. He is enjoying his dinner at the time of interview. Past Medical History:   Diagnosis Date    Arthritis     ECG abnormality 2018    GERD (gastroesophageal reflux disease)     High cholesterol     Hypertension     Other ill-defined conditions(799.89) ~2000    arm tingling to ER, per pt states was not a stroke, but has been on plavix and aggrenox since.   currently on plavix    Sleep apnea with use of continuous positive airway pressure (CPAP)       Past Surgical History:   Procedure Laterality Date    HX CATARACT REMOVAL Bilateral 2008 and 2012    HX HERNIA REPAIR Left     HX ORTHOPAEDIC Right     elbow     TOTAL KNEE ARTHROPLASTY Left 1999    TOTAL KNEE ARTHROPLASTY Right 2011     Social History   Substance Use Topics    Smoking status: Never Smoker    Smokeless tobacco: Not on file    Alcohol use No      History reviewed. No pertinent family history. Allergies   Allergen Reactions    Aleve [Naproxen Sodium] Swelling     Throat swelling      Home Medications:  Prior to Admission Medications   Prescriptions Last Dose Informant Patient Reported? Taking? Cholecalciferol, Vitamin D3, (VITAMIN D3) 1,000 unit cap 4/27/2018 at Unknown time  Yes Yes   Sig: Take 2 Tabs by mouth daily. amLODIPine (NORVASC) 10 mg tablet 4/27/2018 at Unknown time  Yes Yes   Sig: Take 10 mg by mouth daily. clonazePAM (KLONOPIN) 0.5 mg tablet Unknown at Unknown time  Yes No   Sig: Take 0.5 mg by mouth nightly as needed. clopidogrel (PLAVIX) 75 mg tablet 4/27/2018 at Unknown time  Yes Yes   Sig: Take 75 mg by mouth daily. hydrochlorothiazide (HYDRODIURIL) 25 mg tablet 4/27/2018 at Unknown time  Yes Yes   Sig: Take 25 mg by mouth daily. Indications: HYPERTENSION   labetalol (NORMODYNE) 200 mg tablet   Yes Yes   Sig: Take 200 mg by mouth two (2) times a day. Indications: HYPERTENSION   omega-3 fatty acids-vitamin e (FISH OIL) 1,000 mg cap   Yes Yes   Sig: Take 2 Caps by mouth daily. potassium chloride SR (KLOR-CON 10) 10 mEq tablet   Yes Yes   Sig: Take 20 mEq by mouth. Indications: HYPOKALEMIA PREVENTION   pravastatin (PRAVACHOL) 40 mg tablet   Yes Yes   Sig: Take 40 mg by mouth nightly. Indications: HYPERCHOLESTEROLEMIA   thiamine (VITAMIN B-1) 100 mg tablet   Yes Yes   Sig: Take 100 mg by mouth daily. vitamin E (AQUA GEMS) 400 unit capsule   Yes Yes   Sig: Take 800 Units by mouth daily.       Facility-Administered Medications: None     Hospital medications:  Current Facility-Administered Medications   Medication Dose Route Frequency    labetalol (NORMODYNE) tablet 200 mg  200 mg Oral BID    amLODIPine (NORVASC) tablet 10 mg  10 mg Oral DAILY    hydroCHLOROthiazide (HYDRODIURIL) tablet 25 mg  25 mg Oral DAILY    sodium chloride (NS) flush 5-10 mL  5-10 mL IntraVENous Q8H    sodium chloride (NS) flush 5-10 mL  5-10 mL IntraVENous PRN    0.9% sodium chloride infusion  50 mL/hr IntraVENous CONTINUOUS    ondansetron (ZOFRAN) injection 4 mg  4 mg IntraVENous Q4H PRN    piperacillin-tazobactam (ZOSYN) 3.375 g in 0.9% sodium chloride (MBP/ADV) 100 mL  3.375 g IntraVENous Q8H    hydrALAZINE (APRESOLINE) 20 mg/mL injection 10 mg  10 mg IntraVENous Q6H PRN    sodium chloride (NS) flush 5-10 mL  5-10 mL IntraVENous PRN    morphine injection 2 mg  2 mg IntraVENous Q2H PRN    enoxaparin (LOVENOX) injection 40 mg  40 mg SubCUTAneous DAILY     REVIEW OF SYSTEMS:     [x]    Total of 11 systems reviewed as follows:  Const:   negative fever, negative chills, negative weight loss  Eyes:   negative diplopia or visual changes, negative eye pain  ENT:   negative coryza, negative sore throat  Resp:   negative cough, hemoptysis, dyspnea  Cards:  negative for chest pain, palpitations, lower extremity edema  :  negative for frequency, dysuria and hematuria  Skin:   negative for rash and pruritus  Heme:   negative for easy bruising and gum/nose bleeding  MS:  negative for myalgias, arthralgias, back pain and muscle weakness  Neurolo:  negative for headaches, dizziness, vertigo, memory problems   Psych:  negative for feelings of anxiety, depression     Pertinent Positives include :    Objective:   VITALS:    Visit Vitals    /68    Pulse 92    Temp 98.4 °F (36.9 °C)    Resp 16    Ht 6' (1.829 m)    Wt 87.9 kg (193 lb 12.8 oz)    SpO2 96%    BMI 26.28 kg/m2     Temp (24hrs), Av.8 °F (37.1 °C), Min:98.2 °F (36.8 °C), Max:99.4 °F (37.4 °C)    PHYSICAL EXAM:   General:    Alert, cooperative, no distress, appears stated age.      Head:   Normocephalic, without obvious abnormality, atraumatic. Eyes:   Conjunctivae clear, anicteric sclerae. Pupils are equal  Nose:  Nares normal. No drainage or sinus tenderness. Throat:    Lips, mucosa, and tongue normal.  No Thrush  Neck:  Supple, symmetrical,  no adenopathy, thyroid: non tender  Back:    Symmetric,  No CVA tenderness. Lungs:   CTA bilaterally. No wheezing/rhonchi/rales. Chest wall:  No tenderness or deformity. No Accessory muscle use. Heart:   Regular rate and rhythm,  no murmur, rub or gallop. Abdomen:   S, mildly distended, NT except at LLQ colostomy; +NABS. Extremities: Atraumatic, No cyanosis. No edema. No clubbing  Skin:     Texture, turgor normal. No rashes/lesions/jaundice  Lymph: Cervical, supraclavicular normal.  Psych:  Good insight. Not depressed. Not anxious or agitated. Neurologic: EOMs intact. Normal  strength, A/O X 3. LAB DATA REVIEWED:    Recent Results (from the past 48 hour(s))   METABOLIC PANEL, COMPREHENSIVE    Collection Time: 04/29/18  3:58 AM   Result Value Ref Range    Sodium 136 136 - 145 mmol/L    Potassium 3.8 3.5 - 5.1 mmol/L    Chloride 101 97 - 108 mmol/L    CO2 27 21 - 32 mmol/L    Anion gap 8 5 - 15 mmol/L    Glucose 129 (H) 65 - 100 mg/dL    BUN 25 (H) 6 - 20 MG/DL    Creatinine 1.15 0.70 - 1.30 MG/DL    BUN/Creatinine ratio 22 (H) 12 - 20      GFR est AA >60 >60 ml/min/1.73m2    GFR est non-AA 60 (L) >60 ml/min/1.73m2    Calcium 8.4 (L) 8.5 - 10.1 MG/DL    Bilirubin, total 1.7 (H) 0.2 - 1.0 MG/DL    ALT (SGPT) 14 12 - 78 U/L    AST (SGOT) 18 15 - 37 U/L    Alk.  phosphatase 55 45 - 117 U/L    Protein, total 5.6 (L) 6.4 - 8.2 g/dL    Albumin 2.8 (L) 3.5 - 5.0 g/dL    Globulin 2.8 2.0 - 4.0 g/dL    A-G Ratio 1.0 (L) 1.1 - 2.2     CBC WITH AUTOMATED DIFF    Collection Time: 04/29/18  3:58 AM   Result Value Ref Range    WBC 13.3 (H) 4.1 - 11.1 K/uL    RBC 3.80 (L) 4.10 - 5.70 M/uL    HGB 13.3 12.1 - 17.0 g/dL    HCT 38.6 36.6 - 50.3 %    .6 (H) 80.0 - 99.0 FL    MCH 35.0 (H) 26.0 - 34.0 PG    MCHC 34.5 30.0 - 36.5 g/dL    RDW 12.4 11.5 - 14.5 %    PLATELET 030 812 - 621 K/uL    MPV 9.9 8.9 - 12.9 FL    NRBC 0.0 0  WBC    ABSOLUTE NRBC 0.00 0.00 - 0.01 K/uL    NEUTROPHILS 83 (H) 32 - 75 %    LYMPHOCYTES 7 (L) 12 - 49 %    MONOCYTES 10 5 - 13 %    EOSINOPHILS 0 0 - 7 %    BASOPHILS 0 0 - 1 %    IMMATURE GRANULOCYTES 1 (H) 0.0 - 0.5 %    ABS. NEUTROPHILS 11.0 (H) 1.8 - 8.0 K/UL    ABS. LYMPHOCYTES 0.9 0.8 - 3.5 K/UL    ABS. MONOCYTES 1.3 (H) 0.0 - 1.0 K/UL    ABS. EOSINOPHILS 0.0 0.0 - 0.4 K/UL    ABS. BASOPHILS 0.0 0.0 - 0.1 K/UL    ABS. IMM.  GRANS. 0.1 (H) 0.00 - 0.04 K/UL    DF AUTOMATED     PROTHROMBIN TIME + INR    Collection Time: 04/29/18 10:28 AM   Result Value Ref Range    INR 1.1 0.9 - 1.1      Prothrombin time 11.0 9.0 - 11.1 sec   EKG, 12 LEAD, INITIAL    Collection Time: 04/29/18  2:51 PM   Result Value Ref Range    Ventricular Rate 81 BPM    Atrial Rate 81 BPM    P-R Interval 126 ms    QRS Duration 136 ms    Q-T Interval 408 ms    QTC Calculation (Bezet) 473 ms    Calculated P Axis 17 degrees    Calculated R Axis -95 degrees    Calculated T Axis -36 degrees    Diagnosis       ** Poor data quality, interpretation may be adversely affected    Sinus rhythm with premature supraventricular complexes  Right bundle branch block    Confirmed by Luis Fernando Bowman ([id number]) on 4/30/2018 9:14:27 AM     CBC WITH AUTOMATED DIFF    Collection Time: 04/30/18  4:18 AM   Result Value Ref Range    WBC 10.5 4.1 - 11.1 K/uL    RBC 3.50 (L) 4.10 - 5.70 M/uL    HGB 12.4 12.1 - 17.0 g/dL    HCT 36.1 (L) 36.6 - 50.3 %    .1 (H) 80.0 - 99.0 FL    MCH 35.4 (H) 26.0 - 34.0 PG    MCHC 34.3 30.0 - 36.5 g/dL    RDW 12.8 11.5 - 14.5 %    PLATELET 746 693 - 372 K/uL    MPV 10.2 8.9 - 12.9 FL    NRBC 0.0 0  WBC    ABSOLUTE NRBC 0.00 0.00 - 0.01 K/uL    NEUTROPHILS 75 32 - 75 %    LYMPHOCYTES 10 (L) 12 - 49 %    MONOCYTES 14 (H) 5 - 13 %    EOSINOPHILS 1 0 - 7 %    BASOPHILS 0 0 - 1 %    IMMATURE GRANULOCYTES 1 (H) 0.0 - 0.5 %    ABS. NEUTROPHILS 7.9 1.8 - 8.0 K/UL    ABS. LYMPHOCYTES 1.1 0.8 - 3.5 K/UL    ABS. MONOCYTES 1.5 (H) 0.0 - 1.0 K/UL    ABS. EOSINOPHILS 0.1 0.0 - 0.4 K/UL    ABS. BASOPHILS 0.0 0.0 - 0.1 K/UL    ABS. IMM. GRANS. 0.1 (H) 0.00 - 0.04 K/UL    DF AUTOMATED     MAGNESIUM    Collection Time: 04/30/18  4:18 AM   Result Value Ref Range    Magnesium 1.8 1.6 - 2.4 mg/dL   METABOLIC PANEL, COMPREHENSIVE    Collection Time: 04/30/18  4:18 AM   Result Value Ref Range    Sodium 137 136 - 145 mmol/L    Potassium 3.3 (L) 3.5 - 5.1 mmol/L    Chloride 104 97 - 108 mmol/L    CO2 24 21 - 32 mmol/L    Anion gap 9 5 - 15 mmol/L    Glucose 121 (H) 65 - 100 mg/dL    BUN 25 (H) 6 - 20 MG/DL    Creatinine 1.13 0.70 - 1.30 MG/DL    BUN/Creatinine ratio 22 (H) 12 - 20      GFR est AA >60 >60 ml/min/1.73m2    GFR est non-AA >60 >60 ml/min/1.73m2    Calcium 7.8 (L) 8.5 - 10.1 MG/DL    Bilirubin, total 2.3 (H) 0.2 - 1.0 MG/DL    ALT (SGPT) 14 12 - 78 U/L    AST (SGOT) 17 15 - 37 U/L    Alk. phosphatase 51 45 - 117 U/L    Protein, total 5.5 (L) 6.4 - 8.2 g/dL    Albumin 2.6 (L) 3.5 - 5.0 g/dL    Globulin 2.9 2.0 - 4.0 g/dL    A-G Ratio 0.9 (L) 1.1 - 2.2     LIPASE    Collection Time: 04/30/18  4:18 AM   Result Value Ref Range    Lipase 43 (L) 73 - 393 U/L     IMAGING RESULTS:   [x]      I have personally reviewed the actual   []    CXR  [x]    CT  []     US  CT ABD PELV W CONT 4/28/18  FINDINGS:   LUNG BASES: Numerous nodules and masses. Small atelectasis. INCIDENTALLY IMAGED HEART AND MEDIASTINUM: Unremarkable. LIVER: No mass or biliary dilatation. GALLBLADDER: Cholelithiasis without inflammatory change. SPLEEN: No mass. PANCREAS: Heterogeneously enhancing complex mass in the pancreatic tail and  body, measuring 6.7 x 4.8 x 4.6 cm, containing calcification without obvious  associated ductal dilatation.  While the fat plane between this mass and the  gastric wall is obliterated, this mass is not felt to arise from stomach. ADRENALS: Unremarkable. KIDNEYS: No mass, calculus, or hydronephrosis. STOMACH: Distended with fluid. SMALL BOWEL: Mildly to moderately distended. COLON: Distended with fluid throughout the right colon, transverse colon and  left colon to the level of incarceration of sigmoid colon in a left inguinal  hernia. Numerous diverticula in the sigmoid colon without associated acute  inflammatory change at this time. APPENDIX: Not well seen. PERITONEUM: No pneumoperitoneum. Ascites. RETROPERITONEUM: No lymphadenopathy or aortic aneurysm. Heavy aortic  calcification. REPRODUCTIVE ORGANS: Prostatic enlargement and heterogeneous enhancement. URINARY BLADDER: No mass or calculus. BONES: No destructive bone lesion. ADDITIONAL COMMENTS: Large left inguinal hernia containing fluid and sigmoid  colon. Small 2 moderate right inguinal hernia containing fluid only. IMPRESSION:  1. Mechanical obstruction of colon and small bowel by incarcerated hernia  containing sigmoid colon. Smaller right inguinal hernia containing fluid. 2. Complex mass in the upper abdomen thought to be of pancreatic origin. 3. Multiple pulmonary nodules. 4. Ascites. 5. Cholelithiasis. 6. Prostatic enlargement and heterogeneity. Correlate with physical examination  and other clinical and laboratory parameters. Recommendations/Plan:      Principal Problem:    Hernia, inguinal, recurrent, with obstruction (4/28/2018)    Active Problems:    Pancreatic mass (4/28/2018)      ECG abnormality (4/30/2018)       ___________________________________________________  RECOMMENDATIONS:    79yo M with large mass appearing to arise from the pancreatic body and tail with possible involvement of gastric wall in addition to multiple large pulmonary nodules noted. Tissue diagnosis will be helpful in defining treatment options.   I am concerned that the pulmonary nodules and ascites could represent metastatic disease, second primary, or peritoneal seeding. The noted calcification and size of the lesion in the pancreas argue for chronicity.     Plan:  1) CT Chest tomorrow  2) Hold Plavix- do not restart until timing of possible EUS determined  3) Will tentatively offer EUS-FNA on Wednesday afternoon- device is being used by others on Tuesday when I am available  4) Check final colon path    Discussed Code Status:    [x]    Full Code      []    DNR    ___________________________________________________  Care Plan discussed with:    [x]    Patient   [x]    Family   [x]    Nursing   [x]    Attending  Total Time :  50   minutes   ___________________________________________________  GI: James Plascencia MD

## 2018-04-30 NOTE — PROGRESS NOTES
Cm acknowledged consult for SNF for short term rehab and ostomy teaching. CM reviewed with patient and he would like referral submitted to [facility]. FOC copy to patient and his son and copy on chart. Referral submitted via ecin. PTOT notes are pending.      Katherin Bruce RN CM  Ext [phone]

## 2018-04-30 NOTE — PROGRESS NOTES
Hospitalist Progress Note    NAME: Erwin Stanton   :  1926   MRN:  [id number]       Assessment / Plan:  Incarcerated Left Inguinal Hernia with SBO: s/p Surgery, still not passing gas, but good BS today, NGT is out, tolerating GI lite, c/w Zosyn for now, under Surgery Care. HTN: resume po meds Norvasc, HCTZ, Labetalol, use hydralazine prn. Complex Pancreatic Mass: f/u  Ca19-9, may need GI evaluation. Leukocytosis: so far decreasing, on Zosyn, U/A and CXR are negative. Multiple Pulmonary Nodules: needs image follow up as outpatient. C/w incentive spirometer  Hyperlipidemia: is NPO at this time, hold meds for now. Hypokalemia: replace and monitor  Body mass index is 26.28 kg/(m^2). Code status: Full  Prophylaxis: Lovenox  Recommended Disposition:  PT, OT, RN     Subjective:     Chief Complaint / Reason for Physician Visit  \"I feel better\". Discussed with RN events overnight. Review of Systems:  Symptom Y/N Comments  Symptom Y/N Comments   Fever/Chills    Chest Pain     Poor Appetite    Edema     Cough    Abdominal Pain y    Sputum    Joint Pain     SOB/BUI    Pruritis/Rash     Nausea/vomit    Tolerating PT/OT     Diarrhea    Tolerating Diet y    Constipation    Other       Could NOT obtain due to:      Objective:     VITALS:   Last 24hrs VS reviewed since prior progress note.  Most recent are:  Patient Vitals for the past 24 hrs:   Temp Pulse Resp BP SpO2   18 1208 98.4 °F (36.9 °C) 92 16 159/68 96 %   18 0806 99.4 °F (37.4 °C) 76 18 159/70 96 %   18 0637 - 77 - 143/65 -   18 0314 98.3 °F (36.8 °C) 81 18 130/65 90 %   18 0058 - 82 - 128/62 -   18 2330 99.4 °F (37.4 °C) 81 18 137/58 92 %   18 1919 98.2 °F (36.8 °C) 82 18 126/58 92 %   18 1602 99.3 °F (37.4 °C) 83 16 140/63 92 %       Intake/Output Summary (Last 24 hours) at 18 1443  Last data filed at 18 0649   Gross per 24 hour   Intake          1911.67 ml   Output 650 ml   Net          1261.67 ml        PHYSICAL EXAM:  General: WD, WN. Alert, cooperative, no acute distress    EENT:  EOMI. Anicteric sclerae. MMM  Resp:  Coarse BS  CV:  Regular  rhythm,  No edema  GI:  Soft, Non distended, mild  tender.  Bowel sounds +, ostomy in place  Neurologic:  Alert and oriented X 3, normal speech,   Psych:   Good insight. Not anxious nor agitated  Skin:  No rashes. No jaundice    Reviewed most current lab test results and cultures  YES  Reviewed most current radiology test results   YES  Review and summation of old records today    NO  Reviewed patient's current orders and MAR    YES  PMH/SH reviewed - no change compared to H&P  ________________________________________________________________________  Care Plan discussed with:    Comments   Patient y    Family  y    RN y    Care Manager     Consultant                        Multidiciplinary team rounds were held today with , nursing, pharmacist and clinical coordinator. Patient's plan of care was discussed; medications were reviewed and discharge planning was addressed. ________________________________________________________________________  Total NON critical care TIME: 25   Minutes    Total CRITICAL CARE TIME Spent:   Minutes non procedure based      Comments   >50% of visit spent in counseling and coordination of care y    ________________________________________________________________________  Kathy Lopez MD     Procedures: see electronic medical records for all procedures/Xrays and details which were not copied into this note but were reviewed prior to creation of Plan. LABS:  I reviewed today's most current labs and imaging studies.   Pertinent labs include:  Recent Labs      04/30/18   0418  04/29/18   0358  04/28/18   0834   WBC  10.5  13.3*  14.6*   HGB  12.4  13.3  15.2   HCT  36.1*  38.6  42.5   PLT  203  217  242     Recent Labs      04/30/18   0418  04/29/18   1028  04/29/18   0358  04/28/18 0834   NA  137   --   136  133*   K  3.3*   --   3.8  3.9   CL  104   --   101  97   CO2  24   --   27  28   GLU  121*   --   129*  190*   BUN  25*   --   25*  28*   CREA  1.13   --   1.15  1.24   CA  7.8*   --   8.4*  9.6   MG  1.8   --    --   1.6   ALB  2.6*   --   2.8*  3.5   TBILI  2.3*   --   1.7*  1.6*   SGOT  17   --   18  15   ALT  14   --   14  17   INR   --   1.1   --    --        Signed: Ally Kearns MD

## 2018-04-30 NOTE — PROGRESS NOTES
Problem: Mobility Impaired (Adult and Pediatric)  Goal: *Acute Goals and Plan of Care (Insert Text)  Physical Therapy Goals  Initiated 4/30/2018  1. Patient will move from supine to sit and sit to supine  and roll side to side in bed with independence within 7 day(s). 2.  Patient will transfer from bed to chair and chair to bed with independence using the least restrictive device within 7 day(s). 3.  Patient will perform sit to stand with independence within 7 day(s). 4.  Patient will ambulate with modified independence for 200 feet with the least restrictive device within 7 day(s). 5.  Patient will ascend/descend 15 stairs with 1 handrail(s) with modified independence within 7 day(s). physical Therapy EVALUATION  Patient: Edgard Watters (52 y.o. male)  Date: 4/30/2018  Primary Diagnosis: Hernia, inguinal, recurrent, with obstruction  Pancreatic mass  inguinal hernia left  Procedure(s) (LRB):  REDUCED AND REPAIRED LEFT INGUINAL HERNIA WITH SIGMOID RESECTION END COLOSTOMY (Left) 2 Days Post-Op   Precautions:   Fall, Skin    ASSESSMENT :  Based on the objective data described below, the patient presents with decreased activity tolerance, generalized weakness, impaired balance, strength and ROM with increased fall risk following repaired hernia and sigmoid resection POD#2. PTA pt was independent with mobility, living with his son, and driving. Today pt requiring up to Min-Mod A for OOB mobility including transfers from elevated bed height and gait trial with RW. Pt did not use an AD prior to admission. Displayed short shuffled gait with minimal foot clearance and decreased gait speed. Total A to don shoes prior to mobility. Pt's heart rate fluctuating between 90-120bpm with a quick jump to 149bpm at end of gait training. Pt c/o \"short winded\" despite O2 saturations reading 95% and greater throughout. Left up in chair with pt's son present at end of session.  Pt is motivated to improve his mobility and strength. Will continue to follow and progress mobility as he tolerates. Pt would benefit from rehab stay at discharge from mobility progression and ostomy teaching, pt in agreement. He has been to [facility] in the past and would like to go back. Patient will benefit from skilled intervention to address the above impairments. Patients rehabilitation potential is considered to be Good  Factors which may influence rehabilitation potential include:   []         None noted  []         Mental ability/status  [x]         Medical condition  []         Home/family situation and support systems  []         Safety awareness  []         Pain tolerance/management  []         Other:      PLAN :  Recommendations and Planned Interventions:  [x]           Bed Mobility Training             [x]    Neuromuscular Re-Education  [x]           Transfer Training                   []    Orthotic/Prosthetic Training  [x]           Gait Training                         []    Modalities  [x]           Therapeutic Exercises           []    Edema Management/Control  [x]           Therapeutic Activities            [x]    Patient and Family Training/Education  []           Other (comment):    Frequency/Duration: Patient will be followed by physical therapy  5 times a week to address goals. Discharge Recommendations: Robert Russell  Further Equipment Recommendations for Discharge: TBD     SUBJECTIVE:   Patient stated Im winded after that.     OBJECTIVE DATA SUMMARY:   HISTORY:    Past Medical History:   Diagnosis Date    Arthritis     ECG abnormality 4/30/2018    GERD (gastroesophageal reflux disease)     High cholesterol     Hypertension     Other ill-defined conditions(799.89) ~2000    arm tingling to ER, per pt states was not a stroke, but has been on plavix and aggrenox since.   currently on plavix    Sleep apnea with use of continuous positive airway pressure (CPAP)      Past Surgical History:   Procedure Laterality Date    HX CATARACT REMOVAL Bilateral 2008 and 2012    HX HERNIA REPAIR Left     HX ORTHOPAEDIC Right     elbow     TOTAL KNEE ARTHROPLASTY Left 1999    TOTAL KNEE ARTHROPLASTY Right 2011     Prior Level of Function/Home Situation: Was independent. Driving his son to work daily  Personal factors and/or comorbidities impacting plan of care: arthritis, HTN    Home Situation  Home Environment: Private residence  # Steps to Enter: 3  Rails to Enter: Yes  Hand Rails : Right  One/Two Story Residence: Two story (basement with office)  # of Interior Steps: 13  Interior Rails: Left  Living Alone: No  Support Systems: Family member(s)  Patient Expects to be Discharged to[de-identified] Private residence  Current DME Used/Available at Home: None    EXAMINATION/PRESENTATION/DECISION MAKING:   Critical Behavior:  Neurologic State: Alert  Orientation Level: Oriented X4  Cognition: Follows commands, Appropriate decision making, Appropriate safety awareness, Appropriate for age attention/concentration     Hearing: Auditory  Auditory Impairment: None  Hearing Aids/Status: Other (comment)    Range Of Motion:  AROM: Generally decreased, functional (afraid of ripping out stitches from moving)                       Strength:    Strength: Generally decreased, functional                    Tone & Sensation:   Tone: Normal              Sensation: Intact               Coordination:  Coordination: Generally decreased, functional  Vision:      Functional Mobility:  Bed Mobility:  Rolling: Minimum assistance  Supine to Sit: Minimum assistance        Transfers:  Sit to Stand: Minimum assistance;Assist x1;Additional time (elevated bed height)  Stand to Sit: Contact guard assistance                       Balance:   Sitting: Intact  Standing: Impaired; Without support (RW)  Standing - Static: Fair;Constant support  Standing - Dynamic : Fair  Ambulation/Gait Training:  Distance (ft): 60 Feet (ft)  Assistive Device: Gait belt;Walker, rolling  Ambulation - Level of Assistance: Minimal assistance        Gait Abnormalities: Decreased step clearance;Shuffling gait (trunk flexed)        Base of Support: Narrowed     Speed/Mckenzie: Pace decreased (<100 feet/min); Slow;Shuffled  Step Length: Right shortened;Left shortened                    Functional Measure:  Tinetti test:    Sitting Balance: 1  Arises: 1  Attempts to Rise: 1  Immediate Standing Balance: 1  Standing Balance: 1  Nudged: 0  Eyes Closed: 0  Turn 360 Degrees - Continuous/Discontinuous: 0  Turn 360 Degrees - Steady/Unsteady: 1  Sitting Down: 1  Balance Score: 7  Indication of Gait: 1  R Step Length/Height: 0  L Step Length/Height: 0  R Foot Clearance: 1  L Foot Clearance: 1  Step Symmetry: 1  Step Continuity: 0  Path: 0  Trunk: 0  Walking Time: 0  Gait Score: 4  Total Score: 11       Tinetti Test and G-code impairment scale:  Percentage of Impairment CH    0%   CI    1-19% CJ    20-39% CK    40-59% CL    60-79% CM    80-99% CN     100%   Tinetti  Score 0-28 28 23-27 17-22 12-16 6-11 1-5 0       Tinetti Tool Score Risk of Falls  <19 = High Fall Risk  19-24 = Moderate Fall Risk  25-28 = Low Fall Risk  Tinetti ME. Performance-Oriented Assessment of Mobility Problems in Elderly Patients. Cade 66; [id number]. (Scoring Description: PT Bulletin Feb. 10, 1993)    Older adults: Gayle Deshpande et al, 2009; n = 1000 [facility] elderly evaluated with ABC, ARABELLA, ADL, and IADL)  · Mean ARABELLA score for males aged 69-68 years = 26.21(3.40)  · Mean ARABELLA score for females age 69-68 years = 25.16(4.30)  · Mean ARABELLA score for males over 80 years = 23.29(6.02)  · Mean ARABELLA score for females over 80 years = 17.20(8.32)         G codes: In compliance with CMSs Claims Based Outcome Reporting, the following G-code set was chosen for this patient based on their primary functional limitation being treated:     The outcome measure chosen to determine the severity of the functional limitation was the Tinetti with a score of 11/28 which was correlated with the impairment scale. ? Mobility - Walking and Moving Around:     - CURRENT STATUS: CL - 60%-79% impaired, limited or restricted    - GOAL STATUS: CK - 40%-59% impaired, limited or restricted    - D/C STATUS:  ---------------To be determined---------------      Physical Therapy Evaluation Charge Determination   History Examination Presentation Decision-Making   MEDIUM  Complexity : 1-2 comorbidities / personal factors will impact the outcome/ POC  LOW Complexity : 1-2 Standardized tests and measures addressing body structure, function, activity limitation and / or participation in recreation  LOW Complexity : Stable, uncomplicated  Other outcome measures Tinetti  LOW       Based on the above components, the patient evaluation is determined to be of the following complexity level: LOW     Pain:  Pain Scale 1: Numeric (0 - 10)  Pain Intensity 1: 0  Pain Location 1: Abdomen     Pain Description 1: Sore     Activity Tolerance:   Good  Please refer to the flowsheet for vital signs taken during this treatment. After treatment:   [x]         Patient left in no apparent distress sitting up in chair  []         Patient left in no apparent distress in bed  [x]         Call bell left within reach  [x]         Nursing notified  [x]         Caregiver present  []         Bed alarm activated    COMMUNICATION/EDUCATION:   The patients plan of care was discussed with: Registered Nurse. [x]         Fall prevention education was provided and the patient/caregiver indicated understanding. [x]         Patient/family have participated as able in goal setting and plan of care. [x]         Patient/family agree to work toward stated goals and plan of care. []         Patient understands intent and goals of therapy, but is neutral about his/her participation. []         Patient is unable to participate in goal setting and plan of care.     Thank you for this referral.  Makenzie Darnell, PT   Time Calculation: 29 mins

## 2018-04-30 NOTE — WOUND CARE
Ostomy Nurse: 79 y/o CM POD# 2 from reduction and repair of left inguinal hernia w/sigmoid resection and end colostomy secondary to left inguinal hernia w/obstruction and mass involving incarcerated sigmoid colon and hernia. Ostomy Nurse introduced self and services to patient who is A &O x3 currently on PCU. Pt. States he is independent with ADL's and lives with one of his sons who works evening/night shifts. Patient had a room full of family and my meeting with him was brief. I was able to peek at his colostomy. Colostomy: appears pink, moist and budded through pouch. Positive watery brown stool in pouch, unsure of flatus. Patient tolerating clear liquid diet. Plan: Ostomy nurse to change pouch tomorrow and start ostomy education with patient.     Radha Hurtado RN, CWON, zone ph# [phone]

## 2018-04-30 NOTE — PROGRESS NOTES
[address] [place] [address]  [phone]      Cardiology Progress Note      4/30/2018 8:30AM    Admit Date: 4/28/2018    Admit Diagnosis:   Hernia, inguinal, recurrent, with obstruction  Pancreatic mass  inguinal hernia left    Subjective: Minerva Hernandez has no c/o CP, SOB. Starting to eat post surgery. ECG 4/29/18 nonspecific Twave changes slightly improved.       Visit Vitals    /70    Pulse 76    Temp 99.4 °F (37.4 °C)    Resp 18    Ht 6' (1.829 m)    Wt 87.9 kg (193 lb 12.8 oz)    SpO2 96%    BMI 26.28 kg/m2       Current Facility-Administered Medications   Medication Dose Route Frequency    metoprolol (LOPRESSOR) injection 2.5 mg  2.5 mg IntraVENous Q6H    sodium chloride (NS) flush 5-10 mL  5-10 mL IntraVENous Q8H    sodium chloride (NS) flush 5-10 mL  5-10 mL IntraVENous PRN    0.9% sodium chloride infusion  100 mL/hr IntraVENous CONTINUOUS    ondansetron (ZOFRAN) injection 4 mg  4 mg IntraVENous Q4H PRN    piperacillin-tazobactam (ZOSYN) 3.375 g in 0.9% sodium chloride (MBP/ADV) 100 mL  3.375 g IntraVENous Q8H    hydrALAZINE (APRESOLINE) 20 mg/mL injection 10 mg  10 mg IntraVENous Q6H PRN    sodium chloride (NS) flush 5-10 mL  5-10 mL IntraVENous Q8H    sodium chloride (NS) flush 5-10 mL  5-10 mL IntraVENous PRN    morphine injection 2 mg  2 mg IntraVENous Q2H PRN    enoxaparin (LOVENOX) injection 40 mg  40 mg SubCUTAneous DAILY       Objective:      Physical Exam:  General Appearance:  elderly  male in no acute distress  Chest:   diminished   Cardiovascular:  Regular rate and rhythm, no murmur.   Abdomen:   Soft, non-tender, bowel sounds are active.   Extremities: no peripheral edema  Skin:  Warm and dry.     Data Review:   Recent Labs      04/30/18   0418  04/29/18   0358  04/28/18   0834   WBC  10.5  13.3*  14.6*   HGB  12.4  13.3  15.2   HCT  36.1*  38.6  42.5   PLT  203  217  242     Recent Labs      04/30/18   0418  04/29/18 1028  04/29/18   0358  04/28/18   0834   NA  137   --   136  133*   K  3.3*   --   3.8  3.9   CL  104   --   101  97   CO2  24   --   27  28   GLU  121*   --   129*  190*   BUN  25*   --   25*  28*   CREA  1.13   --   1.15  1.24   CA  7.8*   --   8.4*  9.6   MG  1.8   --    --   1.6   ALB  2.6*   --   2.8*  3.5   TBILI  2.3*   --   1.7*  1.6*   SGOT  17   --   18  15   ALT  14   --   14  17   INR   --   1.1   --    --        Recent Labs      04/28/18   0834   TROIQ  <0.04   CPK  32*   CKMB  2.0         Intake/Output Summary (Last 24 hours) at 04/30/18 4358  Last data filed at 04/30/18 0421   Gross per 24 hour   Intake          2011.67 ml   Output              950 ml   Net          1061.67 ml        Telemetry: SR  EKG shows sinus rhythm with RBBB, T wave inversions in lateral leads    Assessment:     Principal Problem:    Hernia, inguinal, recurrent, with obstruction (4/28/2018)    Active Problems:    Pancreatic mass (4/28/2018)      ECG abnormality (4/30/2018)        Plan:     ECG abnormality:  Stable, continue on IV BB and change to home dose BB as soon as taking whole foods  Restart Plavix and start low dose ASA when GS OKs  Plan to have patient f/u with outpt nuclear Lexiscan stress study at the [address] in 3-4 weeks post discharge for further evaluation of ECG changes        Leon Kramer [address] Cardiology    4/30/2018         Patient seen, examined by me personally. Plan discussed as detailed. Agree with note as outlined by  NP. I confirm findings in history and physical exam. No additional findings noted. Agree with plan as outlined above.      [address] Leann Mari MD

## 2018-04-30 NOTE — PROGRESS NOTES
PCU SHIFT NURSING NOTE      Bedside and Verbal shift change report given to Ronni Escoto RN (oncoming nurse) by Airam Mike RN (offgoing nurse). Report included the following information SBAR, Kardex, MAR and Recent Results. Shift Summary:   0725: Bedside and Verbal shift change report given to Richie Walker RN (oncoming nurse) by Ronni Escoto RN (offgoing nurse). Report included the following information SBAR, Kardex, MAR and Recent Results. Admission Date 4/28/2018   Admission Diagnosis Hernia, inguinal, recurrent, with obstruction  Pancreatic mass  inguinal hernia left   Consults IP CONSULT TO GENERAL SURGERY  IP CONSULT TO HOSPITALIST  IP CONSULT TO GASTROENTEROLOGY        Consults   []PT   []OT   []Speech   [x]Case Management      [] Palliative      Cardiac Monitoring Order   [x]Yes   []No     IV drips   []Yes    Drip:                            Dose:  Drip:                            Dose:  Drip:                            Dose:   [x]No     GI Prophylaxis   []Yes   [x]No         DVT Prophylaxis   SCDs:  Sequential Compression Device: Bilateral          Rafa stockings:  Graduated Compression Stockings: Bilateral      [x] Medication   []Contraindicated   []None      Activity Level Activity Level: Bed Rest, Up with Assistance     Activity Assistance: Partial (one person)   Purposeful Rounding every 1-2 hour? [x]Yes   Richardson Score  Total Score: 4   Bed Alarm (If score 3 or >)   []Yes   [] Refused (See signed refusal form in chart)   Trevor Score  Trevor Score: 17   Trevor Score (if score 14 or less)   []PMT consult   []Wound Care consult      []Specialty bed   [] Nutrition consult          Needs prior to discharge:   Home O2 required:    []Yes   [x]No    If yes, how much O2 required?     Other:    Last Bowel Movement: Last Bowel Movement Date: 04/29/18 (ostmy)      Influenza Vaccine Received Flu Vaccine for Current Season (usually Sept-March): Yes        Pneumonia Vaccine           Diet Active Orders   Diet    DIET CLEAR LIQUID      LDAs               Peripheral IV 04/28/18 Right Antecubital (Active)   Site Assessment Clean, dry, & intact 4/29/2018  8:35 PM   Phlebitis Assessment 0 4/29/2018  8:35 PM   Infiltration Assessment 0 4/29/2018  8:35 PM   Dressing Status Clean, dry, & intact 4/29/2018  8:35 PM   Dressing Type Transparent 4/29/2018  8:35 PM   Hub Color/Line Status Pink 4/29/2018  8:35 PM   Action Taken Blood drawn 4/28/2018  8:51 AM   Alcohol Cap Used Yes 4/28/2018  9:10 PM          Colostomy 04/28/18 Lower ; Left Abdomen (Active)   Drainage Color Brown 4/29/2018 11:18 AM   Site Assessment Clean, dry, intact 4/29/2018 11:18 AM   Output (ml) 100 mL 4/29/2018 11:18 AM                Urinary Catheter Urinary Catheter 04/28/18 2- way-Indications for Use: Surgery    Intake & Output   Date 04/29/18 0700 - 04/30/18 0659 04/30/18 0700 - 05/01/18 0659   Shift 3539-2878 1625-6646 24 Hour Total 8994-7641 8575-2918 24 Hour Total   I  N  T  A  K  E   P.O. 120 180 300         P. O. 120 180 300       I.V.  (mL/kg/hr) 100  (0.1) 1611.7 1711.7         Volume (0.9% sodium chloride infusion)  1511.7 1511.7         Volume (piperacillin-tazobactam (ZOSYN) 3.375 g in 0.9% sodium chloride (MBP/ADV) 100 mL) 100 100 200       Shift Total  (mL/kg) 220  (2.5) 1791.7  (20.4) 2011.7  (22.9)      O  U  T  P  U  T   Urine  (mL/kg/hr) 450  (0.4) 350 800         Urine Output (mL) (Urinary Catheter 04/28/18 2- way) 450 350 800       Emesis/NG output 200  200         Output (ml) ([REMOVED] Nasogastric Tube 04/28/18) 200  200       Stool 100  100         Output (ml) (Colostomy 04/28/18 Lower ; Left Abdomen) 100  100       Shift Total  (mL/kg) 750  (8.5) 350  (4) 1100  (12.5)      NET -530 1441.7 911.7      Weight (kg) 87.9 87.9 87.9 87.9 87.9 87.9         Readmission Risk Assessment Tool Score Low Risk            8       Total Score        3 Has Seen PCP in Last 6 Months (Yes=3, No=0)    5 Pt.  Coverage (Medicare=5 , Medicaid, or Self-Pay=4)        Criteria that do not apply:    . Living with Significant Other. Assisted Living. LTAC. SNF.  or   Rehab    Patient Length of Stay (>5 days = 3)    IP Visits Last 12 Months (1-3=4, 4=9, >4=11)    Charlson Comorbidity Score (Age + Comorbid Conditions)       Expected Length of Stay - - -   Actual Length of Stay 2

## 2018-04-30 NOTE — PROGRESS NOTES
Admit Date: 2018    POD 2 Days Post-Op    Procedure:  Procedure(s) with comments:  REDUCED AND REPAIRED LEFT INGUINAL HERNIA WITH SIGMOID RESECTION END COLOSTOMY - REPAIR LEFT INGUINAL HERNIA, WITH POSSIBLE EXPLORATORY LAPAROTOMY    Subjective:     Patient has no new complaints. Tolerating po.  + ostomy function. Objective:     Blood pressure 159/68, pulse 92, temperature 98.4 °F (36.9 °C), resp. rate 16, height 6' (1.829 m), weight 193 lb 12.8 oz (87.9 kg), SpO2 96 %. Temp (24hrs), Av.9 °F (37.2 °C), Min:98.2 °F (36.8 °C), Max:99.4 °F (37.4 °C)      Physical Exam:  GENERAL: alert, cooperative, no distress, appears stated age, LUNG: clear to auscultation bilaterally, HEART: regular rate and rhythm, ABDOMEN: soft, non-tender.  Bowel sounds normal. No masses,  no organomegaly, wound c/d/i, ostomy healthy pink appearing, EXTREMITIES:  extremities normal, atraumatic, no cyanosis or edema    Labs:   Recent Results (from the past 24 hour(s))   EKG, 12 LEAD, INITIAL    Collection Time: 18  2:51 PM   Result Value Ref Range    Ventricular Rate 81 BPM    Atrial Rate 81 BPM    P-R Interval 126 ms    QRS Duration 136 ms    Q-T Interval 408 ms    QTC Calculation (Bezet) 473 ms    Calculated P Axis 17 degrees    Calculated R Axis -95 degrees    Calculated T Axis -36 degrees    Diagnosis       ** Poor data quality, interpretation may be adversely affected    Sinus rhythm with premature supraventricular complexes  Right bundle branch block    Confirmed by Jace Cohen ([id number]) on 2018 9:14:27 AM     CBC WITH AUTOMATED DIFF    Collection Time: 18  4:18 AM   Result Value Ref Range    WBC 10.5 4.1 - 11.1 K/uL    RBC 3.50 (L) 4.10 - 5.70 M/uL    HGB 12.4 12.1 - 17.0 g/dL    HCT 36.1 (L) 36.6 - 50.3 %    .1 (H) 80.0 - 99.0 FL    MCH 35.4 (H) 26.0 - 34.0 PG    MCHC 34.3 30.0 - 36.5 g/dL    RDW 12.8 11.5 - 14.5 %    PLATELET 456 816 - 326 K/uL    MPV 10.2 8.9 - 12.9 FL    NRBC 0.0 0  WBC ABSOLUTE NRBC 0.00 0.00 - 0.01 K/uL    NEUTROPHILS 75 32 - 75 %    LYMPHOCYTES 10 (L) 12 - 49 %    MONOCYTES 14 (H) 5 - 13 %    EOSINOPHILS 1 0 - 7 %    BASOPHILS 0 0 - 1 %    IMMATURE GRANULOCYTES 1 (H) 0.0 - 0.5 %    ABS. NEUTROPHILS 7.9 1.8 - 8.0 K/UL    ABS. LYMPHOCYTES 1.1 0.8 - 3.5 K/UL    ABS. MONOCYTES 1.5 (H) 0.0 - 1.0 K/UL    ABS. EOSINOPHILS 0.1 0.0 - 0.4 K/UL    ABS. BASOPHILS 0.0 0.0 - 0.1 K/UL    ABS. IMM. GRANS. 0.1 (H) 0.00 - 0.04 K/UL    DF AUTOMATED     MAGNESIUM    Collection Time: 04/30/18  4:18 AM   Result Value Ref Range    Magnesium 1.8 1.6 - 2.4 mg/dL   METABOLIC PANEL, COMPREHENSIVE    Collection Time: 04/30/18  4:18 AM   Result Value Ref Range    Sodium 137 136 - 145 mmol/L    Potassium 3.3 (L) 3.5 - 5.1 mmol/L    Chloride 104 97 - 108 mmol/L    CO2 24 21 - 32 mmol/L    Anion gap 9 5 - 15 mmol/L    Glucose 121 (H) 65 - 100 mg/dL    BUN 25 (H) 6 - 20 MG/DL    Creatinine 1.13 0.70 - 1.30 MG/DL    BUN/Creatinine ratio 22 (H) 12 - 20      GFR est AA >60 >60 ml/min/1.73m2    GFR est non-AA >60 >60 ml/min/1.73m2    Calcium 7.8 (L) 8.5 - 10.1 MG/DL    Bilirubin, total 2.3 (H) 0.2 - 1.0 MG/DL    ALT (SGPT) 14 12 - 78 U/L    AST (SGOT) 17 15 - 37 U/L    Alk. phosphatase 51 45 - 117 U/L    Protein, total 5.5 (L) 6.4 - 8.2 g/dL    Albumin 2.6 (L) 3.5 - 5.0 g/dL    Globulin 2.9 2.0 - 4.0 g/dL    A-G Ratio 0.9 (L) 1.1 - 2.2     LIPASE    Collection Time: 04/30/18  4:18 AM   Result Value Ref Range    Lipase 43 (L) 73 - 393 U/L       Data Review images and reports reviewed    Assessment:     Principal Problem:    Hernia, inguinal, recurrent, with obstruction (4/28/2018)    Active Problems:    Pancreatic mass (4/28/2018)      ECG abnormality (4/30/2018)        Plan/Recommendations/Medical Decision Making:     Continue present treatment   Ostomy teaching ongoing  Should be ready for d/c soon, will need SNF short term  PT westonal    Carrington García MD, [facility]

## 2018-05-01 NOTE — PROGRESS NOTES
Consult received. Chart reviewed. Briefly visited w/ pt. Introduced myself and briefly spoke about our team's beng consulted to see them. Son Da Benjamin) at bedside. Son relays he is only child. Pt's GD is son's only child. Her name is Liz Dixon who will be an RN as she is graduating on 05/17/2018. We scheduled family meeting for tomorrow, 05/02/2018,, @ 1100 to discuss goals of care and advance care planning. Full, initial consult note to follow. I was not able to locate his bedside RN for nightshift. I asked other PCU RN staff to relay the info about family meeting. Time appreciated. Should you have questions/concerns or the need for a bedside visit by our team sooner, please do not hesitate to contact us at [phone]). Thank you for providing us w/ the opportunity to be involved in this patient's care. Albert Zavaleta MD  Palliative Care Team      Addendum: d/w Dr. Monica Cervantes last night. At the time of the above note, I was not aware that she had visited w/ pt and his family. Please refer to her consult note. Family meeting still scheduled as noted above but w/ purpose of continuing discussions w/ pt and family as well as to continue establishing therapeutic alliance w/ pt and family.

## 2018-05-01 NOTE — PROGRESS NOTES
Spiritual Care Assessment/Progress Note  [facility]      NAME: Cole Haddad      MRN: [id number]  AGE: 80 y.o. SEX: male  Worship Affiliation: [employer]oration   Language: English     5/1/2018     Total Time (in minutes): 10     Spiritual Assessment begun in [facility] 2 PROGRESSIVE CARE through conversation with:         []Patient        [x] Family    [] Friend(s)        Reason for Consult: Family care, Initial/Spiritual assessment, patient floor, Palliative Care, Initial/Spiritual Assessment     Spiritual beliefs: (Please include comment if needed)     [x] Identifies with a grady tradition:     [x] Supported by a grady community: Brigitte     [] Claims no spiritual orientation:      [] Seeking spiritual identity:           [] Adheres to an individual form of spirituality:      [] Not able to assess:                     Identified resources for coping:      [x] Prayer                               [] Music                  [] Guided Imagery     [x] Family/friends                 [] Pet visits     [] Devotional reading                         [] Unknown     [x] Other:  Sacraments and his grady                                              Interventions offered during this visit: (See comments for more details)    Patient Interventions: Initial visit     Family/Friend(s):  Affirmation of emotions/emotional suffering, Affirmation of grady, Initial Assessment, Prayer (assurance of)     Plan of Care:     [x] Support spiritual and/or cultural needs    [] Support AMD and/or advance care planning process      [] Support grieving process   [] Coordinate Rites and/or Rituals    [] Coordination with community clergy   [] No spiritual needs identified at this time   [x] Detailed Plan of Care below (See Comments)  [] Make referral to Music Therapy  [] Make referral to Pet Therapy     [] Make referral to Addiction services  [] Make referral to [facility]  [] Make referral to Spiritual Care Partner  [] No future visits requested        [] Follow up visits as needed     Comments: Initial visit with patient's son Katherine Kruse and his granddaughter. Son affirmed patient's grady - patient is a member of [employer] and Father Desiree Matos visited patient this morning per son. Pt's grady is very important to him, son said his father is at the [employer] anytime the doors are open. Mr. Louann West was sleeping at this time. I assured son of pastoral care support and presence - patient appears to be well-supported by his paris, other members from [employer] had been to visit patient as well per son. Pastoral care is available to continue to follow as needed. No specific needs noted at this time. [id number]. Visit by: Akin Soto. Filemon Her.  Amrit Sargent MA, [facility]    Lead  Profession Development & Advancement

## 2018-05-01 NOTE — PROGRESS NOTES
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 This in conjunction with multiple lung lesions argues against resectable lesion for cure     Plan:   · As lesion does not appear resectable due to mets and tissue dx in hand off of colonic met, EUS-FNA/B will not add to management  · Consider for oncology and palliative consultation   Will sign off  Subjective:   Discussed with RN events overnight. Feels well, but is concerned re: dx    Complaint Y/N Description   Abdominal Pain n    Hematemesis n    Hematochezia n    Melena n    Constipation n    Diarrhea n    Dyspepsia n    Dysphagia n    Jaundiced n    Nausea/vomiting n      Review of Systems:  Symptom Y/N Comments  Symptom Y/N Comments   Fever/Chills n   Chest Pain n    Cough n   Headaches n    Sputum n   Joint Pain n    SOB/BUI n   Pruritis/Rash n    Tolerating Diet y   Other       Could NOT obtain due to:      Objective:   VITALS:   Last 24hrs VS reviewed since prior progress note. Most recent are:  Visit Vitals    /61    Pulse 73    Temp 98 °F (36.7 °C)    Resp 16    Ht 6' (1.829 m)    Wt 87.9 kg (193 lb 12.8 oz)    SpO2 96%    BMI 26.28 kg/m2       Intake/Output Summary (Last 24 hours) at 18 0940  Last data filed at 18 0519   Gross per 24 hour   Intake              720 ml   Output              675 ml   Net               45 ml     PHYSICAL EXAM:  General: WD, WN. Alert, cooperative, no acute distress    HEENT: NC, Atraumatic. PERRL. Anicteric sclerae. Lungs:  Dull at b/l bases. No Wheezing/Rhonchi/Rales. Heart:  Regular  rhythm,  No murmur/Rub/Gallops  Abdomen: Soft, ND, NT, +BS,  Functioning colostomy  Extremities: No c/c/e  Neurologic:  CN 2-12 gi, A/O X 3.   No acute neurological distress   Psych:   Good insight. Not anxious nor agitated. Lab and Radiology Data Reviewed: (see below)  CHEST CT 5/1/18   FINDINGS: There are several bilateral noncalcified irregularly marginated lower  lung nodule. Those in the bilateral posterior costophrenic angles seen on these  CT Abdomen are now partly obscured by new small pleural effusions. A nodule in  the lower portion of the left upper lobe measures 2.6 x 2.1 cm (image 3-25). These are likely neoplastic/metastatic but could be inflammatory. There is no significant adenopathy. There is no pericardial effusion. There is  coronary artery calcification. There is aortic calcification without aneurysm. Adrenals are not enlarged. Visualized thyroid and lower neck soft tissues are  unremarkable for age. IMPRESSION:   1. Bilateral pulmonary nodules suspicious for metastases. 2. New small pleural effusions. Colon path (prelim) c/w pancreatic adenoCA    Medications Reviewed: (see below)  PMH/SH reviewed - no change compared to H&P  ________________________________________________________________________  Total time spent with patient: 15 minutes   ________________________________________________________________________  Care Plan discussed with:  Patient y   Family     RN               Consultant:  augusta Aguilar MD     Procedures: see electronic medical records for all procedures/Xrays and details which were not copied into this note but were reviewed prior to creation of Plan.       LABS:  Recent Labs      05/01/18   0327  04/30/18   0418   WBC  9.7  10.5   HGB  12.3  12.4   HCT  36.7  36.1*   PLT  184  203     Recent Labs      05/01/18   0327  04/30/18   0418  04/29/18   0358   NA  136  137  136   K  3.3*  3.3*  3.8   CL  102  104  101   CO2  25  24  27   BUN  24*  25*  25*   CREA  1.28  1.13  1.15   GLU  138*  121*  129*   CA  8.2*  7.8*  8.4*   MG  1.9  1.8   --      Recent Labs      05/01/18   0327  04/30/18   0418  04/29/18   0358   SGOT  15  17  18   AP  57  51  55 TP  5.7*  5.5*  5.6*   ALB  2.4*  2.6*  2.8*   GLOB  3.3  2.9  2.8   LPSE   --   43*   --      Recent Labs      04/29/18   1028   INR  1.1   PTP  11.0      No results for input(s): FE, TIBC, PSAT, FERR in the last 72 hours. No results found for: FOL, RBCF  No results for input(s): PH, PCO2, PO2 in the last 72 hours. No results for input(s): CPK, CKMB in the last 72 hours.     No lab exists for component: TROPONINI  Lab Results   Component Value Date/Time    Color DARK YELLOW 04/28/2018 11:08 AM    Appearance CLEAR 04/28/2018 11:08 AM    Specific gravity 1.025 04/28/2018 11:08 AM    Specific gravity 1.017 11/19/2009 02:20 PM    pH (UA) 5.5 04/28/2018 11:08 AM    Protein 30 (A) 04/28/2018 11:08 AM    Glucose NEGATIVE  04/28/2018 11:08 AM    Ketone TRACE (A) 04/28/2018 11:08 AM    Bilirubin NEGATIVE  11/19/2009 02:20 PM    Urobilinogen 1.0 04/28/2018 11:08 AM    Nitrites NEGATIVE  04/28/2018 11:08 AM    Leukocyte Esterase NEGATIVE  04/28/2018 11:08 AM    Epithelial cells FEW 04/28/2018 11:08 AM    Bacteria NEGATIVE  04/28/2018 11:08 AM    WBC 0-4 04/28/2018 11:08 AM    RBC 0-5 04/28/2018 11:08 AM       MEDICATIONS:  Current Facility-Administered Medications   Medication Dose Route Frequency    potassium chloride (KLOR-CON) packet 20 mEq  20 mEq Oral NOW    phenazopyridine (PYRIDIUM) tablet 200 mg  200 mg Oral TID PRN    labetalol (NORMODYNE) tablet 200 mg  200 mg Oral BID    amLODIPine (NORVASC) tablet 10 mg  10 mg Oral DAILY    hydroCHLOROthiazide (HYDRODIURIL) tablet 25 mg  25 mg Oral DAILY    sodium chloride (NS) flush 5-10 mL  5-10 mL IntraVENous Q8H    sodium chloride (NS) flush 5-10 mL  5-10 mL IntraVENous PRN    0.9% sodium chloride infusion  50 mL/hr IntraVENous CONTINUOUS    ondansetron (ZOFRAN) injection 4 mg  4 mg IntraVENous Q4H PRN    piperacillin-tazobactam (ZOSYN) 3.375 g in 0.9% sodium chloride (MBP/ADV) 100 mL  3.375 g IntraVENous Q8H    hydrALAZINE (APRESOLINE) 20 mg/mL injection 10 mg 10 mg IntraVENous Q6H PRN    sodium chloride (NS) flush 5-10 mL  5-10 mL IntraVENous PRN    morphine injection 2 mg  2 mg IntraVENous Q2H PRN    enoxaparin (LOVENOX) injection 40 mg  40 mg SubCUTAneous DAILY

## 2018-05-01 NOTE — WOUND CARE
Ostomy care: Colostomy POD# 3, Education day# 1. Patient was notified today of complex pancreatic mass, multiple pulmonary nodes and colonic mets. Patient tearful and his son Katherine Kruse at bedside. Katherine Kruse is an only child and his father lives with him. Patients wife passed away 6 years ago. Ostomy nurse introduced self to son Katherine Kruse and both he and the patient were open to ostomy care and education today. Patient did not become tearful until after I was done changing his pouch, stating \" I don't want to be a burden to anyone\". Ostomy Education:  reviewed normal and revised anatomy; reviewed educational material including: when and how to empty and change ostomy appliance; normal characteristics of stoma, peristomal skin, and drainage output; changes in diet and importance of hydration; peristomal skin care including crusting; signs and symptoms of diarrhea and constipation; measures to prevent complications above; patient and son receptive asking appropriate questions; written educational materials given to patient for home reference. Stoma:there is some necrotic mucosal slough to stoma and partially wiped easily away with moist cloth to reveal red moist stoma. Stoma is protuberant and measures 44mm oval today. New pouch applied using a one piece cut to fit pouch, stoma ring barrier.     Plan: will meet with pt. and son tomorrow at around Demetria Pham RN, [address] [facility], [facility] ph# [phone]

## 2018-05-01 NOTE — PROGRESS NOTES
Hospitalist Progress Note    NAME: Mookie Sender   :  1926   MRN:  [id number]       Interim Hospital Summary: 80 y.o. male whom presented on 2018 with      Assessment / Plan:  Incarcerated Left Inguinal Hernia with SBO  - s/p reduced and repaired left inguinal hernia with sigmoid resection end colostomy, repair left inguinal hernia with exp lap  - abd distended with (+) bowel sounds, no flatus yet. - tolerating GI lite, c/w Zosyn for now, under Surgery Care. Bladder spasm  - house draining stacey color urine, pt c/o on and off bladder spasm. Pyridium TID PRN    HTN  - continue with Norvasc, HCTZ, & Labetalol  - will consider holding HCTZ if creat continue to trending up  -  use hydralazine prn. Complex Pancreatic Mass  Multiple Pulmonary Nodules  - Chest CT: Bilateral pulmonary nodules suspicious for metastases  - Ca19-9 result pending  - Scheduled for EUS-FNA tomorrow afternoon per GI    Leukocytosis  - WBC trending down to 9.7 from 14.6  - continue with Zosyn per primary team, general surgery  - U/A and CXR are negative. Hyperlipidemia  -  resume statin    Hypokalemia  - replace and monitor    Body mass index is 26.28 kg/(m^2). Code status: Full  Prophylaxis: Lovenox  Recommended Disposition: SNF         Subjective:     Chief Complaint / Reason for Physician Visit  \"I feel like I have to pass urine and gets uncomfortable at times\". Discussed with RN events overnight. Review of Systems:  Symptom Y/N Comments  Symptom Y/N Comments   Fever/Chills n   Chest Pain n    Poor Appetite    Edema     Cough    Abdominal Pain n    Sputum    Joint Pain     SOB/BUI n   Pruritis/Rash     Nausea/vomit n   Tolerating PT/OT     Diarrhea    Tolerating Diet     Constipation    Other  Colostomy in place     Could NOT obtain due to:      Objective:     VITALS:   Last 24hrs VS reviewed since prior progress note.  Most recent are:  Patient Vitals for the past 24 hrs:   Temp Pulse Resp BP SpO2 05/01/18 0725 98 °F (36.7 °C) 73 - 151/61 96 %   05/01/18 0313 99.3 °F (37.4 °C) 70 - 127/61 95 %   04/30/18 2302 99.1 °F (37.3 °C) 74 16 118/64 94 %   04/30/18 1928 98.7 °F (37.1 °C) 75 16 114/57 93 %   04/30/18 1600 - 99 - (!) 216/86 97 %   04/30/18 1208 98.4 °F (36.9 °C) 92 16 159/68 96 %       Intake/Output Summary (Last 24 hours) at 05/01/18 0935  Last data filed at 05/01/18 0519   Gross per 24 hour   Intake              720 ml   Output              675 ml   Net               45 ml        PHYSICAL EXAM:  General: WD, WN. Alert, cooperative, no acute distress    EENT:  EOMI. Anicteric sclerae. MMM  Resp:  Clear in apex with decreased breath sounds at bases, no wheezing or rales. No accessory muscle use  CV:  Regular  rhythm,  No edema  GI:  Soft, distended, Non tender.  +Bowel sounds  Neurologic:  Alert and oriented X 3, normal speech, very [place]   Psych:   Good insight. Not anxious nor agitated  Skin:  No rashes. No jaundice    Reviewed most current lab test results and cultures  YES  Reviewed most current radiology test results   YES  Review and summation of old records today    NO  Reviewed patient's current orders and MAR    YES  PMH/ reviewed - no change compared to H&P  ________________________________________________________________________  Care Plan discussed with:    Comments   Patient y    Family      RN y    Care Manager y    Consultant                       y Multidiciplinary team rounds were held today with , nursing, pharmacist and clinical coordinator. Patient's plan of care was discussed; medications were reviewed and discharge planning was addressed.      ________________________________________________________________________  Total NON critical care TIME:  30   Minutes    Total CRITICAL CARE TIME Spent:   Minutes non procedure based      Comments   >50% of visit spent in counseling and coordination of care ________________________________________________________________________  Franklin Rivera NP     Procedures: see electronic medical records for all procedures/Xrays and details which were not copied into this note but were reviewed prior to creation of Plan. LABS:  I reviewed today's most current labs and imaging studies.   Pertinent labs include:  Recent Labs      05/01/18 0327 04/30/18   0418  04/29/18   0358   WBC  9.7  10.5  13.3*   HGB  12.3  12.4  13.3   HCT  36.7  36.1*  38.6   PLT  184  203  217     Recent Labs      05/01/18 0327 04/30/18   0418  04/29/18   1028  04/29/18   0358   NA  136  137   --   136   K  3.3*  3.3*   --   3.8   CL  102  104   --   101   CO2  25  24   --   27   GLU  138*  121*   --   129*   BUN  24*  25*   --   25*   CREA  1.28  1.13   --   1.15   CA  8.2*  7.8*   --   8.4*   MG  1.9  1.8   --    --    ALB  2.4*  2.6*   --   2.8*   TBILI  1.9*  2.3*   --   1.7*   SGOT  15  17   --   18   ALT  14  14   --   14   INR   --    --   1.1   --        Signed: )Cortez Chase Leader, NP

## 2018-05-01 NOTE — PROGRESS NOTES
Problem: Mobility Impaired (Adult and Pediatric)  Goal: *Acute Goals and Plan of Care (Insert Text)  Physical Therapy Goals  Initiated 4/30/2018  1. Patient will move from supine to sit and sit to supine  and roll side to side in bed with independence within 7 day(s). 2.  Patient will transfer from bed to chair and chair to bed with independence using the least restrictive device within 7 day(s). 3.  Patient will perform sit to stand with independence within 7 day(s). 4.  Patient will ambulate with modified independence for 200 feet with the least restrictive device within 7 day(s). 5.  Patient will ascend/descend 15 stairs with 1 handrail(s) with modified independence within 7 day(s). physical Therapy TREATMENT  Patient: Lucita Tomlinson (70 y.o. male)  Date: 5/1/2018  Diagnosis: Hernia, inguinal, recurrent, with obstruction  Pancreatic mass  inguinal hernia left  abnormal ct Hernia, inguinal, recurrent, with obstruction  Procedure(s) (LRB):  REDUCED AND REPAIRED LEFT INGUINAL HERNIA WITH SIGMOID RESECTION END COLOSTOMY (Left) 3 Days Post-Op  Precautions: Fall, Skin  Chart, physical therapy assessment, plan of care and goals were reviewed. ASSESSMENT:  Patient progressing with activity and agreeable despite expressed concern over a poor diagnosis. He required additional time and physical assist for transfers from bed and a low bedside chair. Initial rosalinda slow with flexed posture but improved with distance and cues. Returned to a bedside chair and left in NAD. This patient was completely independent at baseline with no activity limitations until his recent illness. Recommend discharge to a rehab setting when medically stable.    Progression toward goals:  [x]    Improving appropriately and progressing toward goals  []    Improving slowly and progressing toward goals  []    Not making progress toward goals and plan of care will be adjusted     PLAN:  Patient continues to benefit from skilled intervention to address the above impairments. Continue treatment per established plan of care. Discharge Recommendations:  Rehab  Further Equipment Recommendations for Discharge:  to be determined       SUBJECTIVE:   Patient stated I feel a little better today .     OBJECTIVE DATA SUMMARY:   Critical Behavior:  Neurologic State: Alert  Orientation Level: Oriented X4  Cognition: Follows commands     Functional Mobility Training:  Bed Mobility:                    Transfers:                                   Balance:     Ambulation/Gait Training:  Distance (ft): 120 Feet (ft)  Assistive Device: Gait belt;Walker, rolling  Ambulation - Level of Assistance: Minimal assistance        Gait Abnormalities: Decreased step clearance;Shuffling gait        Base of Support: Narrowed     Speed/Mckenzie: Pace decreased (<100 feet/min)  Step Length: Right shortened;Left shortened        Therapeutic Exercises:     Pain:  Pain Scale 1: Numeric (0 - 10)  Pain Intensity 1: 9  Pain Location 1: Groin  Pain Orientation 1: Left     Pain Intervention(s) 1: Meditation  Activity Tolerance:     Please refer to the flowsheet for vital signs taken during this treatment.   After treatment:   [x]    Patient left in no apparent distress sitting up in chair  []    Patient left in no apparent distress in bed  [x]    Call bell left within reach  [x]    Nursing notified  []    Caregiver present  []    Bed alarm activated    COMMUNICATION/COLLABORATION:   The patients plan of care was discussed with: Registered Nurse    Oc Brunner PT, DPT   Time Calculation: 25 mins

## 2018-05-01 NOTE — PROGRESS NOTES
Occupational Therapy  Orders received and medical record reviewed. Nursing cleared pt for therapy. Upon arrival, pt was seated in chair finishing his lunch. Educated pt on role of OT and activities to be performed while participating in OT Evaluation. Pt declined therapy at this time. He reported that he had gotten news re: CA in several places and that the recommendations were for no treatment. Pt reports that his son lives with him and that he is totally independent, with self care and IADLs/home management at baseline. Encouraged good nutrition and plenty of activity (with assist for safety)  to keep his strength up while in the hospital.  Aborted OT Evaluation and will follow up tomorrow at pt's request. 9 Minutes.

## 2018-05-01 NOTE — PROGRESS NOTES
PCU SHIFT NURSING NOTE      Bedside and Verbal shift change report given to Juan José Malone RN (oncoming nurse) by Marv Almodovar RN (offgoing nurse). Report included the following information SBAR, Kardex, MAR and Recent Results. Shift Summary:   1718: Bedside and Verbal shift change report given to Marzena Ashby RN (oncoming nurse) by Juan José Malone RN (offgoing nurse). Report included the following information SBAR, Kardex, MAR and Recent Results. Admission Date 4/28/2018   Admission Diagnosis Hernia, inguinal, recurrent, with obstruction  Pancreatic mass  inguinal hernia left   Consults IP CONSULT TO GENERAL SURGERY  IP CONSULT TO HOSPITALIST  IP CONSULT TO GASTROENTEROLOGY        Consults   [x]PT   [x]OT   []Speech   [x]Case Management      [] Palliative      Cardiac Monitoring Order   [x]Yes   []No     IV drips   []Yes    Drip:                            Dose:  Drip:                            Dose:  Drip:                            Dose:   [x]No     GI Prophylaxis   []Yes   [x]No         DVT Prophylaxis   SCDs:  Sequential Compression Device: Bilateral          Rafa stockings:  Graduated Compression Stockings: Bilateral      [] Medication   []Contraindicated   []None      Activity Level Activity Level: Up with Assistance     Activity Assistance: Partial (one person)   Purposeful Rounding every 1-2 hour? [x]Yes   Richardson Score  Total Score: 4   Bed Alarm (If score 3 or >)   []Yes   [] Refused (See signed refusal form in chart)   Trevor Score  Trevor Score: 17   Trevor Score (if score 14 or less)   []PMT consult   []Wound Care consult      []Specialty bed   [] Nutrition consult          Needs prior to discharge:   Home O2 required:    []Yes   [x]No    If yes, how much O2 required?     Other:    Last Bowel Movement: Last Bowel Movement Date: 04/29/18 (ostmy)      Influenza Vaccine Received Flu Vaccine for Current Season (usually Sept-March): Yes        Pneumonia Vaccine           Diet Active Orders   Diet    DIET GI LITE (POST SURGICAL)      LDAs               Peripheral IV 04/28/18 Right Antecubital (Active)   Site Assessment Clean, dry, & intact 4/30/2018  8:44 PM   Phlebitis Assessment 0 4/30/2018  8:44 PM   Infiltration Assessment 0 4/30/2018  8:44 PM   Dressing Status Clean, dry, & intact 4/30/2018  8:44 PM   Dressing Type Transparent 4/30/2018  8:44 PM   Hub Color/Line Status Flushed 4/30/2018  8:44 PM   Action Taken Blood drawn 4/28/2018  8:51 AM   Alcohol Cap Used Yes 4/28/2018  9:10 PM          Colostomy 04/28/18 Lower ; Left Abdomen (Active)   Drainage Color Brown 4/30/2018  2:30 PM   Site Assessment Clean, dry, intact 4/30/2018  2:30 PM   Output (ml) 100 mL 4/29/2018 11:18 AM                Urinary Catheter Urinary Catheter 04/28/18 2- way-Indications for Use: Surgery    Intake & Output   Date 04/30/18 0700 - 05/01/18 0659 05/01/18 0700 - 05/02/18 0659   Shift 9777-4229 6111-8622 24 Hour Total 7107-0798 7320-2446 24 Hour Total   I  N  T  A  K  E   P.O. 1080  1080         P. O. 1080  1080       Shift Total  (mL/kg) 1080  (12.3)  1080  (12.3)      O  U  T  P  U  T   Urine  (mL/kg/hr) 325  (0.3)  325         Urine Output (mL) (Urinary Catheter 04/28/18 2- way) 325  325       Shift Total  (mL/kg) 325  (3.7)  325  (3.7)        755      Weight (kg) 87.9 87.9 87.9 87.9 87.9 87.9         Readmission Risk Assessment Tool Score Low Risk            8       Total Score        3 Has Seen PCP in Last 6 Months (Yes=3, No=0)    5 Pt. Coverage (Medicare=5 , Medicaid, or Self-Pay=4)        Criteria that do not apply:    . Living with Significant Other. Assisted Living. LTAC. SNF.  or   Rehab    Patient Length of Stay (>5 days = 3)    IP Visits Last 12 Months (1-3=4, 4=9, >4=11)    Charlson Comorbidity Score (Age + Comorbid Conditions)       Expected Length of Stay 3d 19h   Actual Length of Stay 3

## 2018-05-01 NOTE — CONSULTS
[address] [facility] at [address] [address], [address] [facility] David Melendrez, [address] [facility]  [phone]      Hematology/ Oncology consult note      Reason for consult:     Mr. Freddie Nieves is a gentleman who we have been asked to see by Dr. Manuel Fernandez for metastatic pancreatic cancer. Subjective: Kathryn Arriola is a 80 y.o. who presented to the ED on 4/28/2018 with c/o diffuse abdominal pain with distention x 1 week. He had a known inguinal hernia. A CT scan was completed and a mechanical obstruction of the colon and small bowel by incarcerated hernia was found. There was also a complex mass in the upper abdomen thought to be of pancreatic origin. He was taken to the OR by Dr. Manuel Fernandez for repair and during this time an unsuspecting mass of the sigmoid colon was found. Preliminary biopsy shows adenocarcinoma consisting of pancreatic primary. This afternoon he was sitting up in the chair with his son at the bedside. He said he had been active until the last week when all of these changes happened. He said he has lived a good life, but became tearful and said he wanted to see his grand daughter graduate on May 17th. He also said he does not want to suffer with pain. I explained palliative care and how their team will help with his symptoms prior to him transitioning to hospice. The family appeared at peace with the decision. Review of Systems:  A comprehensive review of systems was negative except for that written in the History of Present Illness. Past Medical History:   Diagnosis Date    Arthritis     ECG abnormality 4/30/2018    GERD (gastroesophageal reflux disease)     High cholesterol     Hypertension     Other ill-defined conditions(079.89) ~2000    arm tingling to ER, per pt states was not a stroke, but has been on plavix and aggrenox since.   currently on plavix    Sleep apnea with use of continuous positive airway pressure (CPAP)      Past Surgical History:   Procedure Laterality Date    HX CATARACT REMOVAL Bilateral 2008 and 2012    HX HERNIA REPAIR Left     HX ORTHOPAEDIC Right     elbow     TOTAL KNEE ARTHROPLASTY Left 1999    TOTAL KNEE ARTHROPLASTY Right 2011      History reviewed. No pertinent family history.   Social History   Substance Use Topics    Smoking status: Never Smoker    Smokeless tobacco: Not on file    Alcohol use No      Current Facility-Administered Medications   Medication Dose Route Frequency Provider Last Rate Last Dose    phenazopyridine (PYRIDIUM) tablet 200 mg  200 mg Oral TID PRN Cortez Singh NP        pravastatin (PRAVACHOL) tablet 40 mg  40 mg Oral QHS Cortez Singh NP        labetalol (NORMODYNE) tablet 200 mg  200 mg Oral BID Jacki Piedra MD   200 mg at 05/01/18 0940    amLODIPine (NORVASC) tablet 10 mg  10 mg Oral DAILY Jacki Piedra MD   10 mg at 05/01/18 0940    hydroCHLOROthiazide (HYDRODIURIL) tablet 25 mg  25 mg Oral DAILY Jacki Piedra MD   25 mg at 05/01/18 0940    sodium chloride (NS) flush 5-10 mL  5-10 mL IntraVENous Q8H Harriet Mcgrath MD   10 mL at 05/01/18 0626    sodium chloride (NS) flush 5-10 mL  5-10 mL IntraVENous PRN Harriet Mcgrath MD        0.9% sodium chloride infusion  50 mL/hr IntraVENous CONTINUOUS Jacki Piedra MD 50 mL/hr at 04/30/18 1438 50 mL/hr at 04/30/18 1438    ondansetron (ZOFRAN) injection 4 mg  4 mg IntraVENous Q4H PRN Harriet Mcgrath MD        piperacillin-tazobactam (ZOSYN) 3.375 g in 0.9% sodium chloride (MBP/ADV) 100 mL  3.375 g IntraVENous Kennedi Coats MD 25 mL/hr at 05/01/18 1209 3.375 g at 05/01/18 1209    hydrALAZINE (APRESOLINE) 20 mg/mL injection 10 mg  10 mg IntraVENous Q6H PRN Charles Portillo MD        sodium chloride (NS) flush 5-10 mL  5-10 mL IntraVENous PRN Harriet Mcgrath MD   10 mL at 05/01/18 0801    morphine injection 2 mg  2 mg IntraVENous Q2H PRN Harriet Mcgrath MD   2 mg at 05/01/18 1226  enoxaparin (LOVENOX) injection 40 mg  40 mg SubCUTAneous DAILY Floyd Colindres MD   40 mg at 05/01/18 0940        Allergies   Allergen Reactions    Aleve [Naproxen Sodium] Swelling     Throat swelling          Objective:     Patient Vitals for the past 8 hrs:   BP Temp Pulse Resp SpO2   05/01/18 1208 138/65 98 °F (36.7 °C) 77 16 96 %   05/01/18 0725 151/61 98 °F (36.7 °C) 73 16 96 %       Lab Results   Component Value Date/Time    WBC 9.7 05/01/2018 03:27 AM    HGB 12.3 05/01/2018 03:27 AM    HCT 36.7 05/01/2018 03:27 AM    PLATELET 385 59/03/9420 03:27 AM    .7 (H) 05/01/2018 03:27 AM       Physical Exam:     General Appearance:  elderly male in no acute distress  Chest:   diminished at the bases  Cardiovascular:  Regular rate and rhythm, no murmur.   Abdomen:   Soft working colostomy   Extremities: no peripheral edema  Skin: new colostomy  Lymphatic: Cervical and supraclavicular normal  Neurological - Alert and oriented by 4              Assessment:     1. Metastatic adenocarcinoma - preliminary pathology consistent with pancreatic primary    > bilateral lung nodules found on chest CT   > complex mass in the upper abdomen thought to be of pancreatic origin  > CA 19-9 elevated - 1222    S/p reduced and repaired left inguinal hernia with sigmoid resection and end colostomy. Exploratory lap - pathology of unexpected mass involving incarcerated sigmoid colon  4/28/2018    Had a discussion with patient, son and grand daughter. Explained treatments for metastatic pancreatic would involve chemotherapy, however due to his age and current health, we would not recommend this. He said he has had a good life, but says he was active until this event last week. Consult with palliative regarding supportive care. Plan:     1. Referral to Palliative - supportive oncology  2. Not a candidate for systemic chemotherapy  3. No follow up needed.  Discussed information with son and grand daughter who is a RN.           Attending Physician Note:     I have reviewed the history, physical examination, assessment and the plan of the care of the patient. I saw the patient with Era Bryant and I participated in the examination and critical decision making. I agree with the note as stated above. Mr. Shauna Batista is a gentleman with metastatic pancreatic carcinoma. He is elderly and has a high burden of disease. My recommendation is to consider palliative care. He is not a great candidate for systemic therapy due to his advanced age.       Signed by: Renzo Arellano MD                     May 2, 2018

## 2018-05-01 NOTE — PROGRESS NOTES
Dr Hiren Ocampo office paged to ask about house removal.  Also case management asking about potential discharge date.

## 2018-05-02 NOTE — PROGRESS NOTES
Bedside shift change report given to Jenaro Rodriguez (oncoming nurse) by Rito Anaya RN (offgoing nurse). Report included the following information SBAR, Kardex, Intake/Output and Recent Results. 0030: Pt still has not voided from 1900 house removal, however 2200 bladder scan showed 0mL. Pt intermittently gets hiccups. Sometimes resolves with sitting upright.    0300: Bladder scan showed a max volume of 272cc, performed 5+ times. Colostomy dry and intact. L groin incision ecchymotic, but staples dry and intact. L groin firm to touch compared to R side. Penis extremely fluid-filled and edematous, pt states this began prior to house removal but is not his baseline. Pt still has not voided. Requested to be placed on CPAP at this time. 4760: Unable to clearly locate bladder for bladder scan, no clear reading. Pt's penile shaft completely fluid-filled and edematous. Not willing to straight cath with penis in it's current state. Paged Dr. Allie Clifton for possible urology consult. Pt has no bladder discomfort at this time. 0700: No return call from MD.    0830: Pt feeling urge to void with no success, repeat bladder scan with different machine showing 650cc. Paged Dr. Allie Clifton. 2840: Answering service called back to state they paged wrong MD. Dr. Tucker Forbes on call for Dr. Allie Clifton at this time, will resend page. 1000: After meeting with Dr. Tucker Forbes and ENRIQUE Andersen at bedside, agreed on attempt to reinsert house and to consult urology if there were any issues with insertion. Sterile technique used with a closed system 18fr Coude catheter, inserted on first attempt to hub with no issues, draining clear stacey urine. Secured to thigh, tubing clipped to bed, no looping present. Pt states he felt immediate relief with the catheter draining at this time. 1255: Pt asked about lunch tray. Called to confirm it is on it's way.

## 2018-05-02 NOTE — PROGRESS NOTES
Hospitalist Progress Note    NAME: Terrence Scott   :  1926   MRN:  [id number]       Interim Hospital Summary: 80 y.o. male whom presented on 2018 with      Assessment / Plan:  Incarcerated Left Inguinal Hernia with SBO  - s/p reduced and repaired left inguinal hernia with sigmoid resection end colostomy, repair left inguinal hernia with exp lap  - abd distended with (+) bowel sounds, no flatus yet. - tolerating GI lite, IV Zosyn for per surgery, primary team    Urinary retention  BPH  - unable to void after d/c house yesterday. Reinsert the house and leave it in.  follow up as outpatient. If unable to insert the house staff by nursing staff then consult   - start Flomax daily for now     HTN  - continue with Norvasc, HCTZ, & Labetalol  - will consider holding HCTZ if creat continue to trending up  -  use hydralazine prn.     Complex Pancreatic Mass  Metastatic adenocarcinoma  - Chest CT: Bilateral pulmonary nodules suspicious for metastases  - Ca19-9 very high 1222; heme/onc following. Poor prognosis. Appreciate palliative team input  - appreciate GI input: lesion not resectable. No further procedure planned at this time     Leukocytosis  - WBC trending down to 9.7 from 14.6  - continue with Zosyn per primary team, general surgery  - U/A and CXR are negative.     Hyperlipidemia  -  resume statin     Hypokalemia  - replace and monitor     Body mass index is 26.28 kg/(m^2). Code status: Full  Prophylaxis: Lovenox  Recommended Disposition: SNF       Subjective:     Chief Complaint / Reason for Physician Visit  \"I feel like I need to pee but I can't\". Discussed with RN events overnight.      Review of Systems:  Symptom Y/N Comments  Symptom Y/N Comments   Fever/Chills n   Chest Pain n    Poor Appetite    Edema     Cough    Abdominal Pain     Sputum    Joint Pain     SOB/BUI n   Pruritis/Rash     Nausea/vomit n   Tolerating PT/OT     Diarrhea    Tolerating Diet     Constipation    Other Could NOT obtain due to:      Objective:     VITALS:   Last 24hrs VS reviewed since prior progress note. Most recent are:  Patient Vitals for the past 24 hrs:   Temp Pulse Resp BP SpO2   05/02/18 0732 98.5 °F (36.9 °C) 71 17 128/63 95 %   05/02/18 0308 98.6 °F (37 °C) 69 18 134/65 93 %   05/01/18 2326 98.7 °F (37.1 °C) 75 17 133/49 97 %   05/01/18 1924 99 °F (37.2 °C) 70 16 144/72 98 %   05/01/18 1559 98.2 °F (36.8 °C) 73 16 133/54 96 %   05/01/18 1208 98 °F (36.7 °C) 77 16 138/65 96 %       Intake/Output Summary (Last 24 hours) at 05/02/18 0954  Last data filed at 05/02/18 0732   Gross per 24 hour   Intake          2366.67 ml   Output             1075 ml   Net          1291.67 ml        PHYSICAL EXAM:  General: Ill appearing. Alert, cooperative, no acute distress    EENT:  EOMI. Anicteric sclerae. MMM  Resp:  Clear in apex with decreased breath sounds at bases. no wheezing or rales. No accessory muscle use  CV:  Regular  rhythm,  No dependent pitting edema  GI:  Soft, Non distended, Non tender.  +Bowel sounds  Neurologic:  Alert and oriented X 3, normal speech,   Psych:   Good insight. Not anxious nor agitated  Skin:  No rashes. No jaundice    Reviewed most current lab test results and cultures  YES  Reviewed most current radiology test results   YES  Review and summation of old records today    NO  Reviewed patient's current orders and MAR    YES  PMH/ reviewed - no change compared to H&P  ________________________________________________________________________  Care Plan discussed with:    Comments   Patient y    Family      RN y    Care Manager y    Consultant                       y Multidiciplinary team rounds were held today with , nursing, pharmacist and clinical coordinator. Patient's plan of care was discussed; medications were reviewed and discharge planning was addressed.      ________________________________________________________________________  Total NON critical care TIME:  30 Minutes    Total CRITICAL CARE TIME Spent:   Minutes non procedure based      Comments   >50% of visit spent in counseling and coordination of care     ________________________________________________________________________  Lillie Guillen NP     Procedures: see electronic medical records for all procedures/Xrays and details which were not copied into this note but were reviewed prior to creation of Plan. LABS:  I reviewed today's most current labs and imaging studies.   Pertinent labs include:  Recent Labs      05/01/18 0327 04/30/18   0418   WBC  9.7  10.5   HGB  12.3  12.4   HCT  36.7  36.1*   PLT  184  203     Recent Labs      05/02/18   0313  05/01/18 0327  04/30/18 0418  04/29/18   1028   NA  136  136  137   --    K  3.3*  3.3*  3.3*   --    CL  101  102  104   --    CO2  25  25  24   --    GLU  136*  138*  121*   --    BUN  24*  24*  25*   --    CREA  1.23  1.28  1.13   --    CA  8.3*  8.2*  7.8*   --    MG  1.7  1.9  1.8   --    ALB   --   2.4*  2.6*   --    TBILI   --   1.9*  2.3*   --    SGOT   --   15  17   --    ALT   --   14  14   --    INR   --    --    --   1.1       Signed: )Cortez Singh NP

## 2018-05-02 NOTE — PROGRESS NOTES
Problem: Mobility Impaired (Adult and Pediatric)  Goal: *Acute Goals and Plan of Care (Insert Text)  Physical Therapy Goals  Initiated 4/30/2018  1. Patient will move from supine to sit and sit to supine  and roll side to side in bed with independence within 7 day(s). 2.  Patient will transfer from bed to chair and chair to bed with independence using the least restrictive device within 7 day(s). 3.  Patient will perform sit to stand with independence within 7 day(s). 4.  Patient will ambulate with modified independence for 200 feet with the least restrictive device within 7 day(s). 5.  Patient will ascend/descend 15 stairs with 1 handrail(s) with modified independence within 7 day(s). physical Therapy TREATMENT  Patient: Renee Carrion (50 y.o. male)  Date: 5/2/2018  Diagnosis: Hernia, inguinal, recurrent, with obstruction  Pancreatic mass  inguinal hernia left  abnormal ct Hernia, inguinal, recurrent, with obstruction  Procedure(s) (LRB):  REDUCED AND REPAIRED LEFT INGUINAL HERNIA WITH SIGMOID RESECTION END COLOSTOMY (Left) 4 Days Post-Op  Precautions: Fall, Skin  Chart, physical therapy assessment, plan of care and goals were reviewed. ASSESSMENT:  Patient remains motivated and agreeable to intervention after multiple attempts today. Utilized RW for gait stability with noted shuffling steps, narrow CATHI, tendency towards flexed posture, and scapular elevation. He is very receptive to cues and gait quality would improve until he fatigued and required cues again. He remains challenged with sit to stand transfers and gait endurance where he was completely independent prior to admission. He has multiple factors that contribute to his medical complexity but has good home support and a high prior level of function. He remains at risk of falls and needs continued medical management for is ostomy, urology, and CA sx.  Highly recommend discharge to an IP rehab setting when medically stable. Progression toward goals:  [x]    Improving appropriately and progressing toward goals  []    Improving slowly and progressing toward goals  []    Not making progress toward goals and plan of care will be adjusted     PLAN:  Patient continues to benefit from skilled intervention to address the above impairments. Continue treatment per established plan of care. Discharge Recommendations:  Inpatient Rehab  Further Equipment Recommendations for Discharge:  to be determined       SUBJECTIVE:   Patient stated I have had a lot going on today.     OBJECTIVE DATA SUMMARY:   Critical Behavior:  Neurologic State: Alert, Appropriate for age  Orientation Level: Appropriate for age, Oriented X4  Cognition: Appropriate decision making, Appropriate for age attention/concentration, Follows commands  Safety/Judgement: Awareness of environment  Functional Mobility Training:  Bed Mobility:  Rolling: Minimum assistance  Supine to Sit: Minimum assistance     Scooting: Independent        Transfers:  Sit to Stand: Contact guard assistance; Additional time  Stand to Sit: Contact guard assistance                             Balance:  Sitting: Impaired  Sitting - Static: Good (unsupported)  Sitting - Dynamic: Fair (occasional)  Standing: Impaired  Standing - Static: Constant support  Standing - Dynamic : Fair  Ambulation/Gait Training:  Distance (ft): 120 Feet (ft)  Assistive Device: Gait belt;Walker, rolling  Ambulation - Level of Assistance: Minimal assistance;Contact guard assistance        Gait Abnormalities: Decreased step clearance        Base of Support: Narrowed     Speed/Rosalinda: Slow;Shuffled  Step Length: Right shortened;Left shortened   Slow rosalinda and frequent cues for posture  Neuro Re-Education:    Therapeutic Exercises:     Pain:  Pain Scale 1: Numeric (0 - 10)  Pain Intensity 1: 0              Activity Tolerance:   HR 86 BPM post activity.   Noted BUI but HR stable and SpO2 97% on RA  Please refer to the flowsheet for vital signs taken during this treatment.   After treatment:   []    Patient left in no apparent distress sitting up in chair  [x]    Patient left in no apparent distress in bed  [x]    Call bell left within reach  [x]    Nursing notified  []    Caregiver present  []    Bed alarm activated    COMMUNICATION/COLLABORATION:   The patients plan of care was discussed with: Registered Nurse    Enoc Beck, PT, DPT   Time Calculation: 26 mins

## 2018-05-02 NOTE — PROGRESS NOTES
*late entry*  Admit Date: 2018    POD 3 Days Post-Op    Procedure:  Procedure(s) with comments:  REDUCED AND REPAIRED LEFT INGUINAL HERNIA WITH SIGMOID RESECTION END COLOSTOMY - REPAIR LEFT INGUINAL HERNIA, WITH POSSIBLE EXPLORATORY LAPAROTOMY    Subjective:     Patient has no new complaints. Tolerating po.  + ostomy function. Objective:     Blood pressure 128/63, pulse 71, temperature 98.5 °F (36.9 °C), resp. rate 17, height 6' (1.829 m), weight 193 lb 12.8 oz (87.9 kg), SpO2 95 %. Temp (24hrs), Av.5 °F (36.9 °C), Min:98 °F (36.7 °C), Max:99 °F (37.2 °C)      Physical Exam:  GENERAL: alert, cooperative, no distress, appears stated age, LUNG: clear to auscultation bilaterally, HEART: regular rate and rhythm, ABDOMEN: soft, non-tender.  Bowel sounds normal. No masses,  no organomegaly, wound c/d/i, ostomy healthy pink appearing, EXTREMITIES:  extremities normal, atraumatic, no cyanosis or edema    Labs:   Recent Results (from the past 24 hour(s))   METABOLIC PANEL, BASIC    Collection Time: 18  3:13 AM   Result Value Ref Range    Sodium 136 136 - 145 mmol/L    Potassium 3.3 (L) 3.5 - 5.1 mmol/L    Chloride 101 97 - 108 mmol/L    CO2 25 21 - 32 mmol/L    Anion gap 10 5 - 15 mmol/L    Glucose 136 (H) 65 - 100 mg/dL    BUN 24 (H) 6 - 20 MG/DL    Creatinine 1.23 0.70 - 1.30 MG/DL    BUN/Creatinine ratio 20 12 - 20      GFR est AA >60 >60 ml/min/1.73m2    GFR est non-AA 55 (L) >60 ml/min/1.73m2    Calcium 8.3 (L) 8.5 - 10.1 MG/DL   MAGNESIUM    Collection Time: 18  3:13 AM   Result Value Ref Range    Magnesium 1.7 1.6 - 2.4 mg/dL       Data Review images and reports reviewed    Assessment:     Principal Problem:    Hernia, inguinal, recurrent, with obstruction (2018)    Active Problems:    Pancreatic mass (2018)      ECG abnormality (2018)        Plan/Recommendations/Medical Decision Making:     Continue present treatment   Ostomy teaching ongoing  Path shows colonic mass is adenocarcinoma, not arising in colon, c/w pancreatic primary. Chest CT shows diffuse metastatic pattern. Oncology and Palliative consults placed. No role for aggressive treatment in this unfortunate setting. Devante Contreras.  Maite Godinez MD, [facility]

## 2018-05-02 NOTE — CONSULTS
Palliative Medicine Consult  Gustavo: [phone] ([phone])    Patient Name: Willian Harris  YOB: 1926    Date of Initial Consult: 5/1/18  Reason for Consult: care decisions  Requesting Provider: Dr. Bhavik Blackwell  Primary Care Physician: PROVIDER UNKNOWN     SUMMARY:   Willian Harris is a 80 y.o. with a past history of arthritis, sleep apnea, GERD, inguinal hernia repair 40 years ago, who was admitted on 4/28/2018 from home with a diagnosis of nausea/vomiting/abdominal pain/hernia. Current medical issues leading to Palliative Medicine involvement include: Patient had surgery for incarcerated L inguinal hernia with SBO, s/p sigmoid resection, end colostomy -- preliminary path + pancreatic cancer. Pancreatic mass and bilateral pulmonary nodules noted on CT Scan. Prior to admission patient was independent in all ADLs and still driving. Patient's only son Francois Jaimes [facility]) lives locally. Patient also has a granddaughter who is about to finish RN training with [employer]. PALLIATIVE DIAGNOSES:   1. New diagnosis of metastatic pancreatic cancer. 2. POD #3 s/p repair incarcerated inguinal hernia/sigmoid resection/end colostomy  3. Post op abdominal pain, well controlled  4. Hearing impaired       PLAN:     1. Case discussed with Erik Lopez from oncology team.    2. Introduced palliative Medicine services. Son, granddaughter at bedside also. 3. Patient has just heard news of pancreatic cancer today. He seems to be coping adaptively/ processing news. 4. Patient expressed concern about developing pain (has had two friends with pancreatic cancer) -- reassurance provided about his care teams ability to proactively manage symptoms. (palliative clinic can help with this)  5. We talked about first steps of recovery from this surgery, expectation that with his previous good health, hoping for good recovery at rehab and his ability to return home.    6. Communicated plan of care with: Palliative IDT    Discussed with Dr. Darlyn Brooks, who will be covering rest of week. He has plan in place to follow up with family tomorrow 11am to see if other questions, discuss care goals if they are interested, and to arrange outpatient follow up in our palliative clinic.  (although patient might decide to follow up at the [facility], he hasn't decided yet. )       GOALS OF CARE / TREATMENT PREFERENCES:     GOALS OF CARE:  Patient/Health Care Proxy Stated Goals: Rehabilitation (goal right now is to rehab from recent surgery, then reassess. )      TREATMENT PREFERENCES:   Code Status: Full Code   (not discussed yet)   Advance Care Planning:  Advance Care Planning 4/28/2018   Patient's Healthcare Decision Maker is: Named in scanned ACP document   Primary Decision Maker Name [facility]    Primary Decision Maker Phone Number [phone]   Primary Decision Maker Relationship to Patient Adult child   Confirm Advance Directive -   Patient Would Like to Complete Advance Directive -       Medical Interventions: Limited additional interventions   Other Instructions: Other:    As far as possible, the palliative care team has discussed with patient / health care proxy about goals of care / treatment preferences for patient. HISTORY:     History obtained from: patient    CHIEF COMPLAINT: admitted with nausea, vomiting    HPI/SUBJECTIVE:    The patient is:   [x] Verbal and participatory  [] Non-participatory due to:     80year old male, previously in very good health, was admitted with nausea, vomiting and abdominal pain. He was found to have incarcerated inguinal hernia.   This had been repaired once many years ago, but was still ongoing problem-- patient always able to reduce hernia and hadn't really caused trouble before this admission        Clinical Pain Assessment (nonverbal scale for severity on nonverbal patients):   Clinical Pain Assessment  Severity: 0          Duration: for how long has pt been experiencing pain (e.g., 2 days, 1 month, years)  Frequency: how often pain is an issue (e.g., several times per day, once every few days, constant)     FUNCTIONAL ASSESSMENT:     Palliative Performance Scale (PPS):  PPS: 50       PSYCHOSOCIAL/SPIRITUAL SCREENING:     Palliative IDT has assessed this patient for cultural preferences / practices and a referral made as appropriate to needs (Cultural Services, Patient Advocacy, Ethics, etc.)    Advance Care Planning:  Advance Care Planning 4/28/2018   Patient's Healthcare Decision Maker is: Named in scanned ACP document   Primary Decision Maker Name [facility]    Primary Decision Maker Phone Number [phone]   Primary Decision Maker Relationship to Patient Adult child   Confirm Advance Directive -   Patient Would Like to Complete Advance Directive -       Any spiritual / Religion concerns:  [] Yes /  [x] No    Caregiver Burnout:  [] Yes /  [x] No /  [] No Caregiver Present      Anticipatory grief assessment:   [x] Normal  / [] Maladaptive       ESAS Anxiety: Anxiety: 0    ESAS Depression: Depression: 0        REVIEW OF SYSTEMS:     Positive and pertinent negative findings in ROS are noted above in HPI. The following systems were [x] reviewed / [] unable to be reviewed as noted in HPI  Other findings are noted below. Systems: constitutional, ears/nose/mouth/throat, respiratory, gastrointestinal, genitourinary, musculoskeletal, integumentary, neurologic, psychiatric, endocrine. Positive findings noted below. Modified ESAS Completed by: provider   Fatigue: 4 Drowsiness: 0   Depression: 0 Pain: 0   Anxiety: 0 Nausea: 0   Anorexia: 4 Dyspnea: 0     Constipation: No              PHYSICAL EXAM:     From RN flowsheet:  Wt Readings from Last 3 Encounters:   04/28/18 87.9 kg (193 lb 12.8 oz)   02/27/14 91.6 kg (202 lb)     Blood pressure 144/72, pulse 70, temperature 99 °F (37.2 °C), resp. rate 16, height 6' (1.829 m), weight 87.9 kg (193 lb 12.8 oz), SpO2 98 %.     Pain Scale 1: Numeric (0 - 10)  Pain Intensity 1: 5  Pain Onset 1: post op  Pain Location 1: Groin  Pain Orientation 1: Left  Pain Description 1: Aching  Pain Intervention(s) 1: Medication (see MAR)  Last bowel movement, if known:     Constitutional: patient is awake, alert NAD  Eyes: pupils equal, anicteric  Hard of hearing  Pleasant, engaging, fluent speech  Normal affect  No respiratory distress  Insight intact     HISTORY:     Principal Problem:    Hernia, inguinal, recurrent, with obstruction (4/28/2018)    Active Problems:    Pancreatic mass (4/28/2018)      ECG abnormality (4/30/2018)      Past Medical History:   Diagnosis Date    Arthritis     ECG abnormality 4/30/2018    GERD (gastroesophageal reflux disease)     High cholesterol     Hypertension     Other ill-defined conditions(449.89) ~2000    arm tingling to ER, per pt states was not a stroke, but has been on plavix and aggrenox since. currently on plavix    Sleep apnea with use of continuous positive airway pressure (CPAP)       Past Surgical History:   Procedure Laterality Date    HX CATARACT REMOVAL Bilateral 2008 and 2012    HX HERNIA REPAIR Left     HX ORTHOPAEDIC Right     elbow     TOTAL KNEE ARTHROPLASTY Left 1999    TOTAL KNEE ARTHROPLASTY Right 2011      History reviewed. No pertinent family history. History reviewed, no pertinent family history.   Social History   Substance Use Topics    Smoking status: Never Smoker    Smokeless tobacco: Not on file    Alcohol use No     Allergies   Allergen Reactions    Aleve [Naproxen Sodium] Swelling     Throat swelling      Current Facility-Administered Medications   Medication Dose Route Frequency    phenazopyridine (PYRIDIUM) tablet 200 mg  200 mg Oral TID PRN    pravastatin (PRAVACHOL) tablet 40 mg  40 mg Oral QHS    labetalol (NORMODYNE) tablet 200 mg  200 mg Oral BID    amLODIPine (NORVASC) tablet 10 mg  10 mg Oral DAILY    hydroCHLOROthiazide (HYDRODIURIL) tablet 25 mg  25 mg Oral DAILY    sodium chloride (NS) flush 5-10 mL  5-10 mL IntraVENous Q8H    sodium chloride (NS) flush 5-10 mL  5-10 mL IntraVENous PRN    0.9% sodium chloride infusion  50 mL/hr IntraVENous CONTINUOUS    ondansetron (ZOFRAN) injection 4 mg  4 mg IntraVENous Q4H PRN    piperacillin-tazobactam (ZOSYN) 3.375 g in 0.9% sodium chloride (MBP/ADV) 100 mL  3.375 g IntraVENous Q8H    hydrALAZINE (APRESOLINE) 20 mg/mL injection 10 mg  10 mg IntraVENous Q6H PRN    sodium chloride (NS) flush 5-10 mL  5-10 mL IntraVENous PRN    morphine injection 2 mg  2 mg IntraVENous Q2H PRN    enoxaparin (LOVENOX) injection 40 mg  40 mg SubCUTAneous DAILY          LAB AND IMAGING FINDINGS:     Lab Results   Component Value Date/Time    WBC 9.7 05/01/2018 03:27 AM    HGB 12.3 05/01/2018 03:27 AM    PLATELET 543 98/84/0409 03:27 AM     Lab Results   Component Value Date/Time    Sodium 136 05/01/2018 03:27 AM    Potassium 3.3 (L) 05/01/2018 03:27 AM    Chloride 102 05/01/2018 03:27 AM    CO2 25 05/01/2018 03:27 AM    BUN 24 (H) 05/01/2018 03:27 AM    Creatinine 1.28 05/01/2018 03:27 AM    Calcium 8.2 (L) 05/01/2018 03:27 AM    Magnesium 1.9 05/01/2018 03:27 AM      Lab Results   Component Value Date/Time    AST (SGOT) 15 05/01/2018 03:27 AM    Alk.  phosphatase 57 05/01/2018 03:27 AM    Protein, total 5.7 (L) 05/01/2018 03:27 AM    Albumin 2.4 (L) 05/01/2018 03:27 AM    Globulin 3.3 05/01/2018 03:27 AM     Lab Results   Component Value Date/Time    INR 1.1 04/29/2018 10:28 AM    Prothrombin time 11.0 04/29/2018 10:28 AM    aPTT 25.4 11/19/2009 02:10 PM      No results found for: IRON, FE, TIBC, IBCT, PSAT, FERR   No results found for: PH, PCO2, PO2  No components found for: [place]   Lab Results   Component Value Date/Time    CK 32 (L) 04/28/2018 08:34 AM    CK - MB 2.0 04/28/2018 08:34 AM                Total time:   Counseling / coordination time, spent as noted above:   > 50% counseling / coordination?:     Prolonged service was provided for  []30 min   []75 min in face to face time in the presence of the patient, spent as noted above. Time Start:   Time End:   Note: this can only be billed with 25574 (initial) or 03223 (follow up). If multiple start / stop times, list each separately.

## 2018-05-02 NOTE — PROGRESS NOTES
Documented on May 2, 2018:    Patient anointed with the Renetta Cardona 34 by Rev. Felice Nunn on 5/1/18  [facility], Staff [address]    [address] Paging Service  [phone])

## 2018-05-02 NOTE — PROGRESS NOTES
Problem: Self Care Deficits Care Plan (Adult)  Goal: *Acute Goals and Plan of Care (Insert Text)  Occupational Therapy Goals  Initiated 5/2/2018  1. Patient will perform grooming in s tanding with supervision/set-up within 7 day(s). 2.  Patient will perform lower body dressing with minimal assistance/contact guard assist, using adaptive aids, prn within 7 day(s). 3.  Patient will perform simple home management with minimal assistance/contact guard assist, using adaptive aids, prn within 7 day(s). 4.  Patient will perform toilet transfers with supervision/set-up within 7 day(s). 5.  Patient will perform all aspects of toileting with minimal assistance/contact guard assist within 7 day(s). 6.  Patient will participate in upper extremity therapeutic exercise/activities with supervision/set-up for 10 minutes within 7 day(s). 7.  Patient will utilize energy conservation techniques during functional activities with minimal verbal cues within 7 day(s). Occupational Therapy EVALUATION  Patient: Kathryn Arriola (77 y.o. male)  Date: 5/2/2018  Primary Diagnosis: Hernia, inguinal, recurrent, with obstruction  Pancreatic mass  inguinal hernia left  abnormal ct  Procedure(s) (LRB):  REDUCED AND REPAIRED LEFT INGUINAL HERNIA WITH SIGMOID RESECTION END COLOSTOMY (Left) 4 Days Post-Op   Precautions:   Fall, Skin    ASSESSMENT :  Based on the objective data described below, the patient presents with new ostomy due to recent surgery (education initiated by nursing) and new CA diagnosis,   baseline decreased ROM R shoulder (hx rotator cuff sx), decreased balance and decreased functional reach to feet, generalized weakness (reports significantly decreased strength from his baseline) and decreased endurance impairing independence in adls/IADLs and functional mobility. Pt was independent in adls and IADLs PTA and active at home ; He is now functioning well below his baseline.    Since pt has been so independent PTA, active and and leading a fulfilling lifestyle,  Recommend that pt be discharged to intensive inpatient rehab to facilitate full rehab potential to increase independence and safety for returning home with family, and have appropriate medical management as he recovers from his surgery . Patient will benefit from skilled intervention to address the above impairments. Patients rehabilitation potential is considered to be Good  Factors which may influence rehabilitation potential include:   []             None noted  []             Mental ability/status  [x]             Medical condition  []             Home/family situation and support systems  []             Safety awareness  []             Pain tolerance/management  []             Other:      PLAN :  Recommendations and Planned Interventions:  [x]               Self Care Training                  [x]        Therapeutic Activities  [x]               Functional Mobility Training    []        Cognitive Retraining  [x]               Therapeutic Exercises           [x]        Endurance Activities  [x]               Balance Training                   []        Neuromuscular Re-Education  []               Visual/Perceptual Training     [x]   Home Safety Training  [x]               Patient Education                 [x]        Family Training/Education  []               Other (comment):    Frequency/Duration: Patient will be followed by occupational therapy 4 times a week to address goals. Discharge Recommendations: Inpatient Rehab  Further Equipment Recommendations for Discharge: would benefit from adaptive aids for lower body adls. SUBJECTIVE:   Patient stated Oh, much weaker now.     OBJECTIVE DATA SUMMARY:   HISTORY:   Past Medical History:   Diagnosis Date    Arthritis     ECG abnormality 4/30/2018    GERD (gastroesophageal reflux disease)     High cholesterol     Hypertension     Other ill-defined conditions(799.89) ~2000    arm tingling to ER, per pt states was not a stroke, but has been on plavix and aggrenox since. currently on plavix    Sleep apnea with use of continuous positive airway pressure (CPAP)      Past Surgical History:   Procedure Laterality Date    HX CATARACT REMOVAL Bilateral 2008 and 2012    HX HERNIA REPAIR Left     HX ORTHOPAEDIC Right     elbow     TOTAL KNEE ARTHROPLASTY Left 1999    TOTAL KNEE ARTHROPLASTY Right 2011       Prior Level of Function/Environment/Context: Pt lives with his son. He retired from car sales at age 80. Pt is independent in adls and IADLs-no history of falls. Pt is active at home and keeps busy with projects around the house. Pt cared for his wife for 4 years,  who had a stroke--they have accessible bathrooms. Occupations in which the patient is/was successful, what are the barriers preventing that success: medical condition impairs independence  Performance Patterns (routines, roles, habits, and rituals):   Personal Interests and/or values: worked until retiring at age 80 and keeps busy at home with projects  Is active  Expanded or extensive additional review of patient history: Hx of B knee surgery, hx R rotator cuff surgery    Home Situation  Home Environment: Private residence  # Steps to Enter: 3  Rails to Enter: Yes  Hand Rails : Right  One/Two Story Residence: Two story (basement with office)  # of Interior Steps: 15  Interior Rails: Left  Living Alone: No  Support Systems: Family member(s)  Patient Expects to be Discharged to[de-identified] Private residence  Current DME Used/Available at Home: Adaptive dressing aides, Grab bars  Tub or Shower Type: Shower (long shoe horn)  []  Right hand dominant   []  Left hand dominant    EXAMINATION OF PERFORMANCE DEFICITS:  Cognitive/Behavioral Status:  Neurologic State: Alert; Appropriate for age  Orientation Level: Appropriate for age;Oriented X4  Cognition: Appropriate decision making; Appropriate for age attention/concentration; Follows commands  Perception: Appears intact  Perseveration: No perseveration noted  Safety/Judgement: Awareness of environment    Skin: new ostomy, house , IV, skin appears dry    Edema: none observed    Hearing: Auditory  Auditory Impairment: Hard of hearing, bilateral  Hearing Aids/Status: At bedside    Vision/Perceptual:    Tracking: Able to track stimulus in all quadrants w/o difficulty                 Diplopia: No         Corrective Lenses: Reading glasses    Range of Motion:  BUEs:    AROM: Generally decreased, functional (RUE impaired to 90 flex shoulder due to rotator cuff repair)                         Strength:  BUEs:    Strength: Generally decreased, functional (pt reports that he is \"much\" weaker than his basleine)                Coordination:  Coordination: Generally decreased, functional (small short steps during functional mobility using RW)  Fine Motor Skills-Upper: Left Intact; Right Intact    Gross Motor Skills-Upper: Left Intact; Right Impaired    Tone & Sensation:    Tone: Normal  Sensation: Intact (occasional numbness in hands)                      Balance:  Sitting: Impaired  Sitting - Static: Good (unsupported)  Sitting - Dynamic: Fair (occasional) (unable to reach his feet)  Standing: Impaired  Standing - Static: Constant support;Good  Standing - Dynamic : Fair    Functional Mobility and Transfers for ADLs:  Bed Mobility:  Supine to Sit: Supervision;Contact guard assistance  Scooting: Independent    Transfers:  Sit to Stand: Contact guard assistance  Stand to Sit: Contact guard assistance  Toilet Transfer : Contact guard assistance using RW and verbal cues for safe techniuqe. Used the grab bar (has at home)    ADL Assessment:  Feeding: Independent (has modified diet--GI Lite)    Oral Facial Hygiene/Grooming: Supervision;Stand-by assistance    Bathing: Moderate assistance    Upper Body Dressing: Minimum assistance    Lower Body Dressing:  Moderate assistance (unable to reach to feet at thistime/unable to perform eunice)    Toileting: Total assistance (needs A for pouch-nsg educating- and has house at this time)                ADL Intervention and task modifications:     Pt educated on role of OT and OT plan of care. He agrees that mobility makes him feel better and is hopeful for rehab admission to return to independence. Pt ambulated in room, out of room a functional distance and then to bathroom. O2 sats were stable at 95% on room air. Pt reports that he fatigues quickly. Pt performed standing grooming at the sink with good tolerance for 2 minutes time--cues for safe use of RW                                Cognitive Retraining  Safety/Judgement: Awareness of environment    Therapeutic Exercise:  Encouraged OOB   Functional Measure:  Barthel Index:    Bathin  Bladder: 0  Bowels: 0  Groomin  Dressin  Feedin  Mobility: 0  Stairs: 0  Toilet Use: 5  Transfer (Bed to Chair and Back): 10  Total: 30       Barthel and G-code impairment scale:  Percentage of impairment CH  0% CI  1-19% CJ  20-39% CK  40-59% CL  60-79% CM  80-99% CN  100%   Barthel Score 0-100 100 99-80 79-60 59-40 20-39 1-19   0   Barthel Score 0-20 20 17-19 13-16 9-12 5-8 1-4 0      The Barthel ADL Index: Guidelines  1. The index should be used as a record of what a patient does, not as a record of what a patient could do. 2. The main aim is to establish degree of independence from any help, physical or verbal, however minor and for whatever reason. 3. The need for supervision renders the patient not independent. 4. A patient's performance should be established using the best available evidence. Asking the patient, friends/relatives and nurses are the usual sources, but direct observation and common sense are also important. However direct testing is not needed. 5. Usually the patient's performance over the preceding 24-48 hours is important, but occasionally longer periods will be relevant.   6. Middle categories imply that the patient supplies over 50 per cent of the effort. 7. Use of aids to be independent is allowed. Karen Ventura., Barthel, D.W. (4514). Functional evaluation: the Barthel Index. [address] [facility] (14)2. LEODAN Portillo, Raiza Appiah., Nitza Rosa., Zara Tiara, [address] (1999). Measuring the change indisability after inpatient rehabilitation; comparison of the responsiveness of the Barthel Index and Functional [place] Measure. Journal of Neurology, Neurosurgery, and Psychiatry, 66(4), 394-315. SERGE Dean, ALEA Avendano, & Juan Bence, MNORRIS. (2004.) Assessment of post-stroke quality of life in cost-effectiveness studies: The usefulness of the Barthel Index and the EuroQoL-5D. Quality of Life Research, 13, 404-31       G codes: In compliance with CMSs Claims Based Outcome Reporting, the following G-code set was chosen for this patient based on their primary functional limitation being treated: The outcome measure chosen to determine the severity of the functional limitation was the Barthel index with a score of 30/100 which was correlated with the impairment scale. ? Self Care:     - CURRENT STATUS: CL - 60%-79% impaired, limited or restricted    - GOAL STATUS: CK - 40%-59% impaired, limited or restricted    - D/C STATUS:  ---------------To be determined---------------     Occupational Therapy Evaluation Charge Determination   History Examination Decision-Making   MEDIUM Complexity : Expanded review of history including physical, cognitive and psychosocial  history  MEDIUM Complexity : 3-5 performance deficits relating to physical, cognitive , or psychosocial skils that result in activity limitations and / or participation restrictions MEDIUM Complexity : Patient may present with comorbidities that affect occupational performnce.  Miniml to moderate modification of tasks or assistance (eg, physical or verbal ) with assesment(s) is necessary to enable patient to complete evaluation Based on the above components, the patient evaluation is determined to be of the following complexity level: MEDIUM  Pain:  Pain Scale 1: Numeric (0 - 10)  Pain Intensity 1: 0  Pain Location 1: Groin  Pain Orientation 1: Left  Pain Description 1: Aching  Pain Intervention(s) 1: Medication (see MAR)  Activity Tolerance:   VSS during ambulation and self care pursuits. 95% O2 post activitiy    After treatment:   [x] Patient left in no apparent distress sitting up in chair  [] Patient left in no apparent distress in bed  [x] Call bell left within reach  [x] Nursing notified  [] Caregiver present  [] Bed alarm activated-nsg reports no need    COMMUNICATION/EDUCATION:   The patients plan of care was discussed with: Physical Therapist and Registered Nurse.  [] Home safety education was provided and the patient/caregiver indicated understanding. [x] Patient/family have participated as able in goal setting and plan of care. [x] Patient/family agree to work toward stated goals and plan of care. [] Patient understands intent and goals of therapy, but is neutral about his/her participation. [] Patient is unable to participate in goal setting and plan of care. This patients plan of care is appropriate for delegation to [facility].     Thank you for this referral.  Mary Toledo OTR/L  Time Calculation: 40 mins

## 2018-05-02 NOTE — PROGRESS NOTES
Palliative Medicine Consult  Chen: [phone] ([phone])    Patient Name: Precious Ashley  YOB: 1926    Date of Initial Consult: 5/1/18  Reason for Consult: care decisions  Requesting Provider: Dr. Catia Longoria  Primary Care Physician: PROVIDER UNKNOWN     SUMMARY:   Precious Ashley is a 80 y.o. with a past history of arthritis, sleep apnea, GERD, inguinal hernia repair 40 years ago, who was admitted on 4/28/2018 from home with a diagnosis of nausea/vomiting/abdominal pain/hernia. Current medical issues leading to Palliative Medicine involvement include: Patient had surgery for incarcerated L inguinal hernia with SBO, s/p sigmoid resection, end colostomy -- preliminary path + pancreatic cancer. Pancreatic mass and bilateral pulmonary nodules noted on CT Scan. Prior to admission patient was independent in all ADLs and still driving. Patient's only son Kirsty Núñez [facility]) lives locally. Patient also has a granddaughter who is about to finish RN training with [employer]. PALLIATIVE DIAGNOSES:   1. New diagnosis of metastatic pancreatic cancer. 2. POD #3 s/p repair incarcerated inguinal hernia/sigmoid resection/end colostomy  3. Post op abdominal pain, well controlled  4. Hearing impaired  5. Weakness   6. Debility  7. Counseling regarding goals of care and advance care planning      PLAN:   Brief Summary of Plan  -visited w/ pt in his rm. Son Mia Grullon) and GD Mariella Vizcarra) at bedside.   -family meeting scheduled for 1100 was held. Refugio Barney (ESAU) and I were present from our team  -goals of care and advance care planning discussed. See below. -we will continue to establish therapeutic alliance as well as provide psychosocial support    1. Goals of Care/Goals of Medical Treatment- confirmed  Pt wishes for full, restorative treatment and all measures that support this. This is w/ clear understanding that his condition is and will limit what can be done medically. 2. Advance Care Planning- pt's code status is FULL CODE. Active EMR order reflects this. Pt has AMD on file. Reviewed. ACP tab info reflects MPOAs. 3. Information Sharing-    I apologize for the potential confusion w/ my setting up family meeting for today at 80. This is in light of Dr. Sarita Aldridge having visited w/ them yesterday. I relayed that the meeting may be good to discuss any questions and concerns. Pt and family were amenable to having this family meeting. Initial conversation was mostly the pt's speaking. Son asked what our input was regarding the plan to go to SNF. It was shared that we would support them w/ whatever decision is made. We spoke about maintaining hope w/ them of his overall condition getting better and harmonizing that with what we know medically. This led to speaking about range of medical treatment. Separate from this, we discussed code status. Pt is clear on his wishes for the present time but did express that he would think about what we discuss some more; he would d/w his family. Please refer to Mary's note from today. Alf Sin is graduating from nursing school on 05/17/2018. Pt wishes to go to [facility] for rehab. He has been there before and felt that the therapy provided there was to his   \"satisfaction\". Pt expressed that he would like to do what he was doing before. But he also said that he might not be able to. Later in the conversation, he expressed that he would not be happy if he were \"bedridden\". He would like to get out and about. He would like to get back to driving. Pt has been driving his son to work ([employer]). Son works [id number]. He has worked for [employer] for ~30 yrs. He shared that he got a \"DUI\" and lost his license. He is on leave right now to be w/ his father. Questions and concerns addressed. Supportive listening provided.     4. Psychosocial Support- We will continue to support patient and family during this difficult hospitalization    5. Spiritual- at this time, there are no apparent spiritual concerns. 6. Symptom Management- at this time, there does not appear to be symptom management for which our assistance is needed. I have discussed with patients bedside RN, Ana Ramos  Time and input appreciated. I have discussed with our palliative care IDT. Thank you for this consult that has provided us with the opportunity to be involved in this patient's care. We will continue to follow along with you. Should you have any questions or concerns prior to our next visit at the bedside, please do not hesitate to contact us at [phone] Doris Casarez MD  Palliative Care Team     GOALS OF CARE / TREATMENT PREFERENCES:     GOALS OF CARE:  Patient/Health Care Proxy Stated Goals: Rehabilitation (goal right now is to rehab from recent surgery, then reassess. )      TREATMENT PREFERENCES:   Code Status: Full Code   (not discussed yet)   Advance Care Planning:  Advance Care Planning 5/2/2018   Patient's Healthcare Decision Maker is: Named in scanned ACP document   Primary Decision Maker Name Isaak Horn   Primary Decision Maker Phone Number [phone]   Primary Decision Maker Relationship to Patient Adult child   Secondary Decision Maker Name [address] [place] [address] Avenue Directive Yes, on file   Patient Would Like to Complete Advance Directive -       Medical Interventions: Limited additional interventions   Other Instructions: Other:    As far as possible, the palliative care team has discussed with patient / health care proxy about goals of care / treatment preferences for patient. HISTORY:     History obtained from: patient    CHIEF COMPLAINT: admitted with nausea, vomiting    HPI/SUBJECTIVE:    The patient is:   [x] Verbal and participatory  [] Non-participatory due to:   No o/n events or issues.   Per bedside RN, no staff concerns at this time. 80year old male, previously in very good health, was admitted with nausea, vomiting and abdominal pain. He was found to have incarcerated inguinal hernia. This had been repaired once many years ago, but was still ongoing problem-- patient always able to reduce hernia and hadn't really caused trouble before this admission        Clinical Pain Assessment (nonverbal scale for severity on nonverbal patients):   Clinical Pain Assessment  Severity: 0          Duration: for how long has pt been experiencing pain (e.g., 2 days, 1 month, years)  Frequency: how often pain is an issue (e.g., several times per day, once every few days, constant)     FUNCTIONAL ASSESSMENT:     Palliative Performance Scale (PPS):  PPS: 50       PSYCHOSOCIAL/SPIRITUAL SCREENING:     Palliative IDT has assessed this patient for cultural preferences / practices and a referral made as appropriate to needs (Cultural Services, Patient Advocacy, Ethics, etc.)    Advance Care Planning:  Advance Care Planning 5/2/2018   Patient's Healthcare Decision Maker is: Named in scanned ACP document   Primary Decision Maker Name Trinity Desai   Primary Decision Maker Phone Number [phone]   Primary Decision Maker Relationship to Patient Adult child   Secondary Decision Maker Name [address] [place] [address] Directive Yes, on file   Patient Would Like to Complete Advance Directive -       Any spiritual / Episcopalian concerns:  [] Yes /  [x] No    Caregiver Burnout:  [] Yes /  [x] No /  [] No Caregiver Present      Anticipatory grief assessment:   [x] Normal  / [] Maladaptive       ESAS Anxiety: Anxiety: 0    ESAS Depression: Depression: 0        REVIEW OF SYSTEMS:     Positive and pertinent negative findings in ROS are noted above in HPI. The following systems were [x] reviewed / [] unable to be reviewed as noted in HPI  Other findings are noted below.   Systems: constitutional, ears/nose/mouth/throat, respiratory, gastrointestinal, genitourinary, musculoskeletal, integumentary, neurologic, psychiatric, endocrine. Positive findings noted below. Modified ESAS Completed by: provider   Fatigue: 4 Drowsiness: 0   Depression: 0 Pain: 0   Anxiety: 0 Nausea: 0   Anorexia: 4 Dyspnea: 0     Constipation: No              PHYSICAL EXAM:     From RN flowsheet:  Wt Readings from Last 3 Encounters:   04/28/18 193 lb 12.8 oz (87.9 kg)   02/27/14 202 lb (91.6 kg)     Blood pressure 141/58, pulse 73, temperature 98.2 °F (36.8 °C), resp. rate 16, height 6' (1.829 m), weight 193 lb 12.8 oz (87.9 kg), SpO2 96 %. Pain Scale 1: Numeric (0 - 10)  Pain Intensity 1: 0  Pain Onset 1: post op  Pain Location 1: Groin  Pain Orientation 1: Left  Pain Description 1: Aching  Pain Intervention(s) 1: Medication (see MAR)  Last bowel movement, if known:     Gen: pleasant, comfortable sitting up in bed, in NAD  HEENT: NC/AT, MMM  Resp: breathing unlabored, symmetric  Neuro: awake, alert, no obvious confusion  Has difficulty hearing  Psych: mildly blunted affect, no obvious delusions or hallucinations  No obvious si/sx's of anxiety or agitation  Per Appelbaum criteria, pt demonstrates capacity regarding range of medical treatment, code status, dispo planning. More specifically,   -pt demonstrates understanding of the decision/choice communicated and is able to indicate a decision/choice  -pt demonstrates understanding of relevant information. Pt is able to paraphrase shared information.    -pt appreciates situation and its consequences.   -pt demonstrates the ability to reason about options including comparing options and consequences and provide reasons for selection of option       HISTORY:     Principal Problem:    Hernia, inguinal, recurrent, with obstruction (4/28/2018)    Active Problems:    Pancreatic mass (4/28/2018)      ECG abnormality (4/30/2018)      Past Medical History:   Diagnosis Date    Arthritis     ECG abnormality 4/30/2018    GERD (gastroesophageal reflux disease)     High cholesterol     Hypertension     Other ill-defined conditions(799.89) ~2000    arm tingling to ER, per pt states was not a stroke, but has been on plavix and aggrenox since. currently on plavix    Sleep apnea with use of continuous positive airway pressure (CPAP)       Past Surgical History:   Procedure Laterality Date    HX CATARACT REMOVAL Bilateral 2008 and 2012    HX HERNIA REPAIR Left     HX ORTHOPAEDIC Right     elbow     TOTAL KNEE ARTHROPLASTY Left 1999    TOTAL KNEE ARTHROPLASTY Right 2011      History reviewed. No pertinent family history. History reviewed, no pertinent family history.   Social History   Substance Use Topics    Smoking status: Never Smoker    Smokeless tobacco: Not on file    Alcohol use No     Allergies   Allergen Reactions    Aleve [Naproxen Sodium] Swelling     Throat swelling      Current Facility-Administered Medications   Medication Dose Route Frequency    tamsulosin (FLOMAX) capsule 0.4 mg  0.4 mg Oral DAILY    phenazopyridine (PYRIDIUM) tablet 200 mg  200 mg Oral TID PRN    pravastatin (PRAVACHOL) tablet 40 mg  40 mg Oral QHS    labetalol (NORMODYNE) tablet 200 mg  200 mg Oral BID    amLODIPine (NORVASC) tablet 10 mg  10 mg Oral DAILY    hydroCHLOROthiazide (HYDRODIURIL) tablet 25 mg  25 mg Oral DAILY    sodium chloride (NS) flush 5-10 mL  5-10 mL IntraVENous Q8H    sodium chloride (NS) flush 5-10 mL  5-10 mL IntraVENous PRN    0.9% sodium chloride infusion  50 mL/hr IntraVENous CONTINUOUS    ondansetron (ZOFRAN) injection 4 mg  4 mg IntraVENous Q4H PRN    piperacillin-tazobactam (ZOSYN) 3.375 g in 0.9% sodium chloride (MBP/ADV) 100 mL  3.375 g IntraVENous Q8H    hydrALAZINE (APRESOLINE) 20 mg/mL injection 10 mg  10 mg IntraVENous Q6H PRN    sodium chloride (NS) flush 5-10 mL  5-10 mL IntraVENous PRN    morphine injection 2 mg  2 mg IntraVENous Q2H PRN    enoxaparin (LOVENOX) injection 40 mg  40 mg SubCUTAneous DAILY          LAB AND IMAGING FINDINGS:     Lab Results   Component Value Date/Time    WBC 9.7 05/01/2018 03:27 AM    HGB 12.3 05/01/2018 03:27 AM    PLATELET 096 80/08/9146 03:27 AM     Lab Results   Component Value Date/Time    Sodium 136 05/02/2018 03:13 AM    Potassium 3.3 (L) 05/02/2018 03:13 AM    Chloride 101 05/02/2018 03:13 AM    CO2 25 05/02/2018 03:13 AM    BUN 24 (H) 05/02/2018 03:13 AM    Creatinine 1.23 05/02/2018 03:13 AM    Calcium 8.3 (L) 05/02/2018 03:13 AM    Magnesium 1.7 05/02/2018 03:13 AM      Lab Results   Component Value Date/Time    AST (SGOT) 15 05/01/2018 03:27 AM    Alk. phosphatase 57 05/01/2018 03:27 AM    Protein, total 5.7 (L) 05/01/2018 03:27 AM    Albumin 2.4 (L) 05/01/2018 03:27 AM    Globulin 3.3 05/01/2018 03:27 AM     Lab Results   Component Value Date/Time    INR 1.1 04/29/2018 10:28 AM    Prothrombin time 11.0 04/29/2018 10:28 AM    aPTT 25.4 11/19/2009 02:10 PM      No results found for: IRON, FE, TIBC, IBCT, PSAT, FERR   No results found for: PH, PCO2, PO2  No components found for: [place]   Lab Results   Component Value Date/Time    CK 32 (L) 04/28/2018 08:34 AM    CK - MB 2.0 04/28/2018 08:34 AM                Total time: 85 min   Counseling / coordination time, spent as noted above: 70 min  > 50% counseling / coordination?:     Prolonged service was provided for  []30 min   []75 min in face to face time in the presence of the patient, spent as noted above. Time Start:   Time End:   Note: this can only be billed with 14262 (initial) or 01028 (follow up). If multiple start / stop times, list each separately.

## 2018-05-02 NOTE — PROGRESS NOTES
Palliative Medicine  [place]: [phone]  [facility]: [phone]      Resuscitation Status: Full Code   Durable DNR addressed? [] Yes   [] No    [] Not Applicable    Advance Care Planning 5/2/2018   Patient's Healthcare Decision Maker is: Named in scanned ACP document   Primary Decision Maker Name Geronimo Garcia   Primary Decision Maker Phone Number [phone]   Primary Decision Maker Relationship to Patient Adult child   Secondary Decision Maker Name [facility] Directive Yes, on file   Patient Would Like to Complete Advance Directive -     Son, Margie Crisostomoy contacted Dr Ethan Sorto via phone to share verbally that patient had thought about conversation earlier today regarding Code Status and wanted to change Code status to DNAR. Dr. Ethna Sorto discussed with son that we would need to speak to and hear from patient directly regarding his wishes and agreed to meet tomorrow at 8 am with son present to discuss the above stated information.

## 2018-05-02 NOTE — PROGRESS NOTES
Admit Date: 2018    POD 4 Days Post-Op    Procedure:  Procedure(s) with comments:  REDUCED AND REPAIRED LEFT INGUINAL HERNIA WITH SIGMOID RESECTION END COLOSTOMY - REPAIR LEFT INGUINAL HERNIA, WITH POSSIBLE EXPLORATORY LAPAROTOMY    Subjective:     Patient has no new complaints. Tolerating po.  + ostomy function. Unable to void since house removed yesterday. Objective:     Blood pressure 128/63, pulse 71, temperature 98.5 °F (36.9 °C), resp. rate 17, height 6' (1.829 m), weight 193 lb 12.8 oz (87.9 kg), SpO2 95 %. Temp (24hrs), Av.5 °F (36.9 °C), Min:98 °F (36.7 °C), Max:99 °F (37.2 °C)      Physical Exam:  GENERAL: alert, cooperative, no distress, appears stated age, LUNG: clear to auscultation bilaterally, HEART: regular rate and rhythm, ABDOMEN: soft, non-tender.  Bowel sounds normal. No masses,  no organomegaly, wound c/d/i, ostomy healthy pink appearing, EXTREMITIES:  extremities normal, atraumatic, no cyanosis or edema    Labs:   Recent Results (from the past 24 hour(s))   METABOLIC PANEL, BASIC    Collection Time: 18  3:13 AM   Result Value Ref Range    Sodium 136 136 - 145 mmol/L    Potassium 3.3 (L) 3.5 - 5.1 mmol/L    Chloride 101 97 - 108 mmol/L    CO2 25 21 - 32 mmol/L    Anion gap 10 5 - 15 mmol/L    Glucose 136 (H) 65 - 100 mg/dL    BUN 24 (H) 6 - 20 MG/DL    Creatinine 1.23 0.70 - 1.30 MG/DL    BUN/Creatinine ratio 20 12 - 20      GFR est AA >60 >60 ml/min/1.73m2    GFR est non-AA 55 (L) >60 ml/min/1.73m2    Calcium 8.3 (L) 8.5 - 10.1 MG/DL   MAGNESIUM    Collection Time: 18  3:13 AM   Result Value Ref Range    Magnesium 1.7 1.6 - 2.4 mg/dL       Data Review images and reports reviewed    Assessment:     Principal Problem:    Hernia, inguinal, recurrent, with obstruction (2018)    Active Problems:    Pancreatic mass (2018)      ECG abnormality (2018)        Plan/Recommendations/Medical Decision Making:     Continue present treatment   Ostomy teaching ongoing  Path shows colonic mass is adenocarcinoma, not arising in colon, c/w pancreatic primary. Chest CT shows diffuse metastatic pattern. Oncology and Palliative consults placed. No role for aggressive treatment in this unfortunate setting. Replace house. Family meeting with palliative today. Jayy Briggs.  Juliana Blackwell MD, [facility]

## 2018-05-02 NOTE — PROGRESS NOTES
Bedside shift change report given to Carola David (oncoming nurse) by Katya Palacio (offgoing nurse). Report included the following information SBAR, Kardex, ED Summary, Procedure Summary, Intake/Output, MAR and Recent Results.

## 2018-05-02 NOTE — PROGRESS NOTES
[facility] CM has called to review chart. References OT note from 5/1/2018 that patient was unwilling to participate. Chart Review shows that patient had received news of new dx of metastatic pancreatic cancer. Catia CM also inquired about pt preference to SNF vs home. Chart review shows that patient has Palliative Care meeting scheduled with family today at [address] [facility] [address]  OT will most likely attempt to see patient again today. CM will continue to follow. [address] [place] [address] [phone]  Fax - [phone]    Requested updated PTOT notes from today and Palliative Care notes when available. 1200 - PC team has informed this CM that they have met with patient. Patient would like to go to rehab to improve his strength and e able to return home. PC note to follow. Failed attempt to meet with patient and family. OT is presently working with patient. 1520 - Chart Review. OT PT is recommending IP Rehab. Bette Marley OT informed this CM patient would be appropriate. Reviewed recommendation with patient and he is agreeable. CM will need to withdraw SNF request and submit referral for IP Rehab. Patient would like referral submitted to [facility]. Patient also informed this CM he would like to place a request to Transfer to [employer]. \"I am hoping they will help pay for this  Admission, if needed. \"  [facility] requests signed. Patient deferred to sign actual Transfer Consent form. \"I will do that later\". This Cm informed Maria Elena Casiano care to withdraw request for SNF. Catia from [facility] agent for SNF) also said she would inform them. Sarah informed via [employer]. Referral for [facility] submitted via [employer]. CM will continue to follow.      Mk Omalley  Ext [phone]

## 2018-05-02 NOTE — WOUND CARE
Ostomy Care: Colostomy POD# 4   Patient appears very tired/sleepy today. Patient just had house cathter re-placed after inability to void. Palliative care to meet with family today around 11am to discuss goals of care. Patient and family working through the grim diagnosis they received yesterday. Pt's son, Lelo Cueto, and Granddaughter, Dolly Hunt, present in room. Dolly Hunt is graduating from [address] [place] [address] in a couple of weeks. Ostomy nurse talked about ostomy pouch and accessories before leading the 2 of them through a cut to fit pouch application on a stoma model. Plan: Patient's goals of care yet to be determined, but suspect patient will still be here Friday, so plans made with pt's son Lelo Cueto to meet at 10am Friday to change his fathers pouch together. Ostomy nurse to check in with patient tomorrow for any needs and work with family's plan of care.     Patti Prieto RN, CWON, zone ph# [phone]

## 2018-05-02 NOTE — PROGRESS NOTES
Palliative Medicine Psychosocial Assessment  Gustavo: [phone] ([phone])  Dre elizalde: [phone] ([phone])        Reason for Assessment:    []Depression  []Anxiety  []Caregiver [place]  []Maladaptive Coping with Serious Illness     [x]Other: New diagnosis of Pancreatic Mass and Care decisions. Sources of Information:    [x]Patient  [x]Family  [x]Staff  [x]Medical Record    Medical/Physical Functioning:  Principal Problem:    Hernia, inguinal, recurrent, with obstruction (4/28/2018)    Active Problems:    Pancreatic mass (4/28/2018)      ECG abnormality (4/30/2018)        Advance Care Planning:  Advance Care Planning 5/2/2018   Patient's Healthcare Decision Maker is: Named in scanned ACP document   Primary Decision Maker Name Paulino Garcia   Primary Decision Maker Phone Number [phone]   Primary Decision Maker Relationship to Patient Adult child   Secondary Decision Maker Name [address] [place] [address] Directive Yes, on file   Patient Would Like to Complete Advance Directive -       Mental Status:    [x]Alert  []Lethargic  []Unresponsive  Oriented to:  []Person  []Place  []Time  []Situation      Barriers to Learning:    []Language  []Developmental  []Cognitive  []Altered Mental Status  []Visual/Hearing Impairment  []Unable to Read/Write  []Motivational   [x]No Barriers Identified  []Other:    Relationship Status:  []Single  []  []Significant Other/Life Partner  []  []  [x]  Since about 4 years ago    Living Circumstances:  []Lives Alone  [x]Family/Significant Other in Household  []Roommates  []Children in the Home  []Paid Caregivers  []Assisted Living Facility/Group Home  []Skilled [address]  []Homeless  []Incarcerated  []Environmental/Care Concerns  []Transportation Issues  [] Issues  []Domestic Violence []Other:     Son lives with patient but works 3 - 11 pm at [employer].     Support System:    [x]Strong  []Fair  []Limited   Son and grand daughter are supports. Financial/Legal Concerns:    []Uninsured  []Limited Income/Resources  []Non-Citizen  []Utility Issues  []Food Insecurity []No Concerns Identified  []Financial POA:    [x]Other: Patient is a [employer] [place] and has received services from the [place]    Christian/Spiritual/Existential:  [x]Strong Sense of Spirituality  []Involved in [employer]  []Request  Visit  []Expressing Teresal Quispe  []No Concerns Identified    Coping with Illness:         Patient: Family/Caregiver:   Understanding and Acceptance of Illness/Prognosis  [x] [x]   Strong Sense of Resilience [x] []   Self Reflection [x] []   Engaged Support System [x] []   Does not Readily Discuss Illness [] []   Denial of Terminal Status [] []   Anger [] []   Depression [] []   Anxiety/Fear [] []   Bargaining [] []   Recent Diagnosis/Prognosis [] []   Difficulties with Body Image [] []   Loss of Identity [] []   Excessive Substance Use [] []   Mental Health History [] []   Enmeshed Relationships [] []   History of Loss [] []   Anticipatory Grief [x] []   Concern for Complicated Grief [] []   Suicidal Ideation or Plan [] []   Unable to assess [] []                    Narrative: Emerald Yang is a 80 y.o. with a past history of arthritis, sleep apnea, GERD, inguinal hernia repair 40 years ago, who was admitted on 4/28/2018 from home with a diagnosis of nausea/vomiting/abdominal pain/hernia. Mr. Janice Parsons lives in his home, with some help from his son, Gurjit Garcia, who Mr Janice Parsons actually helps with transportation. He drives his son Janice Parsons to work. Today the Palliative team of Dr. Inge Correa and Luisa Low [facility] met with patient, son Gurjit Garcia and grand daughter Lori Brantley) to offer support, clarify any questions that they have and also to explore goals of care. Patient is alert, decisional, able to engage in conversation.  He is hard of hearing and did not have his hearing aides, but did well with a louder tone of voice. Patient is beginning to process his new diagnosis. He voiced that he has \"taken care of my health, I never smoked, I did everything right\". At the same time he noted, \"everyone has to go at some point\". Mr Brittaney Alfaro hopes that he can regain some level of previous functioning but he is unsure how much he will be able to do. He is hoping that he can go to rehab and regain some of his strength. Son, Yefri Rose wanted to know what the next plans/steps would be for patient, in terms of discharge and what type of help would be needed in the home following discharge. LCSW noted that CM would meet with family to discuss this and that it seemed that CM was looking into rehab as a possible option. Grand daughter Brissa Lucas was mostly quiet, but actively listening to the conversation. Palliative team discussed and explored goals of care including spectrum of medical treatment, from Full Restorative Care to Comfort care and asked patient to think about what he would want in certain circumstances. (Examples used included tube feeds if unable to eat naturally by mouth or a breathing machine/ventilator if unable to breathe independently). Patient discussed how his wife was unable to swallow, prior to her death, about four years ago and how they declined a feeding tube. He noted he would not want that for himself. Regarding a ventilator, patient did state, \"I would not want to be on machines\". Education given regarding Code Status, Full Code vs No Code. Patient is unable to make a decision regarding code status today. Encouraged him to think about this and to contact Palliative Medicine or his PCP if he had any questions. Psychosocial Issues: 1. New diagnosis of Pancreatic Mass, processing this. 2. Good family support of son and grand daughter, but it appears that son is sometimes reliant on patient (for transportation, lives with patient). [address] daughter to graduate from Nursing school this year. Patient's wife  about 4 years ago. 3. Alert, cognizant, [place], able to make decisions. 4. Undecided regarding Code status. Is clear that he would not want artificial nutrition. AMD is complete (). Patient needs time to process all the conversation we had today. 5. He and family have not had some of these difficult conversations, it appears. Son got up at one point and turned his back to patient and practitioners, appears it was emotional for him. [address] daughter a bit tearful at times (natural grief). 6. Spoke to [employer] regarding family desire to talk to her regarding discharge, following hospital stay. Goals/Plan: Full Restorative Treatment, Full Code. (Code discussed today and patient needs time to process).

## 2018-05-02 NOTE — ACP (ADVANCE CARE PLANNING)
Reviewed Mr. Cruz's AMD, which is scanned in chart. This AMD is from 2/2014. Updated the AMD portion of the chart. SonEvelyn is named as primary mPOA [phone] and grand daughter, Brooke Francisco is named as secondary mPOA. Dr. Kristin Jason and Brandan Gage [facility] met with Mr. Fransisco Avila, his son and grand daughter today. Mr Lashon Desai is alert, aware and decisional.  Code Status was discussed today. Mr. Fransisco Avila remains a Full code status. He needed more time to think about conversations that took place about this subject. 5/3/18 's code status is now DNAR. He signed a DDNR \"I had time to think about what we discussed yesterday, I have lived a long life, I will let the good Lord decide when it is my time\".

## 2018-05-03 PROBLEM — I10 HTN (HYPERTENSION): Status: ACTIVE | Noted: 2018-01-01

## 2018-05-03 PROBLEM — C25.9 PRIMARY PANCREATIC CANCER WITH METASTASIS TO OTHER SITE (HCC): Status: ACTIVE | Noted: 2018-01-01

## 2018-05-03 NOTE — PROGRESS NOTES
0700-Bedside shift change report given to Edgard Kang (oncoming nurse) by Chaka Pollack (offgoing nurse). Report included the following information SBAR, Kardex and MAR. 1900-Bedside shift change report given to Landy Hernandez (oncoming nurse) by Edgard Kang (offgoing nurse). Report included the following information SBAR, Kardex and MAR.

## 2018-05-03 NOTE — PROGRESS NOTES
Hospitalist Progress Note    NAME: Luis Manuel Love   :  1926   MRN:  [id number]       Interim Hospital Summary: 80 y.o. male whom presented on 2018 with      Assessment / Plan:  Incarcerated Left Inguinal Hernia with SBO  - s/p reduced and repaired left inguinal hernia with sigmoid resection end colostomy, repair left inguinal hernia with exp lap  - abd distended with (+) bowel sounds, no flatus yet. - tolerating GI lite     Urinary retention  BPH  - house was reinserted  due to bladder re tension.  follow up as outpatient.      HTN  - continue with Norvasc, HCTZ, & Labetalol  - will consider holding HCTZ if creat continue to trending up  -  use hydralazine prn.      Complex Pancreatic Mass  Metastatic adenocarcinoma  - Chest CT: Bilateral pulmonary nodules suspicious for metastases  - Ca19-9 very high 1222; heme/onc following. Poor prognosis. Appreciate palliative team input  - appreciate GI input: lesion not resectable. No further procedure planned at this time      Leukocytosis  - WBC trending down to 9.7 from 14.6  - continue with Zosyn per primary team, general surgery  - U/A and CXR are negative.      Hyperlipidemia  -  resume statin      Hypokalemia  - replace and monitor      Body mass index is 26.28 kg/(m^2). Code status: DNR/DNI  Prophylaxis: Lovenox  Recommended Disposition: SNF  Pt is medically stable for discharge       Subjective:     Chief Complaint / Reason for Physician Visit  \"I feel ok\". Discussed with RN events overnight. Review of Systems:  Symptom Y/N Comments  Symptom Y/N Comments   Fever/Chills n   Chest Pain n    Poor Appetite    Edema     Cough    Abdominal Pain     Sputum    Joint Pain     SOB/BUI n   Pruritis/Rash     Nausea/vomit n   Tolerating PT/OT     Diarrhea    Tolerating Diet     Constipation    Other       Could NOT obtain due to:      Objective:     VITALS:   Last 24hrs VS reviewed since prior progress note.  Most recent are:  Patient Vitals for the past 24 hrs:   Temp Pulse Resp BP SpO2   05/03/18 1143 98.7 °F (37.1 °C) 67 20 110/55 97 %   05/03/18 0752 98.9 °F (37.2 °C) 73 20 124/52 97 %   05/03/18 0408 98.7 °F (37.1 °C) 84 16 143/68 97 %   05/02/18 2304 98.9 °F (37.2 °C) 73 16 132/58 94 %   05/02/18 2121 99.1 °F (37.3 °C) - - - -   05/02/18 2037 100.3 °F (37.9 °C) - - - -   05/02/18 1959 100.3 °F (37.9 °C) 75 18 132/57 94 %   05/02/18 1819 97.6 °F (36.4 °C) 78 18 149/72 96 %       Intake/Output Summary (Last 24 hours) at 05/03/18 1412  Last data filed at 05/03/18 1145   Gross per 24 hour   Intake          2108.33 ml   Output             2325 ml   Net          -216.67 ml        PHYSICAL EXAM:  General: Ill appearing. Alert, cooperative, no acute distress    EENT:  EOMI. Anicteric sclerae. MMM  Resp:  CTA bilaterally, no wheezing or rales. No accessory muscle use  CV:  Regular  rhythm,  No edema  GI:  Soft, Non distended, Non tender.  +Bowel sounds  Neurologic:  Alert and oriented X 3, normal speech,   Psych:            Fair insight. Not anxious nor agitated  Skin:  No rashes. No jaundice    Reviewed most current lab test results and cultures  YES  Reviewed most current radiology test results   YES  Review and summation of old records today    NO  Reviewed patient's current orders and MAR    YES  PMH/ reviewed - no change compared to H&P  ________________________________________________________________________  Care Plan discussed with:    Comments   Patient y    Family      RN y    Care Manager y    Consultant                       y Multidiciplinary team rounds were held today with , nursing, pharmacist and clinical coordinator. Patient's plan of care was discussed; medications were reviewed and discharge planning was addressed.      ________________________________________________________________________  Total NON critical care TIME:  20   Minutes    Total CRITICAL CARE TIME Spent:   Minutes non procedure based      Comments   >50% of visit spent in counseling and coordination of care     ________________________________________________________________________  Lian Mayorga NP     Procedures: see electronic medical records for all procedures/Xrays and details which were not copied into this note but were reviewed prior to creation of Plan. LABS:  I reviewed today's most current labs and imaging studies.   Pertinent labs include:  Recent Labs      05/03/18 0426 05/01/18   0327   WBC  7.8  9.7   HGB  11.3*  12.3   HCT  33.4*  36.7   PLT  234  184     Recent Labs      05/03/18 0426 05/02/18   0313  05/01/18   0327   NA  135*  136  136   K  3.5  3.3*  3.3*   CL  104  101  102   CO2  22  25  25   GLU  126*  136*  138*   BUN  24*  24*  24*   CREA  1.11  1.23  1.28   CA  8.1*  8.3*  8.2*   MG  1.8  1.7  1.9   ALB   --    --   2.4*   TBILI   --    --   1.9*   SGOT   --    --   15   ALT   --    --   14       Signed: )Cortez Singh NP

## 2018-05-03 NOTE — PROGRESS NOTES
Palliative Medicine  [facility]: [phone] ([phone])  [facility]: [phone] ([phone])      Resuscitation Status: DNR   Durable DNR addressed? [x] Yes   [] No    [] Not Applicable   DDNR signed today    Advance Care Planning 5/3/2018   Patient's Healthcare Decision Maker is: Named in scanned ACP document   Primary Decision Maker Name Melva Rocha   Primary Decision Maker Phone Number [phone]   Primary Decision Maker Relationship to Patient Adult child   Secondary Decision Maker Name Elsy Crow   Secondary Decision Maker Phone Number [phone]   Secondary Decision Maker Relationship to Patient Other relative   Confirm Advance Directive Yes, on file   Patient Would Like to Complete Advance Directive No   Does the patient have other document types Do Not Resuscitate     Mr. Judithann Aschoff is alert, aware, oriented X3, sitting up in chair in his room, shaving with an electric razor. He noted, \"my son is not here, he may not come in as he was sick yesterday\". Patient shared, \"I have had a chance to think about what we talkied about yesterday, I don't want to be on machines. I've lived a good long life, I will let the good Lord take me when it is my time\". Dr. Javon Kim and this LCSW discussed patient's wishes with him, clarified that patient wishes to be a DNAR. Discussed with patient how it was important that he is able to make this decision on his own. He has discussed his decision with both son and grand daughter aNte Groves. DDNR signed, copies made for chart and for patient. Patient to be discharged to rehab per chart.

## 2018-05-03 NOTE — PROGRESS NOTES
Problem: Mobility Impaired (Adult and Pediatric)  Goal: *Acute Goals and Plan of Care (Insert Text)  Physical Therapy Goals  Initiated 4/30/2018  1. Patient will move from supine to sit and sit to supine  and roll side to side in bed with independence within 7 day(s). 2.  Patient will transfer from bed to chair and chair to bed with independence using the least restrictive device within 7 day(s). 3.  Patient will perform sit to stand with independence within 7 day(s). 4.  Patient will ambulate with modified independence for 200 feet with the least restrictive device within 7 day(s). 5.  Patient will ascend/descend 15 stairs with 1 handrail(s) with modified independence within 7 day(s). physical Therapy TREATMENT  Patient: Jasmeet Snyder (33 y.o. male)  Date: 5/3/2018  Diagnosis: Hernia, inguinal, recurrent, with obstruction, left/ Pancreatic mass, abnormal CT     Procedure(s) (LRB):  REDUCED AND REPAIRED LEFT INGUINAL HERNIA WITH SIGMOID RESECTION END COLOSTOMY (Left) 5 Days Post-Op     Precautions: Fall, Skin  Chart, physical therapy assessment, plan of care and goals were reviewed. ASSESSMENT: pt tolerated tx well, super motivated, good in-pt candidate, no LOB, min SOB, still a little weak and not at baseline, did well with transfers and ther-ex, vc's for safety and proper RW use. Progression toward goals:  [x]    Improving appropriately and progressing toward goals  []    Improving slowly and progressing toward goals  []    Not making progress toward goals and plan of care will be adjusted     PLAN:  Patient continues to benefit from skilled intervention to address the above impairments. Continue treatment per established plan of care.   Discharge Recommendations:  Inpatient Rehab  Further Equipment Recommendations for Discharge:  rolling walker     OBJECTIVE DATA SUMMARY:     Critical Behavior:  Neurologic State: Alert  Orientation Level: Oriented X4  Cognition: Follows commands  Safety/Judgement: Awareness of environment     Functional Mobility Training:  Bed Mobility: pt sitting in chair on arrival    Transfers:  Sit to Stand: Contact guard assistance;Assist x1;Additional time  Stand to Sit: Contact guard assistance  Interventions: Tactile cues; Verbal cues  Level of Assistance: Contact guard assistance     Balance:  Sitting: Intact; Without support  Sitting - Static: Good (unsupported)  Sitting - Dynamic: Not tested  Standing: Intact; With support  Standing - Static: Good;Constant support  Standing - Dynamic : Good     Ambulation/Gait Training:  Distance (ft): 100 Feet (ft)  Assistive Device: Gait belt;Walker, rolling  Ambulation - Level of Assistance: Contact guard assistance;Stand-by assistance  Gait Abnormalities: Decreased step clearance  Right Side Weight Bearing: Full  Left Side Weight Bearing: Full  Base of Support: Narrowed  Speed/Mckenzie: Slow  Step Length: Left shortened;Right shortened    Therapeutic Exercises:   sitting  EXERCISE   Sets   Reps   Active Active Assist   Passive   Comments   Ankle pumps 1 10 [x] [] [] bilat   Heel raises 1 10 [x] [] [] \"   Toe tap 1 10 [x] [] [] \"   Knee ext 1 10 [x] [] [] \"   Hip flex 1 10 [x] [] [] \"     Pain:  Pain Scale 1: Numeric (0 - 10)  Pain Intensity 1: 0    Activity Tolerance: fair    After treatment:   [x]    Patient left in no apparent distress sitting up in chair  []    Patient left in no apparent distress in bed  [x]    Call bell left within reach  [x]    Nursing notified  [x]    Caregiver present  []    Bed alarm activated    COMMUNICATION/COLLABORATION:   The patients plan of care was discussed with: Registered Nurse    Tiara Irby PTA   Time Calculation: 25 mins

## 2018-05-03 NOTE — PROGRESS NOTES
PCU SHIFT NURSING NOTE      Bedside and Verbal shift change report given to Irlanda Price RN (oncoming nurse) by Anastasia Patel RN (offgoing nurse). Report included the following information SBAR, Kardex, MAR and Recent Results. Shift Summary:   0715: Bedside and Verbal shift change report given to Dagoberto Mcneill RN (oncoming nurse) by Irlanda Price RN (offgoing nurse). Report included the following information SBAR, Kardex, MAR and Recent Results. Admission Date 4/28/2018   Admission Diagnosis Hernia, inguinal, recurrent, with obstruction  Pancreatic mass  inguinal hernia left  abnormal ct   Consults IP CONSULT TO GENERAL SURGERY  IP CONSULT TO HOSPITALIST  IP CONSULT TO GASTROENTEROLOGY  IP CONSULT TO HEMATOLOGY  IP CONSULT TO PALLIATIVE CARE - PROVIDER        Consults   [x]PT   [x]OT   []Speech   [x]Case Management      [] Palliative      Cardiac Monitoring Order   [x]Yes   []No     IV drips   []Yes    Drip:                            Dose:  Drip:                            Dose:  Drip:                            Dose:   [x]No     GI Prophylaxis   []Yes   [x]No         DVT Prophylaxis   SCDs:  Sequential Compression Device: Bilateral          Rafa stockings:  Graduated Compression Stockings: Bilateral      [] Medication   []Contraindicated   []None      Activity Level Activity Level: Up with Assistance     Activity Assistance: Partial (one person)   Purposeful Rounding every 1-2 hour? [x]Yes   Richardson Score  Total Score: 1   Bed Alarm (If score 3 or >)   []Yes   [] Refused (See signed refusal form in chart)   Trevor Score  Trevor Score: 18   Trevor Score (if score 14 or less)   []PMT consult   []Wound Care consult      []Specialty bed   [] Nutrition consult          Needs prior to discharge:   Home O2 required:    []Yes   [x]No    If yes, how much O2 required? Other:    Last Bowel Movement: Last Bowel Movement Date: 05/03/18      Influenza Vaccine Received Flu Vaccine for Current Season (usually Sept-March):  Yes Pneumonia Vaccine           Diet Active Orders   Diet    DIET GI LITE (POST SURGICAL)      LDAs               Peripheral IV 04/28/18 Right Antecubital (Active)   Site Assessment Clean, dry, & intact 5/2/2018  7:59 PM   Phlebitis Assessment 0 5/2/2018  7:59 PM   Infiltration Assessment 0 5/2/2018  7:59 PM   Dressing Status Clean, dry, & intact 5/2/2018  7:59 PM   Dressing Type Tape;Transparent 5/2/2018  7:59 PM   Hub Color/Line Status Pink 5/2/2018  7:59 PM   Action Taken Open ports on tubing capped 5/2/2018  7:32 AM   Alcohol Cap Used Yes 5/1/2018 11:26 PM       Peripheral IV 05/02/18 Left Forearm (Active)          Colostomy 04/28/18 Lower ; Left Abdomen (Active)   Drainage Color Mone Poplin 5/2/2018  9:30 PM   Site Assessment Clean, dry, intact; Pink 5/2/2018  9:30 PM   Treatment Site care 5/2/2018  9:30 PM   Output (ml) 150 mL 5/2/2018  9:30 PM                Urinary Catheter Urinary Catheter 05/02/18 Coude-Indications for Use: Chronic urinary retention/bladder outlet obstruction  [REMOVED] Urinary Catheter 04/28/18 2- way-Indications for Use: Surgery    Intake & Output   Date 05/02/18 0700 - 05/03/18 0659 05/03/18 0700 - 05/04/18 0659   Shift 2923-4472 6701-1465 24 Hour Total 5430-9046 3142-0879 24 Hour Total   I  N  T  A  K  E   P.O. 960  960         P. O. 960  960       I.V.  (mL/kg/hr) 635  (0.6)  635         Volume (0.9% sodium chloride infusion) 535  535         Volume (piperacillin-tazobactam (ZOSYN) 3.375 g in 0.9% sodium chloride (MBP/ADV) 100 mL) 100  100       Shift Total  (mL/kg) 1595  (18.1)  1595  (18.1)      O  U  T  P  U  T   Urine  (mL/kg/hr) 1075  (1)  1075         Urine Output (mL) (Urinary Catheter 05/02/18 Coude) 1075  1075       Stool 0 550 550         Output (ml) (Colostomy 04/28/18 Lower ; Left Abdomen) 0 550 550       Shift Total  (mL/kg) 1075  (12.2) 550  (6.3) 1625  (18.5)       -550 -30      Weight (kg) 87.9 87.9 87.9 87.9 87.9 87.9         Readmission Risk Assessment Tool Score Low Risk 8       Total Score        3 Has Seen PCP in Last 6 Months (Yes=3, No=0)    5 Pt. Coverage (Medicare=5 , Medicaid, or Self-Pay=4)        Criteria that do not apply:    . Living with Significant Other. Assisted Living. LTAC. SNF.  or   Rehab    Patient Length of Stay (>5 days = 3)    IP Visits Last 12 Months (1-3=4, 4=9, >4=11)    Charlson Comorbidity Score (Age + Comorbid Conditions)       Expected Length of Stay 3d 19h   Actual Length of Stay 5

## 2018-05-03 NOTE — PROGRESS NOTES
CM called Deborah Martinez ([address]) regarding referral and left confidential VM requesting follow-up. Per [employer] referal, pt has been approved by [facility] and they submitted for insurance auth this morning at 9:55AM. Awaiting auth at this time.      Alyssa Horn, MSW Supervisee in Social Work, [employer]  [phone]

## 2018-05-03 NOTE — PROGRESS NOTES
Problem: Self Care Deficits Care Plan (Adult)  Goal: *Acute Goals and Plan of Care (Insert Text)  Occupational Therapy Goals  Initiated 5/2/2018  1. Patient will perform grooming in s tanding with supervision/set-up within 7 day(s). 2.  Patient will perform lower body dressing with minimal assistance/contact guard assist, using adaptive aids, prn within 7 day(s). 3.  Patient will perform simple home management with minimal assistance/contact guard assist, using adaptive aids, prn within 7 day(s). 4.  Patient will perform toilet transfers with supervision/set-up within 7 day(s). 5.  Patient will perform all aspects of toileting with minimal assistance/contact guard assist within 7 day(s). 6.  Patient will participate in upper extremity therapeutic exercise/activities with supervision/set-up for 10 minutes within 7 day(s). 7.  Patient will utilize energy conservation techniques during functional activities with minimal verbal cues within 7 day(s). Occupational Therapy TREATMENT  Patient: Chloe Vazquez (1 y.o. male)  Date: 5/3/2018  Diagnosis: Hernia, inguinal, recurrent, with obstruction  Pancreatic mass  inguinal hernia left  abnormal ct Hernia, inguinal, recurrent, with obstruction  Procedure(s) (LRB):  REDUCED AND REPAIRED LEFT INGUINAL HERNIA WITH SIGMOID RESECTION END COLOSTOMY (Left) 5 Days Post-Op  Precautions: Fall, Skin  Chart, occupational therapy assessment, plan of care, and goals were reviewed. ASSESSMENT:  Patient received in chair, remains limited in LB ADLs by decreased functional reach. Pt able to doff gripper socks via cross leg with min A, but needed total A to don compression hose and shoes with lace ties. Pt uses a long handled shoe horn at baseline. Verbally educated pt on sock aid and reacher to assist with LB ADLs as pt has limited functional reach to ankles impacted by ostomy bag and L hernia repair.  He completed sit to stand with CGA, standing ADLs at sink with RW and SBA x 2 minutes. Pt is very motivated to regain his PLOF and would be an ideal IP rehab candidate. Progression toward goals:  []       Improving appropriately and progressing toward goals  [x]       Improving slowly and progressing toward goals  []       Not making progress toward goals and plan of care will be adjusted     PLAN:  Patient continues to benefit from skilled intervention to address the above impairments. Continue treatment per established plan of care. Discharge Recommendations:  Inpatient Rehab  Further Equipment Recommendations for Discharge:  TBD     SUBJECTIVE:   Patient stated I have a long handled shoe horn at home.     OBJECTIVE DATA SUMMARY:   Cognitive/Behavioral Status:  Neurologic State: Alert  Orientation Level: Oriented X4  Cognition: Follows commands  Perception: Appears intact  Perseveration: No perseveration noted       Functional Mobility and Transfers for ADLs:  Bed Mobility:       Transfers:  Sit to Stand: Contact guard assistance          Balance:  Sitting: Intact; With support (chair)  Standing: Impaired; With support (RW)  Standing - Static: Good  Standing - Dynamic : Fair    ADL Intervention:       Grooming  Brushing Teeth: Stand-by assistance (at sink x 2 minutes, cues square RW to sink)          verbally educated pt on sock aid and reacher to assist with LB ADLs 2* pt limited functional reach. Limited by L inguinal hernia repair pain and ostomy bag                   Pain:  Pain Scale 1: Numeric (0 - 10)  Pain Intensity 1: 0              Activity Tolerance:   VSS  Please refer to the flowsheet for vital signs taken during this treatment.   After treatment:   [x] Patient left in no apparent distress sitting up in chair  [] Patient left in no apparent distress in bed  [x] Call bell left within reach  [x] Nursing notified  [x] Caregiver present  [] Bed alarm activated    COMMUNICATION/COLLABORATION:   The patients plan of care was discussed with: Physical Therapy Assistant, Registered Nurse and Rehabilitation Attendant    Rasheeda Smith, OT  Time Calculation: 20 mins

## 2018-05-03 NOTE — PROGRESS NOTES
Admit Date: 2018    POD 5 Days Post-Op    Procedure:  Procedure(s) with comments:  REDUCED AND REPAIRED LEFT INGUINAL HERNIA WITH SIGMOID RESECTION END COLOSTOMY - REPAIR LEFT INGUINAL HERNIA, WITH POSSIBLE EXPLORATORY LAPAROTOMY    Subjective:     Patient has no new complaints. Tolerating po.  + ostomy function. Objective:     Blood pressure 110/55, pulse 67, temperature 98.7 °F (37.1 °C), resp. rate 20, height 6' (1.829 m), weight 193 lb 12.8 oz (87.9 kg), SpO2 97 %. Temp (24hrs), Av.1 °F (37.3 °C), Min:97.6 °F (36.4 °C), Max:100.3 °F (37.9 °C)      Physical Exam:  GENERAL: alert, cooperative, no distress, appears stated age, LUNG: clear to auscultation bilaterally, HEART: regular rate and rhythm, ABDOMEN: soft, non-tender.  Bowel sounds normal. No masses,  no organomegaly, wound c/d/i, ostomy healthy pink appearing, EXTREMITIES:  extremities normal, atraumatic, no cyanosis or edema    Labs:   Recent Results (from the past 24 hour(s))   METABOLIC PANEL, BASIC    Collection Time: 18  4:26 AM   Result Value Ref Range    Sodium 135 (L) 136 - 145 mmol/L    Potassium 3.5 3.5 - 5.1 mmol/L    Chloride 104 97 - 108 mmol/L    CO2 22 21 - 32 mmol/L    Anion gap 9 5 - 15 mmol/L    Glucose 126 (H) 65 - 100 mg/dL    BUN 24 (H) 6 - 20 MG/DL    Creatinine 1.11 0.70 - 1.30 MG/DL    BUN/Creatinine ratio 22 (H) 12 - 20      GFR est AA >60 >60 ml/min/1.73m2    GFR est non-AA >60 >60 ml/min/1.73m2    Calcium 8.1 (L) 8.5 - 10.1 MG/DL   MAGNESIUM    Collection Time: 18  4:26 AM   Result Value Ref Range    Magnesium 1.8 1.6 - 2.4 mg/dL   CBC WITH AUTOMATED DIFF    Collection Time: 18  4:26 AM   Result Value Ref Range    WBC 7.8 4.1 - 11.1 K/uL    RBC 3.29 (L) 4.10 - 5.70 M/uL    HGB 11.3 (L) 12.1 - 17.0 g/dL    HCT 33.4 (L) 36.6 - 50.3 %    .5 (H) 80.0 - 99.0 FL    MCH 34.3 (H) 26.0 - 34.0 PG    MCHC 33.8 30.0 - 36.5 g/dL    RDW 12.3 11.5 - 14.5 %    PLATELET 547 950 - 278 K/uL    MPV 10.0 8.9 - 12.9 FL    NRBC 0.0 0  WBC    ABSOLUTE NRBC 0.00 0.00 - 0.01 K/uL    NEUTROPHILS 65 32 - 75 %    LYMPHOCYTES 17 12 - 49 %    MONOCYTES 15 (H) 5 - 13 %    EOSINOPHILS 3 0 - 7 %    BASOPHILS 0 0 - 1 %    IMMATURE GRANULOCYTES 1 (H) 0.0 - 0.5 %    ABS. NEUTROPHILS 5.1 1.8 - 8.0 K/UL    ABS. LYMPHOCYTES 1.3 0.8 - 3.5 K/UL    ABS. MONOCYTES 1.2 (H) 0.0 - 1.0 K/UL    ABS. EOSINOPHILS 0.2 0.0 - 0.4 K/UL    ABS. BASOPHILS 0.0 0.0 - 0.1 K/UL    ABS. IMM. GRANS. 0.1 (H) 0.00 - 0.04 K/UL    DF AUTOMATED         Data Review images and reports reviewed    Assessment:     Principal Problem:    Hernia, inguinal, recurrent, with obstruction (4/28/2018)    Active Problems:    Pancreatic mass (4/28/2018)      ECG abnormality (4/30/2018)        Plan/Recommendations/Medical Decision Making:     Continue present treatment   Ostomy teaching ongoing  Path shows colonic mass is adenocarcinoma, not arising in colon, c/w pancreatic primary. Chest CT shows diffuse metastatic pattern. Oncology and Palliative consults placed. No role for aggressive treatment in this unfortunate setting. Continue house for retention. Appreciate assistance from palliative care. Pt now DNR. Awaiting Rehab placement - ready for d/c from surgical standpoint. Precious Andrade.  Misael rTinidad MD, [facility]

## 2018-05-03 NOTE — PROGRESS NOTES
Palliative Medicine Consult  Gustavo: [phone] ([phone])    Patient Name: Minerva Hernandez  YOB: 1926    Date of Initial Consult: 5/1/18  Reason for Consult: care decisions  Requesting Provider: Dr. Iris Washington  Primary Care Physician: PROVIDER UNKNOWN     SUMMARY:   Minerva Hernandez is a 80 y.o. with a past history of arthritis, sleep apnea, GERD, inguinal hernia repair 40 years ago, who was admitted on 4/28/2018 from home with a diagnosis of nausea/vomiting/abdominal pain/hernia. Current medical issues leading to Palliative Medicine involvement include: Patient had surgery for incarcerated L inguinal hernia with SBO, s/p sigmoid resection, end colostomy -- preliminary path + pancreatic cancer. Pancreatic mass and bilateral pulmonary nodules noted on CT Scan. Prior to admission patient was independent in all ADLs and still driving. Patient's only son Natalie Parsons [facility]) lives locally. Patient also has a granddaughter who is about to finish RN training with [employer]. PALLIATIVE DIAGNOSES:   1. New diagnosis of metastatic pancreatic cancer. 2. POD #3 s/p repair incarcerated inguinal hernia/sigmoid resection/end colostomy  3. Post op abdominal pain, well controlled  4. Hearing impaired  5. Weakness   6. Debility  7. Counseling regarding goals of care and advance care planning      PLAN:   Brief Summary of Plan  -visited w/ pt in his rm. No family at bedside  -family meeting scheduled for 1000 was held. Son had planned to be present, but pt notes that he is not feeling well. Zayra Frazier (ANDERSON) had called to inform pt earlier this AM  Peter Blanca (ESAU) and I were present from our team  -goals of care and advance care planning again discussed. See below. -we will continue to establish therapeutic alliance as well as provide psychosocial support    1.  Goals of Care/Goals of Medical Treatment- confirmed  Pt wishes for full, restorative treatment and all measures that support this w/ following exception and clarification:    NO to intubation if respiratory distress or failure outside of cardiopulmonary arrest  YES to pressors  YES to CPAP or BiPAP    This is w/ clear understanding that his condition is limiting and will limit what can be done medically. 2. Advance Care Planning-   Patient confirms code status as DO NOT ATTEMPT RESUSCITATION (DNAR) / [address] (AND): If pt has cardiopulmonary arrest, then   NO to chest compressions  NO to cardiac shock  NO to ACLS meds  NO to intubation    EMR order entered to reflect the above. \"DDNR\" form signed by pt. Placed on paper chart. Copies made and given to pt.  1 copy retained by our team for records. Original placed on chart. Pt has AMD on file. Reviewed. ACP tab info reflects MPOAs. Pink sheet completed to reflect the above. 3. Information Sharing-    05/03/2018  Pt said that he has been having an okay day. He has been active. He has been up and about. He had bath and is now shaving. Pt wished to speak about code status. He clearly relays that he wants it to be DNAR/AND. In terms of medical tx, he does not want intubation if respiratory distress or failure outside of cardiopulmonary arrest.   Pt signed \"DDNR\" form. We offered to call his son, but pt said he would call and talk w/ him. For reference, Angelina's phone number is   [phone]    05/02/2018  I apologized for the potential confusion w/ my setting up family meeting for today at 80. This is in light of Dr. Keny Giles having visited w/ them yesterday. I relayed that the meeting may be good to discuss any questions and concerns. Pt and family were amenable to having this family meeting. Initial conversation was mostly the pt's speaking. Son asked what our input was regarding the plan to go to SNF. It was shared that we would support them w/ whatever decision is made.      We spoke about maintaining hope w/ them of his overall condition getting better and harmonizing that with what we know medically. This led to speaking about range of medical treatment. Separate from this, we discussed code status. Pt is clear on his wishes for the present time but did express that he would think about what we discuss some more; he would d/w his family. Please refer to Mary's note from today. Alvin Malin is graduating from nursing school on 05/17/2018. Pt wishes to go to [facility] for rehab. He has been there before and felt that the therapy provided there was to his   \"satisfaction\". Pt expressed that he would like to do what he was doing before. But he also said that he might not be able to. Later in the conversation, he expressed that he would not be happy if he were \"bedridden\". He would like to get out and about. He would like to get back to driving. Pt has been driving his son to work ([employer] Son works [phone]. He has worked for [employer] for ~30 yrs. He shared that he got a \"DUI\" and lost his license. He is on leave right now to be w/ his father. Questions and concerns addressed. Supportive listening provided. 4. Psychosocial Support- We will continue to support patient and family during this difficult hospitalization    5. Spiritual- at this time, there are no apparent spiritual concerns. 6. Symptom Management- at this time, there does not appear to be symptom management for which our assistance is needed. Discussed w/ Stephany Suarez, AJIT, and asked to relay to bedside RN. Discussed w/ Dr. Tobi Brown, general surgery and primary attending. Discussed w/ Chastity Kemp, yesterday. Time and input appreciated. I have discussed with our palliative care IDT. Thank you for this consult that has provided us with the opportunity to be involved in this patient's care. We will continue to follow along with you.   Should you have any questions or concerns prior to our next visit at the bedside, please do not hesitate to contact us at [phone]). Mitchell Black MD  Palliative Care Team     GOALS OF CARE / TREATMENT PREFERENCES:     GOALS OF CARE:  Patient/Health Care Proxy Stated Goals: Other (comment)  See above    TREATMENT PREFERENCES:   Code Status: DNR   Patient confirms code status as DO NOT ATTEMPT RESUSCITATION (DNAR) / [address] (AND): If pt has cardiopulmonary arrest, then   NO to chest compressions  NO to cardiac shock  NO to ACLS meds  NO to intubation    Advance Care Planning:  Advance Care Planning 5/3/2018   Patient's Healthcare Decision Maker is: Named in scanned ACP document   Primary Decision Maker Name Larisa De La Rosa   Primary Decision Maker Phone Number [phone]   Primary Decision Maker Relationship to Patient Adult child   Secondary Decision Maker Name Mesfin Manning Phone Number [phone]   Secondary Decision Maker Relationship to Patient Other relative   Confirm Advance Directive Yes, on file   Patient Would Like to Complete Advance Directive No   Does the patient have other document types Do Not Resuscitate       Medical Interventions: Limited additional interventions   Other Instructions: Other:    As far as possible, the palliative care team has discussed with patient / health care proxy about goals of care / treatment preferences for patient. HISTORY:     History obtained from: patient, EMR    CHIEF COMPLAINT: none at this time    HPI/SUBJECTIVE:    The patient is:   [x] Verbal and participatory  [] Non-participatory due to:   No o/n events or issues. Pt has no complaints. Got up early and was walking about his room. He has had a bath and was just starting to shave his face. 80year old male, previously in very good health, was admitted with nausea, vomiting and abdominal pain. He was found to have incarcerated inguinal hernia.   This had been repaired once many years ago, but was still ongoing problem-- patient always able to reduce hernia and hadn't really caused trouble before this admission        Clinical Pain Assessment (nonverbal scale for severity on nonverbal patients):   Clinical Pain Assessment  Severity: 0          Duration: for how long has pt been experiencing pain (e.g., 2 days, 1 month, years)  Frequency: how often pain is an issue (e.g., several times per day, once every few days, constant)     FUNCTIONAL ASSESSMENT:     Palliative Performance Scale (PPS):  PPS: 60       PSYCHOSOCIAL/SPIRITUAL SCREENING:     Palliative IDT has assessed this patient for cultural preferences / practices and a referral made as appropriate to needs (Cultural Services, Patient Advocacy, Ethics, etc.)    Advance Care Planning:  Advance Care Planning 5/3/2018   Patient's Healthcare Decision Maker is: Named in scanned ACP document   Primary Decision Maker Name Noelle Bello   Primary Decision Maker Phone Number [phone]   Primary Decision Maker Relationship to Patient Adult child   Secondary Decision Maker Name Mesfin Manning Phone Number [phone]   Secondary Decision Maker Relationship to Patient Other relative   Confirm Advance Directive Yes, on file   Patient Would Like to Complete Advance Directive No   Does the patient have other document types Do Not Resuscitate       Any spiritual / Christianity concerns:  [] Yes /  [x] No    Caregiver Burnout:  [] Yes /  [] No /  [x] No Caregiver Present      Anticipatory grief assessment:   [] Normal  / [] Maladaptive       ESAS Anxiety: Anxiety: 0    ESAS Depression: Depression: 0        REVIEW OF SYSTEMS:     Positive and pertinent negative findings in ROS are noted above in HPI. The following systems were [x] reviewed / [] unable to be reviewed as noted in HPI  Other findings are noted below.   Systems: constitutional, ears/nose/mouth/throat, respiratory, gastrointestinal, genitourinary, musculoskeletal, integumentary, neurologic, psychiatric, endocrine. Positive findings noted below. Modified ESAS Completed by: provider   Fatigue: 2 Drowsiness: 0   Depression: 0 Pain: 0   Anxiety: 0 Nausea: 0   Anorexia: 4 Dyspnea: 0     Constipation: No              PHYSICAL EXAM:     From RN flowsheet:  Wt Readings from Last 3 Encounters:   04/28/18 193 lb 12.8 oz (87.9 kg)   02/27/14 202 lb (91.6 kg)     Blood pressure 110/55, pulse 67, temperature 98.7 °F (37.1 °C), resp. rate 20, height 6' (1.829 m), weight 193 lb 12.8 oz (87.9 kg), SpO2 97 %. Pain Scale 1: Numeric (0 - 10)  Pain Intensity 1: 0  Pain Onset 1: post op  Pain Location 1: Groin  Pain Orientation 1: Left  Pain Description 1: Aching  Pain Intervention(s) 1: Medication (see MAR)  Last bowel movement, if known:     Gen: pleasant, comfortable sitting in chair at bedside, in NAD  HEENT: NC/AT, MMM  Resp: breathing unlabored, symmetric  Neuro: awake, alert, no obvious confusion  Has difficulty hearing  Was in the middle of using electric shaver on R side of face  Psych: mildly blunted affect, no obvious delusions or hallucinations  No obvious si/sx's of anxiety or agitation  Per Appelbaum criteria, pt demonstrates capacity regarding range of medical treatment, code status, dispo planning. More specifically,   -pt demonstrates understanding of the decision/choice communicated and is able to indicate a decision/choice  -pt demonstrates understanding of relevant information. Pt is able to paraphrase shared information.    -pt appreciates situation and its consequences.   -pt demonstrates the ability to reason about options including comparing options and consequences and provide reasons for selection of option       HISTORY:     Principal Problem:    Hernia, inguinal, recurrent, with obstruction (4/28/2018)    Active Problems:    Pancreatic mass (4/28/2018)      ECG abnormality (4/30/2018)      Past Medical History:   Diagnosis Date    Arthritis     ECG abnormality 4/30/2018    GERD (gastroesophageal reflux disease)     High cholesterol     Hypertension     Other ill-defined conditions(799.89) ~2000    arm tingling to ER, per pt states was not a stroke, but has been on plavix and aggrenox since. currently on plavix    Sleep apnea with use of continuous positive airway pressure (CPAP)       Past Surgical History:   Procedure Laterality Date    HX CATARACT REMOVAL Bilateral 2008 and 2012    HX HERNIA REPAIR Left     HX ORTHOPAEDIC Right     elbow     TOTAL KNEE ARTHROPLASTY Left 1999    TOTAL KNEE ARTHROPLASTY Right 2011      History reviewed. No pertinent family history. History reviewed, no pertinent family history.   Social History   Substance Use Topics    Smoking status: Never Smoker    Smokeless tobacco: Not on file    Alcohol use No     Allergies   Allergen Reactions    Aleve [Naproxen Sodium] Swelling     Throat swelling      Current Facility-Administered Medications   Medication Dose Route Frequency    tamsulosin (FLOMAX) capsule 0.4 mg  0.4 mg Oral DAILY    phenazopyridine (PYRIDIUM) tablet 200 mg  200 mg Oral TID PRN    pravastatin (PRAVACHOL) tablet 40 mg  40 mg Oral QHS    labetalol (NORMODYNE) tablet 200 mg  200 mg Oral BID    amLODIPine (NORVASC) tablet 10 mg  10 mg Oral DAILY    hydroCHLOROthiazide (HYDRODIURIL) tablet 25 mg  25 mg Oral DAILY    sodium chloride (NS) flush 5-10 mL  5-10 mL IntraVENous Q8H    sodium chloride (NS) flush 5-10 mL  5-10 mL IntraVENous PRN    0.9% sodium chloride infusion  50 mL/hr IntraVENous CONTINUOUS    ondansetron (ZOFRAN) injection 4 mg  4 mg IntraVENous Q4H PRN    piperacillin-tazobactam (ZOSYN) 3.375 g in 0.9% sodium chloride (MBP/ADV) 100 mL  3.375 g IntraVENous Q8H    hydrALAZINE (APRESOLINE) 20 mg/mL injection 10 mg  10 mg IntraVENous Q6H PRN    sodium chloride (NS) flush 5-10 mL  5-10 mL IntraVENous PRN    morphine injection 2 mg  2 mg IntraVENous Q2H PRN    enoxaparin (LOVENOX) injection 40 mg  40 mg SubCUTAneous DAILY          LAB AND IMAGING FINDINGS:     Lab Results   Component Value Date/Time    WBC 7.8 05/03/2018 04:26 AM    HGB 11.3 (L) 05/03/2018 04:26 AM    PLATELET 529 81/94/7372 04:26 AM     Lab Results   Component Value Date/Time    Sodium 135 (L) 05/03/2018 04:26 AM    Potassium 3.5 05/03/2018 04:26 AM    Chloride 104 05/03/2018 04:26 AM    CO2 22 05/03/2018 04:26 AM    BUN 24 (H) 05/03/2018 04:26 AM    Creatinine 1.11 05/03/2018 04:26 AM    Calcium 8.1 (L) 05/03/2018 04:26 AM    Magnesium 1.8 05/03/2018 04:26 AM      Lab Results   Component Value Date/Time    AST (SGOT) 15 05/01/2018 03:27 AM    Alk. phosphatase 57 05/01/2018 03:27 AM    Protein, total 5.7 (L) 05/01/2018 03:27 AM    Albumin 2.4 (L) 05/01/2018 03:27 AM    Globulin 3.3 05/01/2018 03:27 AM     Lab Results   Component Value Date/Time    INR 1.1 04/29/2018 10:28 AM    Prothrombin time 11.0 04/29/2018 10:28 AM    aPTT 25.4 11/19/2009 02:10 PM      No results found for: IRON, FE, TIBC, IBCT, PSAT, FERR   No results found for: PH, PCO2, PO2  No components found for: [place]   Lab Results   Component Value Date/Time    CK 32 (L) 04/28/2018 08:34 AM    CK - MB 2.0 04/28/2018 08:34 AM                Total time: 35 min   Counseling / coordination time, spent as noted above: 20 min  > 50% counseling / coordination?: yes    Prolonged service was provided for  []30 min   []75 min in face to face time in the presence of the patient, spent as noted above. Time Start:   Time End:   Note: this can only be billed with 96465 (initial) or 12409 (follow up). If multiple start / stop times, list each separately.

## 2018-05-04 NOTE — PROGRESS NOTES
PCU SHIFT NURSING NOTE      Bedside and Verbal shift change report given to Ashley Golden RN (oncoming nurse) by Ely Garcia RN (offgoing nurse). Report included the following information SBAR, Kardex, ED Summary, Procedure Summary, Intake/Output, MAR, Accordion and Recent Results. Shift Summary:   Patient A/Ox4. VSS. See doc flow sheet for assessment. Patient ostomy is patent, pink, and bag changed by wound care/ostomy nurse. Desir in place draining clear, stacey, urine. Patient up to the chair once today. Ambulates well one assist.  Uneventful shift. 1450 Notified by Rossy Hightower, Care management, that patient is ready for discharge. [email or web address] report to [address] [place] [address] [facility] Gave discharge instructions to patient and his son. Patient verbalized understanding of discharge instructions and signed documentation. Admission Date 4/28/2018   Admission Diagnosis Hernia, inguinal, recurrent, with obstruction  Pancreatic mass  inguinal hernia left  abnormal ct   Consults IP CONSULT TO GENERAL SURGERY  IP CONSULT TO HOSPITALIST  IP CONSULT TO GASTROENTEROLOGY  IP CONSULT TO HEMATOLOGY  IP CONSULT TO PALLIATIVE CARE - PROVIDER        Consults   [x]PT   [x]OT   []Speech   [x]Case Management      [x] Palliative      Cardiac Monitoring Order   [x]Yes   []No     IV drips   []Yes    Drip:                            Dose:  Drip:                            Dose:  Drip:                            Dose:   [x]No     GI Prophylaxis   [x]Yes   []No         DVT Prophylaxis   SCDs:  Sequential Compression Device: Bilateral          Rafa stockings:  Graduated Compression Stockings: Bilateral      [x] Medication   []Contraindicated   []None      Activity Level Activity Level: Bath Room Privileges     Activity Assistance: Partial (one person)   Purposeful Rounding every 1-2 hour?    [x]Yes   Richardson Score  Total Score: 3   Bed Alarm (If score 3 or >)   [x]Yes   [] Refused (See signed refusal form in chart)   Trevor Score Trevor Score: 19   Trevor Score (if score 14 or less)   []PMT consult   [x]Wound Care consult      []Specialty bed   [] Nutrition consult          Needs prior to discharge:   Home O2 required:    []Yes   [x]No    If yes, how much O2 required? Other:    Last Bowel Movement: Last Bowel Movement Date: 05/03/18 (colostomy)      Influenza Vaccine Received Flu Vaccine for Current Season (usually Sept-March): Yes        Pneumonia Vaccine           Diet Active Orders   Diet    DIET GI LITE (POST SURGICAL)      LDAs               Peripheral IV 04/28/18 Right Antecubital (Active)   Site Assessment Clean, dry, & intact 5/4/2018  3:41 AM   Phlebitis Assessment 0 5/4/2018  3:41 AM   Infiltration Assessment 0 5/4/2018  3:41 AM   Dressing Status Clean, dry, & intact 5/4/2018  3:41 AM   Dressing Type Transparent 5/4/2018  3:41 AM   Hub Color/Line Status Pink 5/4/2018  3:41 AM   Action Taken Open ports on tubing capped 5/2/2018  7:32 AM   Alcohol Cap Used Yes 5/1/2018 11:26 PM       Peripheral IV 05/02/18 Left Forearm (Active)   Site Assessment Clean, dry, & intact 5/4/2018  3:41 AM   Phlebitis Assessment 0 5/4/2018  3:41 AM   Infiltration Assessment 0 5/4/2018  3:41 AM   Dressing Status Clean, dry, & intact 5/4/2018  3:41 AM   Dressing Type Transparent 5/4/2018  3:41 AM   Hub Color/Line Status Blue 5/4/2018  3:41 AM          Colostomy 04/28/18 Lower ; Left Abdomen (Active)   Drainage Color Brown 5/3/2018  2:22 PM   Site Assessment Clean, dry, intact 5/3/2018  2:22 PM   Treatment Site care 5/3/2018  2:22 PM   Output (ml) 0 mL 5/3/2018  2:22 PM                Urinary Catheter Urinary Catheter 05/02/18 Coude-Indications for Use: Acute urinary retention/bladder outlet obstruction  [REMOVED] Urinary Catheter 04/28/18 2- way-Indications for Use: Surgery    Intake & Output   Date 05/03/18 0700 - 05/04/18 0659 05/04/18 0700 - 05/05/18 0659   Shift 3872-6437 0067-0400 24 Hour Total 6614-6799 8934-3328 24 Hour Total   I  N  T  MONIKA  K  E P[address] 960  960         P. O. 960  960       I.V.  (mL/kg/hr) 1811.7  (1.7) 340  (0.3) 2151.7  (1)         I.V.  340 340         Volume (0.9% sodium chloride infusion) 1411.7  1411.7         Volume (piperacillin-tazobactam (ZOSYN) 3.375 g in 0.9% sodium chloride (MBP/ADV) 100 mL) 400  400       Shift Total  (mL/kg) 2771.7  (31.5) 340  (3.9) 3111.7  (35.4)      O  U  T  P  U  T   Urine  (mL/kg/hr) 300  (0.3) 675  (0.6) 975  (0.5)         Urine Output (mL) (Urinary Catheter 05/02/18 Coude) 300 675 975       Stool 400 100 500         Stool  100 100         Output (ml) (Colostomy 04/28/18 Lower ; Left Abdomen) 400  400       Shift Total  (mL/kg) 700  (8) 775  (8.8) 1475  (16.8)      NET 2071.7 -435 1636.7      Weight (kg) 87.9 87.9 87.9 87.9 87.9 87.9         Readmission Risk Assessment Tool Score High Risk            22       Total Score        3 Has Seen PCP in Last 6 Months (Yes=3, No=0)    3 Patient Length of Stay (>5 days = 3)    5 Pt. Coverage (Medicare=5 , Medicaid, or Self-Pay=4)    11 Charlson Comorbidity Score (Age + Comorbid Conditions)        Criteria that do not apply:    . Living with Significant Other. Assisted Living. LTAC. SNF.  or   Rehab    IP Visits Last 12 Months (1-3=4, 4=9, >4=11)       Expected Length of Stay 3d 19h   Actual Length of Stay 6

## 2018-05-04 NOTE — PROGRESS NOTES
Palliative Medicine Consult  Gustavo: [phone] ([phone])    Patient Name: Derek Michaels  YOB: 1926    Date of Initial Consult: 5/1/18  Reason for Consult: care decisions  Requesting Provider: Dr. Jackie Crespo  Primary Care Physician: PROVIDER UNKNOWN     SUMMARY:   Derek Michaels is a 80 y.o. with a past history of arthritis, sleep apnea, GERD, inguinal hernia repair 40 years ago, who was admitted on 4/28/2018 from home with a diagnosis of nausea/vomiting/abdominal pain/hernia. Current medical issues leading to Palliative Medicine involvement include: Patient had surgery for incarcerated L inguinal hernia with SBO, s/p sigmoid resection, end colostomy -- preliminary path + pancreatic cancer. Pancreatic mass and bilateral pulmonary nodules noted on CT Scan. Prior to admission patient was independent in all ADLs and still driving. Patient's only son Christine [facility]) lives locally. Patient also has a granddaughter who is about to finish RN training with [employer]. PALLIATIVE DIAGNOSES:   1. New diagnosis of metastatic pancreatic cancer. 2. POD #3 s/p repair incarcerated inguinal hernia/sigmoid resection/end colostomy  3. Post op abdominal pain, well controlled  4. Hearing impaired  5. Weakness   6. Debility  7. Counseling regarding goals of care and advance care planning      PLAN:   Brief Summary of Plan  -visited w/ pt in his rm. Son at bedside  -goals of care and advance care planning confirmed.   -pt is being discharged to [facility] today    1. Goals of Care/Goals of Medical Treatment- confirmed  Pt wishes for full, restorative treatment and all measures that support this w/ following exception and clarification:    NO to intubation if respiratory distress or failure outside of cardiopulmonary arrest  YES to pressors  YES to CPAP or BiPAP    This is w/ clear understanding that his condition is limiting and will limit what can be done medically.      2. Advance Care Planning-   Patient confirms code status as DO NOT ATTEMPT RESUSCITATION (DNAR) / [address] (AND): If pt has cardiopulmonary arrest, then   NO to chest compressions  NO to cardiac shock  NO to ACLS meds  NO to intubation    EMR order entered to reflect the above. \"DDNR\" form signed by pt. Placed on paper chart. Copies made and given to pt.  1 copy retained by our team for records. Original placed on chart. Pt has AMD on file. Reviewed. ACP tab info reflects MPOAs. Pink sheet completed to reflect the above. 3. Information Sharing-  05/04/2018  Son is feeling better today. Son asked about the [facility]" form and if it will \"follow\" him to [facility]. He also asked if the [facility] would get it. Pt had no questions or concerns. Alvinaditya Malin was here visiting earlier. Pt has been up and about in his rm. He feels he is doing ok today. He is eager to be d/c'ed and go to [facility]. Questions and concerns addressed. Supportive listening provided. 05/03/2018  Pt said that he has been having an okay day. He has been active. He has been up and about. He had bath and is now shaving. Pt wished to speak about code status. He clearly relays that he wants it to be DNAR/AND. In terms of medical tx, he does not want intubation if respiratory distress or failure outside of cardiopulmonary arrest.   Pt signed \"DDNR\" form. We offered to call his son, but pt said he would call and talk w/ him. For reference, Angelina's phone number is   [phone]    05/02/2018  I apologized for the potential confusion w/ my setting up family meeting for today at 80. This is in light of Dr. Alexis Vasquez having visited w/ them yesterday. I relayed that the meeting may be good to discuss any questions and concerns. Pt and family were amenable to having this family meeting. Initial conversation was mostly the pt's speaking. Son asked what our input was regarding the plan to go to SNF. It was shared that we would support them w/ whatever decision is made. We spoke about maintaining hope w/ them of his overall condition getting better and harmonizing that with what we know medically. This led to speaking about range of medical treatment. Separate from this, we discussed code status. Pt is clear on his wishes for the present time but did express that he would think about what we discuss some more; he would d/w his family. Please refer to Mary's note from today. Felipe Wong is graduating from nursing school on 05/17/2018. Pt wishes to go to [facility] for rehab. He has been there before and felt that the therapy provided there was to his   \"satisfaction\". Pt expressed that he would like to do what he was doing before. But he also said that he might not be able to. Later in the conversation, he expressed that he would not be happy if he were \"bedridden\". He would like to get out and about. He would like to get back to driving. Pt has been driving his son to work ([employer]). Son works [id number]. He has worked for [employer] for ~30 yrs. He shared that he got a \"DUI\" and lost his license. He is on leave right now to be w/ his father. Questions and concerns addressed. Supportive listening provided. 4. Psychosocial Support- We will continue to support patient and family during this difficult hospitalization    5. Spiritual- at this time, there are no apparent spiritual concerns. 6. Symptom Management- at this time, there does not appear to be symptom management for which our assistance is needed. 7. Other- there was clarification needed regarding Desir. Pt was under impression that he would be going to [facility] w/ it in place. Bedside RN noted that in IDR it was relayed to her the same. There was an order to d/c Karlee. D/w primary attending. Confirmed that it should be in place. Relayed to bedside RN. Discussed w/ Roxann Nolen RN.   Discussed w/ Dr. Webster Litten, general surgery and primary attending. Time and input appreciated. I have discussed with our palliative care IDT. Thank you for this consult that has provided us with the opportunity to be involved in this patient's care. We will continue to follow along with you. Should you have any questions or concerns prior to our next visit at the bedside, please do not hesitate to contact us at [phone] Inge Correa MD  Palliative Care Team     GOALS OF CARE / TREATMENT PREFERENCES:     GOALS OF CARE:  Patient/Health Care Proxy Stated Goals: Other (comment)  See above    TREATMENT PREFERENCES:   Code Status: DNR   Patient confirms code status as DO NOT ATTEMPT RESUSCITATION (DNAR) / [address] (AND): If pt has cardiopulmonary arrest, then   NO to chest compressions  NO to cardiac shock  NO to ACLS meds  NO to intubation    Advance Care Planning:  Advance Care Planning 5/3/2018   Patient's Healthcare Decision Maker is: Named in scanned ACP document   Primary Decision Maker Name Tanisha Guzman   Primary Decision Maker Phone Number [phone]   Primary Decision Maker Relationship to Patient Adult child   Secondary Decision Maker Name Mesfin Manning Phone Number [phone]   Secondary Decision Maker Relationship to Patient Other relative   Confirm Advance Directive Yes, on file   Patient Would Like to Complete Advance Directive No   Does the patient have other document types Do Not Resuscitate       Medical Interventions: Limited additional interventions   Other Instructions: Other:    As far as possible, the palliative care team has discussed with patient / health care proxy about goals of care / treatment preferences for patient.            HISTORY:     History obtained from: patient, EMR    CHIEF COMPLAINT: none at this time    HPI/SUBJECTIVE:    The patient is:   [x] Verbal and participatory  [] Non-participatory due to: No o/n events or issues. Pt has no complaints. Got up early and was walking about his room. He has had a bath and was just starting to shave his face. 80year old male, previously in very good health, was admitted with nausea, vomiting and abdominal pain. He was found to have incarcerated inguinal hernia.   This had been repaired once many years ago, but was still ongoing problem-- patient always able to reduce hernia and hadn't really caused trouble before this admission        Clinical Pain Assessment (nonverbal scale for severity on nonverbal patients):   Clinical Pain Assessment  Severity: 0          Duration: for how long has pt been experiencing pain (e.g., 2 days, 1 month, years)  Frequency: how often pain is an issue (e.g., several times per day, once every few days, constant)     FUNCTIONAL ASSESSMENT:     Palliative Performance Scale (PPS):  PPS: 60       PSYCHOSOCIAL/SPIRITUAL SCREENING:     Palliative IDT has assessed this patient for cultural preferences / practices and a referral made as appropriate to needs (Cultural Services, Patient Advocacy, Ethics, etc.)    Advance Care Planning:  Advance Care Planning 5/3/2018   Patient's Healthcare Decision Maker is: Named in scanned ACP document   Primary Decision Maker Name Trinity Desai   Primary Decision Maker Phone Number [phone]   Primary Decision Maker Relationship to Patient Adult child   Secondary Decision Maker Name Mesfin Manning Phone Number [phone]   Secondary Decision Maker Relationship to Patient Other relative   Confirm Advance Directive Yes, on file   Patient Would Like to Complete Advance Directive No   Does the patient have other document types Do Not Resuscitate       Any spiritual / Religion concerns:  [] Yes /  [x] No    Caregiver Burnout:  [] Yes /  [] No /  [x] No Caregiver Present      Anticipatory grief assessment:   [] Normal  / [] Maladaptive       ESAS Anxiety: Anxiety: 0    ESAS Depression: Depression: 0        REVIEW OF SYSTEMS:     Positive and pertinent negative findings in ROS are noted above in HPI. The following systems were [x] reviewed / [] unable to be reviewed as noted in HPI  Other findings are noted below. Systems: constitutional, ears/nose/mouth/throat, respiratory, gastrointestinal, genitourinary, musculoskeletal, integumentary, neurologic, psychiatric, endocrine. Positive findings noted below. Modified ESAS Completed by: provider   Fatigue: 2 Drowsiness: 0   Depression: 0 Pain: 0   Anxiety: 0 Nausea: 0   Anorexia: 4 Dyspnea: 0     Constipation: No              PHYSICAL EXAM:     From RN flowsheet:  Wt Readings from Last 3 Encounters:   04/28/18 193 lb 12.8 oz (87.9 kg)   02/27/14 202 lb (91.6 kg)     Blood pressure 124/69, pulse 71, temperature 99.3 °F (37.4 °C), resp. rate 20, height 6' (1.829 m), weight 193 lb 12.8 oz (87.9 kg), SpO2 97 %. Pain Scale 1: Numeric (0 - 10)  Pain Intensity 1: 0  Pain Onset 1: post op  Pain Location 1: Groin  Pain Orientation 1: Left  Pain Description 1: Aching  Pain Intervention(s) 1: Medication (see MAR)  Last bowel movement, if known:     Gen: pleasant, comfortable sitting up in bed, in NAD  HEENT: NC/AT, MMM  Resp: breathing unlabored, symmetric  Neuro: awake, alert, no obvious confusion  Has difficulty hearing  Psych: less mildly blunted affect, no obvious delusions or hallucinations  No obvious si/sx's of anxiety or agitation  Per Appelbaum criteria, pt demonstrates capacity regarding range of medical treatment, code status, dispo planning. More specifically,   -pt demonstrates understanding of the decision/choice communicated and is able to indicate a decision/choice  -pt demonstrates understanding of relevant information. Pt is able to paraphrase shared information.    -pt appreciates situation and its consequences.   -pt demonstrates the ability to reason about options including comparing options and consequences and provide reasons for selection of option       HISTORY:     Principal Problem:    Hernia, inguinal, recurrent, with obstruction (4/28/2018)    Active Problems:    Pancreatic mass (4/28/2018)      ECG abnormality (4/30/2018)      HTN (hypertension) (5/3/2018)      Primary pancreatic cancer with metastasis to other site [facility]) (5/3/2018)      Past Medical History:   Diagnosis Date    Arthritis     ECG abnormality 4/30/2018    GERD (gastroesophageal reflux disease)     High cholesterol     Hypertension     Other ill-defined conditions(799.89) ~2000    arm tingling to ER, per pt states was not a stroke, but has been on plavix and aggrenox since. currently on plavix    Sleep apnea with use of continuous positive airway pressure (CPAP)       Past Surgical History:   Procedure Laterality Date    HX CATARACT REMOVAL Bilateral 2008 and 2012    HX HERNIA REPAIR Left     HX ORTHOPAEDIC Right     elbow     TOTAL KNEE ARTHROPLASTY Left 1999    TOTAL KNEE ARTHROPLASTY Right 2011      History reviewed. No pertinent family history. History reviewed, no pertinent family history.   Social History   Substance Use Topics    Smoking status: Never Smoker    Smokeless tobacco: Not on file    Alcohol use No     Allergies   Allergen Reactions    Aleve [Naproxen Sodium] Swelling     Throat swelling      Current Facility-Administered Medications   Medication Dose Route Frequency    tamsulosin (FLOMAX) capsule 0.4 mg  0.4 mg Oral DAILY    phenazopyridine (PYRIDIUM) tablet 200 mg  200 mg Oral TID PRN    pravastatin (PRAVACHOL) tablet 40 mg  40 mg Oral QHS    labetalol (NORMODYNE) tablet 200 mg  200 mg Oral BID    amLODIPine (NORVASC) tablet 10 mg  10 mg Oral DAILY    hydroCHLOROthiazide (HYDRODIURIL) tablet 25 mg  25 mg Oral DAILY    sodium chloride (NS) flush 5-10 mL  5-10 mL IntraVENous Q8H    sodium chloride (NS) flush 5-10 mL  5-10 mL IntraVENous PRN    ondansetron (ZOFRAN) injection 4 mg  4 mg IntraVENous Q4H PRN  piperacillin-tazobactam (ZOSYN) 3.375 g in 0.9% sodium chloride (MBP/ADV) 100 mL  3.375 g IntraVENous Q8H    hydrALAZINE (APRESOLINE) 20 mg/mL injection 10 mg  10 mg IntraVENous Q6H PRN    sodium chloride (NS) flush 5-10 mL  5-10 mL IntraVENous PRN    morphine injection 2 mg  2 mg IntraVENous Q2H PRN    enoxaparin (LOVENOX) injection 40 mg  40 mg SubCUTAneous DAILY          LAB AND IMAGING FINDINGS:     Lab Results   Component Value Date/Time    WBC 7.8 05/03/2018 04:26 AM    HGB 11.3 (L) 05/03/2018 04:26 AM    PLATELET 359 03/01/1327 04:26 AM     Lab Results   Component Value Date/Time    Sodium 135 (L) 05/03/2018 04:26 AM    Potassium 3.5 05/03/2018 04:26 AM    Chloride 104 05/03/2018 04:26 AM    CO2 22 05/03/2018 04:26 AM    BUN 24 (H) 05/03/2018 04:26 AM    Creatinine 1.11 05/03/2018 04:26 AM    Calcium 8.1 (L) 05/03/2018 04:26 AM    Magnesium 1.8 05/03/2018 04:26 AM      Lab Results   Component Value Date/Time    AST (SGOT) 15 05/01/2018 03:27 AM    Alk. phosphatase 57 05/01/2018 03:27 AM    Protein, total 5.7 (L) 05/01/2018 03:27 AM    Albumin 2.4 (L) 05/01/2018 03:27 AM    Globulin 3.3 05/01/2018 03:27 AM     Lab Results   Component Value Date/Time    INR 1.1 04/29/2018 10:28 AM    Prothrombin time 11.0 04/29/2018 10:28 AM    aPTT 25.4 11/19/2009 02:10 PM      No results found for: IRON, FE, TIBC, IBCT, PSAT, FERR   No results found for: PH, PCO2, PO2  No components found for: [place]   Lab Results   Component Value Date/Time    CK 32 (L) 04/28/2018 08:34 AM    CK - MB 2.0 04/28/2018 08:34 AM                Total time: 30 min   Counseling / coordination time, spent as noted above: 20 min  > 50% counseling / coordination?: yes    Prolonged service was provided for  []30 min   []75 min in face to face time in the presence of the patient, spent as noted above. Time Start:   Time End:   Note: this can only be billed with 26996 (initial) or 90907 (follow up).   If multiple start / stop times, list each separately.

## 2018-05-04 NOTE — DISCHARGE INSTRUCTIONS
Open Bowel Resection: What to Expect at [address] [place] [address] are likely to have pain that comes and goes for the next few days after bowel surgery. You may have bowel cramps, and your cut (incision) may hurt. You may also feel like you have the flu. You may have a low fever and feel tired and nauseated. This is common. You should feel better after a week and will probably be back to normal in 2 to 3 weeks. This care sheet gives you a general idea about how long it will take for you to recover. But each person recovers at a different pace. Follow the steps below to get better as quickly as possible. How can you care for yourself at home? Activity  · Rest when you feel tired. Getting enough sleep will help you recover. · Try to walk each day. Start by walking a little more than you did the day before. Bit by bit, increase the amount you walk. Walking boosts blood flow and helps prevent pneumonia and constipation. · Avoid strenuous activities, such as biking, jogging, weight lifting, or aerobic exercise, until your doctor says it is okay. · Ask your doctor when you can drive again. · You will probably need to take 3 to 4 weeks off from work. It depends on the type of work you do and how you feel. You may need to take off 4 to 6 weeks if you lift heavy objects in your job. · You may shower 24 to 48 hours after surgery, if your doctor says it is okay. Pat the cut (incision) dry. Do not take a bath for the first 2 weeks, or until your doctor tells you it is okay. · Ask your doctor when it is okay for you to have sex. Diet  · You may not have much appetite after the surgery. But try to eat a healthy diet. Your doctor will tell you about any foods you should not eat. · Eat a low-fiber diet for several weeks after surgery. Eat many small meals throughout the day. Add high-fiber foods a little at a time. · Eat yogurt. It puts good bacteria into your colon and helps prevent diarrhea.   · Try to avoid nuts, seeds, and corn for a while. They may be hard to digest.  · You may need to take vitamins that contain sodium and potassium. Ask your doctor. · Drink plenty of fluids to avoid becoming dehydrated. Medicines  · Your doctor will tell you if and when you can restart your medicines. He or she will also give you instructions about taking any new medicines. · If you take blood thinners, such as warfarin (Coumadin), clopidogrel (Plavix), or aspirin, be sure to talk to your doctor. He or she will tell you if and when to start taking those medicines again. Make sure that you understand exactly what your doctor wants you to do. · Take pain medicines exactly as directed. ¨ If the doctor gave you a prescription medicine for pain, take it as prescribed. ¨ If you are not taking a prescription pain medicine, ask your doctor if you can take an over-the-counter medicine. ¨ Do not take two or more pain medicines at the same time unless the doctor told you to. Many pain medicines have acetaminophen, which is Tylenol. Too much acetaminophen (Tylenol) can be harmful. · If you think your pain medicine is making you sick to your stomach:  ¨ Take your medicine after meals (unless your doctor tells you not to). ¨ Ask your doctor for a different pain medicine. · If your doctor prescribed antibiotics, take them as directed. Do not stop taking them just because you feel better. You need to take the full course of antibiotics. · You may need to take some medicines in a different form. You will be told whether to crush pills or take a liquid form of the medicine. · If your doctor gives you a stool softener, take it as directed. Incision care  · If you have strips of tape on the incision, leave the tape on for a week or until it falls off. · Wash the area daily with warm, soapy water, and pat it dry. Follow-up care is a key part of your treatment and safety.  Be sure to make and go to all appointments, and call your doctor if you are having problems. It's also a good idea to know your test results and keep a list of the medicines you take. When should you call for help? Call 911 anytime you think you may need emergency care. For example, call if:  · You passed out (lost consciousness). · You have sudden chest pain and shortness of breath, or you cough up blood. · You have severe pain in your belly. Call your doctor now or seek immediate medical care if:  · You are sick to your stomach and cannot drink fluids or keep them down. · You have signs of a blood clot, such as:  ¨ Pain in your calf, back of the knee, thigh, or groin. ¨ Redness and swelling in your leg or groin. · You have a lot of diarrhea that smells very bad. · You have trouble passing urine or stool, especially if you have mild pain or swelling in your lower belly. · You have signs of infection, such as:  ¨ Increased pain, swelling, warmth, or redness. ¨ Red streaks leading from the incision. ¨ Pus draining from the incision. ¨ A fever. · You have pain that does not get better after you take pain medicine. · You have loose stitches, or your incision comes open. · You are bleeding or have new drainage from the incision. Watch closely for any changes in your health, and be sure to contact your doctor if:  · You do not have a bowel movement after taking a laxative. · You do not get better as expected. Where can you learn more? Go to http://moises-aj.info/. Enter 136 [id number] in the search box to learn more about \"Open Bowel Resection: What to Expect at Home. \"  Current as of: August 9, 2016  Content Version: 11.3  © 7460-6237 [employer]. Care instructions adapted under license by [employer] (which disclaims liability or warranty for this information).  If you have questions about a medical condition or this instruction, always ask your healthcare professional. Braden Kaminski disclaims any warranty or liability for your use of this information.

## 2018-05-04 NOTE — PROGRESS NOTES
Kingston Coy RN Liaison at [facility] has informed this CM Renetta Holt has been received. Room Assgn - 109  RN to call report to:  [phone]    CM will inform Alex Alford NP of auth received. [address] [facility] [address] NP informed this CM discharge order will need to be received from Gen/Surg. CM called Dr. Martina Roth at [phone] - spoke with Kameron Dougherty . She will inform attending of auth received and bed available at [facility].    1452 - Discharge order acknowledged. H&P, Facesheet on chart. Nursing informed bed is available. 1458 - patient and son informed of discharge plan. Care Management Interventions  PCP Verified by CM: Yes (Dr. Luc Gale)  Mode of Transport at Discharge: Other (see comment) (medical staff)  Transition of Care Consult (CM Consult):  Other (Acute Rehab)  Discharge Durable Medical Equipment: No  Health Maintenance Reviewed: Yes  Physical Therapy Consult: Yes  Occupational Therapy Consult: Yes  Speech Therapy Consult: No  Current Support Network: Relative's Home  Confirm Follow Up Transport: Family  Plan discussed with Pt/Family/Caregiver: Yes  Freedom of Choice Offered: Yes  Discharge Location  Discharge Placement: Rehab hospital/unit acute        Jigna Powell RN CM  Ext [phone]

## 2018-05-04 NOTE — WOUND CARE
Ostomy care: POD#6 Colostomy. Patient up in chair, ambulating with walker, tolerating diet, Desir catheter still in, IV maintenance fluids at 50 ml/hr. Patient has been accepted at [facility] today and possible discharge. Patient's sone Gene Munroe and granddaughter Khari Ignacio present in room. Gene Munroe prepared new pouch to place on patient. Old pouch emptied with ostomy nurses help. Gene Munroe removed old pouch, cleansed lisandra-stomal skin with moist towel, dried skin and applied new pouch to his father with little assistance. Stoma: 75% red and moist, there is still some superficial dusky mucosal slough (25%), but stoma working well. Emptied 100 ml's of thick, loose stool. .    Patient given discharge supplies for [facility] and son given supplies to take home with him. Supply ordering reviewed with pt's son and all set for discharge.     Juana Garcia RN, [employer]

## 2018-05-04 NOTE — PROGRESS NOTES
Problem: Self Care Deficits Care Plan (Adult)  Goal: *Acute Goals and Plan of Care (Insert Text)  Occupational Therapy Goals  Initiated 5/2/2018  1. Patient will perform grooming in s tanding with supervision/set-up within 7 day(s). 2.  Patient will perform lower body dressing with minimal assistance/contact guard assist, using adaptive aids, prn within 7 day(s). 3.  Patient will perform simple home management with minimal assistance/contact guard assist, using adaptive aids, prn within 7 day(s). 4.  Patient will perform toilet transfers with supervision/set-up within 7 day(s). 5.  Patient will perform all aspects of toileting with minimal assistance/contact guard assist within 7 day(s). 6.  Patient will participate in upper extremity therapeutic exercise/activities with supervision/set-up for 10 minutes within 7 day(s). 7.  Patient will utilize energy conservation techniques during functional activities with minimal verbal cues within 7 day(s). Occupational Therapy TREATMENT  Patient: Precious Ashley (14 y.o. male)  Date: 5/4/2018  Diagnosis: Hernia, inguinal, recurrent, with obstruction  Pancreatic mass  inguinal hernia left  abnormal ct Hernia, inguinal, recurrent, with obstruction  Procedure(s) (LRB):  REDUCED AND REPAIRED LEFT INGUINAL HERNIA WITH SIGMOID RESECTION END COLOSTOMY (Left) 6 Days Post-Op  Precautions: Fall, Skin  Chart, occupational therapy assessment, plan of care, and goals were reviewed. ASSESSMENT:  Patient is making nice progress in self-care. He is very motivated and is an excellent candidate for inpatient rehab given independent prior level of function and ability to tolerate 3 hours of therapy without difficulty.   Continue per plan of care until transfer to rehab facility  Progression toward goals:  []       Improving appropriately and progressing toward goals  []       Improving slowly and progressing toward goals  []       Not making progress toward goals and plan of care will be adjusted     PLAN:  Patient continues to benefit from skilled intervention to address the above impairments. Continue treatment per established plan of care. Discharge Recommendations:  Inpatient Rehab  Further Equipment Recommendations for Discharge:  Defer to rehab facility     SUBJECTIVE:   Patient stated I think I've done pretty well to get to 89 and still be as independent as I am.    OBJECTIVE DATA SUMMARY:   Cognitive/Behavioral Status:  Neurologic State: Alert  Orientation Level: Oriented X4  Cognition: Appropriate decision making  Perception: Appears intact  Perseveration: No perseveration noted  Safety/Judgement: Awareness of environment; Insight into deficits    Functional Mobility and Transfers for ADLs:    Balance:  Sitting: Intact  Standing: Impaired  Standing - Static: Good  Standing - Dynamic : Fair    ADL Intervention:  Feeding  Feeding Assistance: Independent (finishing breakfast upon entry)    Grooming  Washing Face: Contact guard assistance  Washing Hands: Contact guard assistance  Brushing Teeth: Contact guard assistance  Brushing/Combing Hair: Contact guard assistance    Upper Body Bathing  Bathing Assistance: Stand-by assistance  Position Performed: Seated in chair         Upper [address]: Minimum  assistance (primarily due to IV, otherwise set-up)    Lower Body Dressing Assistance  Socks: Moderate assistance  Position Performed: Seated in chair  Cues: Verbal cues provided;Visual cues provided    Toileting  Toileting Assistance: Total assistance(dependent) (house and colostomy)    Cognitive Retraining  Safety/Judgement: Awareness of environment; Insight into deficits    Pain:  Pain Scale 1: Numeric (0 - 10)  Pain Intensity 1: 0              Activity Tolerance:   Good/Fair  After treatment:   [x] Patient left in no apparent distress sitting up in chair  [x] Patient left in no apparent distress in bed  [] Call bell left within reach  [x] Nursing notified  [x] Caregiver (granddaughter) present (arrived at the end of the session)  [x] Bed alarm activated    COMMUNICATION/COLLABORATION:   The patients plan of care was discussed with: Physical Therapist and Registered Nurse    Korey Aleman OTR/L  Time Calculation: 40 mins

## 2018-05-04 NOTE — DISCHARGE SUMMARY
Physician Discharge Summary     Patient ID:  Lauren Cabrera  [id number]  10 y.o.  5/8/1926    Admit Date: 4/28/2018    Discharge Date: 5/4/2018    Admission Diagnoses: Hernia, inguinal, recurrent, with obstruction; Pancreatic ma*    Discharge Diagnoses:  Principal Problem:    Hernia, inguinal, recurrent, with obstruction (4/28/2018)    Active Problems:    Pancreatic mass (4/28/2018)      ECG abnormality (4/30/2018)      HTN (hypertension) (5/3/2018)      Primary pancreatic cancer with metastasis to other site [facility]) (5/3/2018)         Admission Condition: Fair    Discharge Condition: Good    Last Procedure: Procedure(s) with comments:  REDUCED AND REPAIRED LEFT INGUINAL HERNIA WITH SIGMOID RESECTION END COLOSTOMY - [address] [facility] Course:   Pt admitted with incarcerated inguinal hernia, found to have pancreatic mass on CT suspicious for locally advanced disease. Underwent sigmoid colectomy with colostomy placement and found to have metastatic pancreatic cancer on colon. CT chest shows diffuse metastatic disease. Pt had urinary retention requiring house replacement. Now suitable for d/c to rehab inpatient unit. Consults: Gastroenterology, Hematology/Oncology and Pulmonary/Critical Care    Disposition: Inpatient rehab [facility]    Patient Instructions:   Current Discharge Medication List      START taking these medications    Details   tamsulosin (FLOMAX) 0.4 mg capsule Take 1 Cap by mouth daily. Qty: 30 Cap, Refills: 1         CONTINUE these medications which have CHANGED    Details   amLODIPine (NORVASC) 10 mg tablet Take 1 Tab by mouth daily. Qty: 30 Tab, Refills: 1      hydroCHLOROthiazide (HYDRODIURIL) 25 mg tablet Take 1 Tab by mouth daily. Indications: hypertension  Qty: 30 Tab, Refills: 1      labetalol (NORMODYNE) 200 mg tablet Take 1 Tab by mouth two (2) times a day.  Indications: hypertension  Qty: 60 Tab, Refills: 1         CONTINUE these medications which have NOT CHANGED    Details   pravastatin (PRAVACHOL) 40 mg tablet Take 40 mg by mouth nightly. Indications: HYPERCHOLESTEROLEMIA      thiamine (VITAMIN B-1) 100 mg tablet Take 100 mg by mouth daily. vitamin E (AQUA GEMS) 400 unit capsule Take 800 Units by mouth daily. Cholecalciferol, Vitamin D3, (VITAMIN D3) 1,000 unit cap Take 2 Tabs by mouth daily. STOP taking these medications       potassium chloride SR (KLOR-CON 10) 10 mEq tablet Comments:   Reason for Stopping:         clopidogrel (PLAVIX) 75 mg tablet Comments:   Reason for Stopping:         omega-3 fatty acids-vitamin e (FISH OIL) 1,000 mg cap Comments:   Reason for Stopping:         clonazePAM (KLONOPIN) 0.5 mg tablet Comments:   Reason for Stopping:             Activity: Activity as tolerated  Diet: Regular Diet  Wound Care: Keep wound clean and dry    Follow-up with Dr. Shila Edmondson in 1 week.   Follow-up outpatient with urology for voiding trial    Signed:  Richard Serrato MD  [id number] [facility]  5/4/2018  12:22 PM

## 2018-05-04 NOTE — PROGRESS NOTES
Hospitalist Progress Note    NAME: Cole Haddad   :  1926   MRN:  [id number]       Interim Hospital Summary: 80 y.o. male whom presented on 2018 with      Assessment / Plan:  Incarcerated Left Inguinal Hernia with SBO  - s/p reduced and repaired left inguinal hernia with sigmoid resection end colostomy, repair left inguinal hernia with exp lap  - abd distended with (+) bowel sounds, no flatus yet. - tolerating GI lite      Urinary retention  BPH  - house was reinserted  due to bladder re tension.  follow up as outpatient.      HTN  - continue with Norvasc, HCTZ, & Labetalol  - will consider holding HCTZ if creat continue to trending up  -  use hydralazine prn.      Complex Pancreatic Mass  Metastatic adenocarcinoma  - Chest CT: Bilateral pulmonary nodules suspicious for metastases  - Ca19-9 very high 1222; heme/onc following. Poor prognosis. Appreciate palliative team input  - appreciate GI input: lesion not resectable. No further procedure planned at this time      Leukocytosis  - WBC trending down to 9.7 from 14.6  - continue with Zosyn per primary team, general surgery  - U/A and CXR are negative.      Hyperlipidemia  -  resume statin      Hypokalemia  - replace and monitor      Body mass index is 26.28 kg/(m^2). Code status: DNR/DNI  Prophylaxis: Lovenox  Recommended Disposition: SNF  Pt is medically stable for discharge       Subjective:     Chief Complaint / Reason for Physician Visit  \"I am ok\". Discussed with RN events overnight. Review of Systems:  Symptom Y/N Comments  Symptom Y/N Comments   Fever/Chills n   Chest Pain n    Poor Appetite    Edema     Cough    Abdominal Pain     Sputum    Joint Pain     SOB/BUI n   Pruritis/Rash     Nausea/vomit n   Tolerating PT/OT     Diarrhea    Tolerating Diet     Constipation    Other       Could NOT obtain due to:      Objective:     VITALS:   Last 24hrs VS reviewed since prior progress note.  Most recent are:  Patient Vitals for the past 24 hrs:   Temp Pulse Resp BP SpO2   05/04/18 0836 - 74 - 123/59 -   05/04/18 0712 98.1 °F (36.7 °C) 68 18 108/78 -   05/04/18 0341 98.9 °F (37.2 °C) 68 20 145/69 97 %   05/03/18 2330 99.3 °F (37.4 °C) 67 20 123/57 93 %   05/03/18 1957 98.9 °F (37.2 °C) 99 16 169/63 100 %   05/03/18 1757 - 76 - 117/45 -   05/03/18 1422 98.8 °F (37.1 °C) 74 20 122/54 98 %       Intake/Output Summary (Last 24 hours) at 05/04/18 1153  Last data filed at 05/04/18 1112   Gross per 24 hour   Intake          1483.33 ml   Output             1175 ml   Net           308.33 ml        PHYSICAL EXAM:  General: Ill appearing. Alert, cooperative, no acute distress    EENT:  EOMI. Anicteric sclerae. MMM  Resp:  CTA bilaterally, no wheezing or rales. No accessory muscle use  CV:  Regular  rhythm,  No edema  GI:  Soft, distended, Non tender.  +Bowel sounds, colostomy bag in place  Neurologic:  Alert and oriented X 3, normal speech,   Psych:   Fair insight. Not anxious nor agitated  Skin:  No rashes. No jaundice    Reviewed most current lab test results and cultures  YES  Reviewed most current radiology test results   YES  Review and summation of old records today    NO  Reviewed patient's current orders and MAR    YES  PMH/ reviewed - no change compared to H&P  ________________________________________________________________________  Care Plan discussed with:    Comments   Patient y    Family      RN y    Care Manager     Consultant                        Multidiciplinary team rounds were held today with , nursing, pharmacist and clinical coordinator. Patient's plan of care was discussed; medications were reviewed and discharge planning was addressed.      ________________________________________________________________________  Total NON critical care TIME:  20  Minutes    Total CRITICAL CARE TIME Spent:   Minutes non procedure based      Comments   >50% of visit spent in counseling and coordination of care ________________________________________________________________________  Reginold ENRIQUE Núñez     Procedures: see electronic medical records for all procedures/Xrays and details which were not copied into this note but were reviewed prior to creation of Plan. LABS:  I reviewed today's most current labs and imaging studies.   Pertinent labs include:  Recent Labs      05/03/18 0426   WBC  7.8   HGB  11.3*   HCT  33.4*   PLT  234     Recent Labs      05/03/18 0426 05/02/18   0313   NA  135*  136   K  3.5  3.3*   CL  104  101   CO2  22  25   GLU  126*  136*   BUN  24*  24*   CREA  1.11  1.23   CA  8.1*  8.3*   MG  1.8  1.7       Signed: )Cortez Singh NP

## 2018-06-01 NOTE — PROGRESS NOTES
SUBJECTIVE: Chris Gottlieb is a 80 y.o. male is seen for a routine postop check s/p open incarcerated left inguinal hernia repair with colostomy, with new diagnosis of metastatic pancreatic cancer. Reports no problems with the wound or other issues. Activity, diet and bowels are normal. No pain. OBJECTIVE: Appears well. Wounds are well healed without complications or infection. Ostomy healthy and working well. ASSESSMENT: normal postoperative course, doing well. PLAN: Patient is instructed to avoid heavy lifting for 4 more weeks. Return PRN for any problems or concerns. F/U with oncology.

## 2018-06-01 NOTE — MR AVS SNAPSHOT
[address] [facility] [place] 
[phone] Patient: Bharti Ramos MRN: [id number] NVJ:7/4/1522 Visit Information Date & Time Provider Department Dept. Phone Encounter #  
 6/1/2018  9:30 AM Mack Short MD [facility] [phone] [id number] Your Appointments 6/7/2018  3:00 PM  
New Patient with Cinthya Perez MD  
[address] Oncology at [facility]) Appt Note: new seen in hospital mass removed  from colon [phone]  
[address] [place] [address] [address] [place] Upcoming Health Maintenance Date Due DTaP/Tdap/Td series (1 - Tdap) 5/8/1947 ZOSTER VACCINE AGE 60> 3/8/1986 GLAUCOMA SCREENING Q2Y 5/8/1991 Pneumococcal 65+ High/Highest Risk (1 of 2 - PCV13) 5/8/1991 MEDICARE YEARLY EXAM 4/28/2018 Influenza Age 5 to Adult 8/1/2018 Allergies as of 6/1/2018  Review Complete On: 6/1/2018 By: Mack Short MD  
  
 Severity Noted Reaction Type Reactions Aleve [Naproxen Sodium] High 02/11/2014   Systemic Swelling Throat swelling Current Immunizations  Never Reviewed No immunizations on file. Not reviewed this visit You Were Diagnosed With   
  
 Codes Comments Hernia, inguinal, recurrent, with obstruction    -  Primary ICD-10-CM: K40.31 ICD-9-CM: 550.11 Vitals Smoking Status Never Smoker Your Updated Medication List  
  
   
This list is accurate as of 6/1/18 10:09 AM.  Always use your most recent med list. amLODIPine 10 mg tablet Commonly known as:  Yelitza Matar Take 1 Tab by mouth daily. hydroCHLOROthiazide 25 mg tablet Commonly known as:  HYDRODIURIL Take 1 Tab by mouth daily. Indications: hypertension  
  
 labetalol 200 mg tablet Commonly known as:  Berny Barksdale Take 1 Tab by mouth two (2) times a day. Indications: hypertension  
  
 pravastatin 40 mg tablet Commonly known as:  PRAVACHOL Take 40 mg by mouth nightly. Indications: HYPERCHOLESTEROLEMIA  
  
 tamsulosin 0.4 mg capsule Commonly known as:  FLOMAX Take 1 Cap by mouth daily. VITAMIN B-1 100 mg tablet Generic drug:  thiamine Take 100 mg by mouth daily. VITAMIN D3 1,000 unit Cap Generic drug:  cholecalciferol Take 2 Tabs by mouth daily. vitamin E 400 unit capsule Commonly known as:  [address] Take 800 Units by mouth daily. Introducing [facility]! Jesús Dias introduces [employer] patient portal. Now you can access parts of your medical record, email your doctor's office, and request medication refills online. 1. In your internet browser, go to [email or web address]://[email or web address]. [employer] 2. Click on the First Time User? Click Here link in the Sign In box. You will see the New Member Sign Up page. 3. Enter your [employer] Access Code exactly as it appears below. You will not need to use this code after youve completed the sign-up process. If you do not sign up before the expiration date, you must request a new code. · [employer] Access Code: [email or web address] Expires: 7/27/2018  8:23 AM 
 
4. Enter the last four digits of your Social Security Number (xxxx) and Date of Birth (mm/dd/yyyy) as indicated and click Submit. You will be taken to the next sign-up page. 5. Create a Twinklrt ID. This will be your [employer]Me login ID and cannot be changed, so think of one that is secure and easy to remember. 6. Create a [employer]Me password. You can change your password at any time. 7. Enter your Password Reset Question and Answer. This can be used at a later time if you forget your password. 8. Enter your e-mail address. You will receive e-mail notification when new information is available in [address] 9. Click Sign Up. You can now view and download portions of your medical record. 10. Click the Download Summary menu link to download a portable copy of your medical information. If you have questions, please visit the Frequently Asked Questions section of the [employer] website. Remember, [employer] is NOT to be used for urgent needs. For medical emergencies, dial 911. Now available from your iPhone and Android! Please provide this summary of care documentation to your next provider. Your primary care clinician is listed as PROVIDER UNKNOWN. If you have any questions after today's visit, please call [phone].

## 2018-06-07 NOTE — PROGRESS NOTES
Lois Sainz is a 80 y.o. male here today for metastatic pancreatic cancer f/u. S/P colostomy + sigmoid resection. B/L pulmonary nodules. DDNR signed. Patient accompanied by son and granddaughter to today's appointment. Patient noted ambulating independently with walker. Patient alert and verbal. VS stable. Patient denies pain. Patient denies SOB. Patient denies falls. Non smoker.      Visit Vitals    /79 (BP 1 Location: Left arm, BP Patient Position: Sitting)    Pulse 69    Temp 97.3 °F (36.3 °C) (Oral)    Resp 18    Ht 6' (1.829 m)    Wt 185 lb 6.4 oz (84.1 kg)    SpO2 97%    BMI 25.14 kg/m2

## 2018-06-10 NOTE — PROGRESS NOTES
Oncology Follow Up Note        Patient: Michelle Alvarenga MRN: [id number]  SSN: [id number]    YOB: 1926  Age: 80 y.o. Sex: male        Diagnosis:     1. Metastatic adenocarcinoma of the pancreas   Disease in the lung, peritoneal surface     Subjective: Luis Manuel Love is a 80 y.o. who presented to the ED on 4/28/2018 with c/o diffuse abdominal pain with distention x 1 week. The pain was intermittent and had gotten worse in the last 24-48 hours. He denied any nausea or vomiting. A CT scan was completed and a mechanical obstruction of the colon and small bowel by incarcerated hernia was found. There was also a complex mass in the upper abdomen thought to be of pancreatic origin. He was taken to the OR by Dr. Reggie Fulton for repair and during this time an unsuspecting mass of the sigmoid colon was found. Preliminary biopsy shows adenocarcinoma consisting of pancreatic primary. He has a colostomy in place which is wants reversed. He is doing well. He is active, denies abdominal pain. Has a good appetite. He has not has not lost weight. He comes in to discuss his options. Review of Systems:    Constitutional: negative  Eyes: negative  Ears, Nose, Mouth, Throat, and Face: negative  Respiratory: negative  Cardiovascular: negative  Gastrointestinal: negative  Genitourinary:negative  Integument/Breast: negative  Hematologic/Lymphatic: negative  Musculoskeletal:negative  Neurological: negative    Past Medical History:   Diagnosis Date    Arthritis     ECG abnormality 4/30/2018    GERD (gastroesophageal reflux disease)     High cholesterol     Hypertension     Other ill-defined conditions(799.89) ~2000    arm tingling to ER, per pt states was not a stroke, but has been on plavix and aggrenox since.   currently on plavix    Sleep apnea with use of continuous positive airway pressure (CPAP)      Past Surgical History:   Procedure Laterality Date    HX CATARACT REMOVAL Bilateral 2008 and 2012    HX HERNIA REPAIR Left 04/28/2018    Reduced & repaired [facility] w/sigmoid resection & end colostomy    HX ORTHOPAEDIC Right     elbow     TOTAL KNEE ARTHROPLASTY Left 1999    TOTAL KNEE ARTHROPLASTY Right 2011      No family history on file. Social History   Substance Use Topics    Smoking status: Never Smoker    Smokeless tobacco: Never Used    Alcohol use No      Prior to Admission medications    Medication Sig Start Date End Date Taking? Authorizing Provider   amLODIPine (NORVASC) 10 mg tablet Take 1 Tab by mouth daily. 5/3/18  Yes Micaela Blake MD   hydroCHLOROthiazide (HYDRODIURIL) 25 mg tablet Take 1 Tab by mouth daily. Indications: hypertension 5/3/18  Yes Micaela Blake MD   labetalol (NORMODYNE) 200 mg tablet Take 1 Tab by mouth two (2) times a day. Indications: hypertension 5/3/18  Yes Micaela Blake MD   tamsulosin (FLOMAX) 0.4 mg capsule Take 1 Cap by mouth daily. 5/4/18  Yes Micaela Blake MD   pravastatin (PRAVACHOL) 40 mg tablet Take 40 mg by mouth nightly. Indications: HYPERCHOLESTEROLEMIA   Yes Historical Provider   thiamine (VITAMIN B-1) 100 mg tablet Take 100 mg by mouth daily. Yes Historical Provider   vitamin E (AQUA GEMS) 400 unit capsule Take 800 Units by mouth daily. Yes Historical Provider   Cholecalciferol, Vitamin D3, (VITAMIN D3) 1,000 unit cap Take 2 Tabs by mouth daily. Yes Historical Provider              Allergies   Allergen Reactions    Aleve [Naproxen Sodium] Swelling     Throat swelling           Objective:     Vitals:    06/07/18 1506   BP: 152/79   Pulse: 69   Resp: 18   Temp: 97.3 °F (36.3 °C)   TempSrc: Oral   SpO2: 97%   Weight: 185 lb 6.4 oz (84.1 kg)   Height: 6' (1.829 m)            Physical Exam:    GENERAL: alert, cooperative, no distress, appears stated age  EYE: negative  LYMPHATIC: Cervical, supraclavicular, and axillary nodes normal.   THROAT & NECK: normal and no erythema or exudates noted.    LUNG: clear to auscultation bilaterally  HEART: regular rate and rhythm  ABDOMEN: soft, non-tender, colostomy in place  EXTREMITIES: no cyanosis or edema  SKIN: Normal.  NEUROLOGIC: negative            Lab Results   Component Value Date/Time    WBC 9.4 05/11/2018 05:44 AM    HGB 11.5 (L) 05/11/2018 05:44 AM    HCT 32.8 (L) 05/11/2018 05:44 AM    PLATELET 950 62/75/7343 05:44 AM    .0 (H) 05/11/2018 05:44 AM       Lab Results   Component Value Date/Time    Sodium 135 (L) 05/10/2018 05:04 AM    Potassium 3.8 05/10/2018 05:04 AM    Chloride 102 05/10/2018 05:04 AM    CO2 23 05/10/2018 05:04 AM    Anion gap 10 05/10/2018 05:04 AM    Glucose 121 (H) 05/10/2018 05:04 AM    BUN 23 (H) 05/10/2018 05:04 AM    Creatinine 1.14 05/10/2018 05:04 AM    BUN/Creatinine ratio 20 05/10/2018 05:04 AM    GFR est AA >60 05/10/2018 05:04 AM    GFR est non-AA >60 05/10/2018 05:04 AM    Calcium 8.3 (L) 05/10/2018 05:04 AM    Bilirubin, total 1.1 (H) 05/05/2018 04:13 AM    AST (SGOT) 30 05/05/2018 04:13 AM    Alk. phosphatase 149 (H) 05/05/2018 04:13 AM    Protein, total 5.8 (L) 05/05/2018 04:13 AM    Albumin 2.3 (L) 05/05/2018 04:13 AM    Globulin 3.5 05/05/2018 04:13 AM    A-G Ratio 0.7 (L) 05/05/2018 04:13 AM    ALT (SGPT) 31 05/05/2018 04:13 AM           I personally reviewed the images     CT Results (most recent):    Results from [place] encounter on 04/28/18   CT CHEST W CONT   Narrative INDICATION:  Nodule on CT, lung     EXAM: Chest CT  CT dose reduction was achieved through use of a standardized protocol tailored  for this examination and automatic exposure control for dose modulation. Contrast: 100 mL Isovue 300 IV. COMPARISON: CT Abdomen Pelvis 4/28/2018. FINDINGS: There are several bilateral noncalcified irregularly marginated lower  lung nodule. Those in the bilateral posterior costophrenic angles seen on these  CT Abdomen are now partly obscured by new small pleural effusions.  A nodule in  the lower portion of the left upper lobe measures 2.6 x 2.1 cm (image 3-25). These are likely neoplastic/metastatic but could be inflammatory. There is no significant adenopathy. There is no pericardial effusion. There is  coronary artery calcification. There is aortic calcification without aneurysm. Adrenals are not enlarged. Visualized thyroid and lower neck soft tissues are  unremarkable for age. Impression IMPRESSION:   1. Bilateral pulmonary nodules suspicious for metastases. 2. New small pleural effusions. Assessment:     1. Metastatic adenocarcinoma of the pancreas    ECOG PS 1  Intent of Treatment - palliative  Prognosis: poor    Disease in the lung, peritoneal surface   Disease is unresectable    I spent 65 minute with the patient in a face-to-face encounter. I explained him the stage of the disease, pathophysiology of the disease and the treatment approaches. I answered all his questions. More than 50% of the time was utilized in education, counseling and co-ordination of care. This patient is not a suitable candidate for FOLFIRINOX chemotherapy. The standard of care for the treatment of metastatic pancreatic adenocarcinoma is a combination of Gemcitabine and nab-paclitaxel. A combination of nab-paclitaxel plus gemcitabine is superior to single agent gemcitabine. The median overall survival was 8.5 months in the nab-paclitaxel-gemcitabine group as compared with 6.7 months in the gemcitabine group (hazard ratio for death, 0.72; 95% confidence interval [CI], 0.62 to 0.83; P<0.001). The survival rate was 35% in the nab-paclitaxel-gemcitabine group versus 22% in the gemcitabine group at 1 year, and 9% versus 4% at 2 years.  The median progression-free survival was 5.5 months in the nab-paclitaxel-gemcitabine group, as compared with 3.7 months in the gemcitabine group (hazard ratio for disease progression or death, 0.69; 95% CI, 0.58 to 0.82; P<0.001); the response rate according to independent review was 23% versus 7% in the two groups (P<0.001). Kiley Stevens et al. Sherryle Hakim 2013, 369;18]    I counseled the patient regarding the chemotherapy. Discussions included side-effect, toxicity, benefit and risks of chemotherapy. He understood the expected side-effect which includes alopecia, nausea, peripheral neuropathy, neutropenic fever, anemia, need for transfusion among other things. He will think it over with his family members and then let us know if he wants to pursue palliative chemotherapy. Plan:       1.  Wait to hear from the patient whether he wants to pursue systemic chemotherapy      Signed By: German Gomez MD     Cristin 10, 2018

## 2018-06-14 NOTE — PROGRESS NOTES
Oncology Follow Up Note        Patient: Negrita Kelsey MRN: [id number]  SSN: [id number]    YOB: 1926  Age: 80 y.o. Sex: male        Diagnosis:     1. Metastatic adenocarcinoma of the pancreas   Disease in the lung, peritoneal surface     Subjective: Lucita Tomlinson is a 80 y.o. who presented to the ED on 4/28/2018 with c/o diffuse abdominal pain with distention x 1 week. The pain was intermittent and had gotten worse in the last 24-48 hours. He denied any nausea or vomiting. A CT scan was completed and a mechanical obstruction of the colon and small bowel by incarcerated hernia was found. There was also a complex mass in the upper abdomen thought to be of pancreatic origin. He was taken to the OR by Dr. Hazel Amezquita for repair and during this time an unsuspecting mass of the sigmoid colon was found. Preliminary biopsy shows adenocarcinoma consisting of pancreatic primary. He has a colostomy in place which is wants reversed. He is doing well. He is active, denies abdominal pain. Has a good appetite. He has not has not lost weight. He comes in to discuss his options. Review of Systems:    Constitutional: negative  Eyes: negative  Ears, Nose, Mouth, Throat, and Face: negative  Respiratory: negative  Cardiovascular: negative  Gastrointestinal: negative  Genitourinary:negative  Integument/Breast: negative  Hematologic/Lymphatic: negative  Musculoskeletal:negative  Neurological: negative    Past Medical History:   Diagnosis Date    Arthritis     ECG abnormality 4/30/2018    GERD (gastroesophageal reflux disease)     High cholesterol     Hypertension     Other ill-defined conditions(799.89) ~2000    arm tingling to ER, per pt states was not a stroke, but has been on plavix and aggrenox since.   currently on plavix    Sleep apnea with use of continuous positive airway pressure (CPAP)      Past Surgical History:   Procedure Laterality Date    HX CATARACT REMOVAL Bilateral 2008 and 2012    HX HERNIA REPAIR Left 04/28/2018    Reduced & repaired [facility] w/sigmoid resection & end colostomy    HX ORTHOPAEDIC Right     elbow     TOTAL KNEE ARTHROPLASTY Left 1999    TOTAL KNEE ARTHROPLASTY Right 2011      No family history on file. Social History   Substance Use Topics    Smoking status: Never Smoker    Smokeless tobacco: Never Used    Alcohol use No      Prior to Admission medications    Medication Sig Start Date End Date Taking? Authorizing Provider   amLODIPine (NORVASC) 10 mg tablet Take 1 Tab by mouth daily. 5/3/18   Fallon Hogan MD   hydroCHLOROthiazide (HYDRODIURIL) 25 mg tablet Take 1 Tab by mouth daily. Indications: hypertension 5/3/18   Fallon Hogan MD   labetalol (NORMODYNE) 200 mg tablet Take 1 Tab by mouth two (2) times a day. Indications: hypertension 5/3/18   Fallon Hogan MD   tamsulosin (FLOMAX) 0.4 mg capsule Take 1 Cap by mouth daily. 5/4/18   Fallon Hogan MD   pravastatin (PRAVACHOL) 40 mg tablet Take 40 mg by mouth nightly. Indications: HYPERCHOLESTEROLEMIA    Historical Provider   thiamine (VITAMIN B-1) 100 mg tablet Take 100 mg by mouth daily. Historical Provider   vitamin E (AQUA GEMS) 400 unit capsule Take 800 Units by mouth daily. Historical Provider   Cholecalciferol, Vitamin D3, (VITAMIN D3) 1,000 unit cap Take 2 Tabs by mouth daily. Historical Provider              Allergies   Allergen Reactions    Aleve [Naproxen Sodium] Swelling     Throat swelling           Objective:     Vitals:    06/14/18 1502   BP: 156/62   Pulse: 71   Resp: 18   Temp: 98.3 °F (36.8 °C)   SpO2: 96%   Weight: 184 lb (83.5 kg)   Height: 6' (1.829 m)            Physical Exam:    GENERAL: alert, cooperative, no distress, appears stated age  EYE: negative  LYMPHATIC: Cervical, supraclavicular, and axillary nodes normal.   THROAT & NECK: normal and no erythema or exudates noted.    LUNG: clear to auscultation bilaterally  HEART: regular rate and rhythm  ABDOMEN: soft, non-tender, colostomy in place  EXTREMITIES: no cyanosis or edema  SKIN: Normal.  NEUROLOGIC: negative            Lab Results   Component Value Date/Time    WBC 9.4 05/11/2018 05:44 AM    HGB 11.5 (L) 05/11/2018 05:44 AM    HCT 32.8 (L) 05/11/2018 05:44 AM    PLATELET 471 11/68/6897 05:44 AM    .0 (H) 05/11/2018 05:44 AM       Lab Results   Component Value Date/Time    Sodium 135 (L) 05/10/2018 05:04 AM    Potassium 3.8 05/10/2018 05:04 AM    Chloride 102 05/10/2018 05:04 AM    CO2 23 05/10/2018 05:04 AM    Anion gap 10 05/10/2018 05:04 AM    Glucose 121 (H) 05/10/2018 05:04 AM    BUN 23 (H) 05/10/2018 05:04 AM    Creatinine 1.14 05/10/2018 05:04 AM    BUN/Creatinine ratio 20 05/10/2018 05:04 AM    GFR est AA >60 05/10/2018 05:04 AM    GFR est non-AA >60 05/10/2018 05:04 AM    Calcium 8.3 (L) 05/10/2018 05:04 AM    Bilirubin, total 1.1 (H) 05/05/2018 04:13 AM    AST (SGOT) 30 05/05/2018 04:13 AM    Alk. phosphatase 149 (H) 05/05/2018 04:13 AM    Protein, total 5.8 (L) 05/05/2018 04:13 AM    Albumin 2.3 (L) 05/05/2018 04:13 AM    Globulin 3.5 05/05/2018 04:13 AM    A-G Ratio 0.7 (L) 05/05/2018 04:13 AM    ALT (SGPT) 31 05/05/2018 04:13 AM           I personally reviewed the images     CT Results (most recent):    Results from [place] encounter on 04/28/18   CT CHEST W CONT   Narrative INDICATION:  Nodule on CT, lung     EXAM: Chest CT  CT dose reduction was achieved through use of a standardized protocol tailored  for this examination and automatic exposure control for dose modulation. Contrast: 100 mL Isovue 300 IV. COMPARISON: CT Abdomen Pelvis 4/28/2018. FINDINGS: There are several bilateral noncalcified irregularly marginated lower  lung nodule. Those in the bilateral posterior costophrenic angles seen on these  CT Abdomen are now partly obscured by new small pleural effusions. A nodule in  the lower portion of the left upper lobe measures 2.6 x 2.1 cm (image 3-25).   These are likely neoplastic/metastatic but could be inflammatory. There is no significant adenopathy. There is no pericardial effusion. There is  coronary artery calcification. There is aortic calcification without aneurysm. Adrenals are not enlarged. Visualized thyroid and lower neck soft tissues are  unremarkable for age. Impression IMPRESSION:   1. Bilateral pulmonary nodules suspicious for metastases. 2. New small pleural effusions. Assessment:     1. Metastatic adenocarcinoma of the pancreas    ECOG PS 1  Intent of Treatment - palliative  Prognosis: poor    Disease in the lung, peritoneal surface   Disease is unresectable    This patient is not a suitable candidate for FOLFIRINOX chemotherapy. The standard of care for the treatment of metastatic pancreatic adenocarcinoma is a combination of Gemcitabine and nab-paclitaxel. I will screen him for the CAN-STEM study. A combination of nab-paclitaxel plus gemcitabine is superior to single agent gemcitabine. The median overall survival was 8.5 months in the nab-paclitaxel-gemcitabine group as compared with 6.7 months in the gemcitabine group (hazard ratio for death, 0.72; 95% confidence interval [CI], 0.62 to 0.83; P<0.001). The survival rate was 35% in the nab-paclitaxel-gemcitabine group versus 22% in the gemcitabine group at 1 year, and 9% versus 4% at 2 years. The median progression-free survival was 5.5 months in the nab-paclitaxel-gemcitabine group, as compared with 3.7 months in the gemcitabine group (hazard ratio for disease progression or death, 0.69; 95% CI, 0.58 to 0.82; P<0.001); the response rate according to independent review was 23% versus 7% in the two groups (P<0.001). Adelina Abel et al. [facility] 2013, 369;18]    I counseled the patient regarding the chemotherapy. Discussions included side-effect, toxicity, benefit and risks of chemotherapy.   He understood the expected side-effect which includes alopecia, nausea, peripheral neuropathy, neutropenic fever, anemia, need for transfusion among other things. Plan:       1. Port-a-cath placement  2. Screen for CAN-STEM study  3.  Start systemic chemotherapy        Signed By: Rachel Allison MD     June 14, 2018

## 2018-06-14 NOTE — PROGRESS NOTES
Pt is a 80 yr old Pt here as a follow up for metastatic pancreatic cancer to lung. Pt and family has decided to try chemotherapy. He reports feeling well, eating well, no N/V/D. Blood pressure 156/62, pulse 71, temperature 98.3 °F (36.8 °C), resp. rate 18, height 6' (1.829 m), weight 184 lb (83.5 kg), SpO2 96 %.

## 2018-06-20 NOTE — PROGRESS NOTES
[facility]  Social Work Navigator Encounter     Patient Name:  Lela Ashley    Medical History:     Advance Directives:    Narrative: SW has spoken with son Angélica  a couple times. He wanted to know CPT codes for port, codes for administration of chemo. Etc  SW spoke with NP - who put thru the order to determine cost of treatment - knowing we may cancel. Inderjit Feng spoke with Johnos  and Johnos  gave the co ahead to proceed with treatment. NP placed port placement with IR.      SW called and left son msg about calling [phone] to schedule port. Per NP - pt on schedule for treatment on 6/27.       Barriers to Care:     Plan:

## 2018-06-21 NOTE — TELEPHONE ENCOUNTER
Mr Aldana's son, Declan Hair, spoke with company that sent supplies. He states a doctor needs to send order to refill ostomy supplies. Pt may need to call PCP, will check with Dr Crystal Appiah & call back Friday.

## 2018-06-21 NOTE — TELEPHONE ENCOUNTER
Patient's son called stating he was told that Dr Jackie Crespo needs to send an order for his father's colostomy bags.

## 2018-06-22 NOTE — ROUTINE PROCESS
1105  Patient arrived ambulatory to Radiology Recovery, alert and oriented x 4, accompanied by son. 200  Dr. Selena Chen at bedside explaining procedure to patient and providing patient with risks and benefits of procedure. Patient verbalized understanding and signed consent for procedure. 1400  Patient alert and oriented x 4, drinking Pepsi and eating crackers without difficulty, denies complaints, dressing to right chest dry and intact. Patient and son provided with verbal and written discharge instructions. Patient and son verbalized understanding. Patient discharged via wheelchair with family to transport home.

## 2018-06-22 NOTE — H&P
Radiology History and Physical    Patient: Prasanna Diop 80 y.o. male       Chief Complaint: No chief complaint on file. History of Present Illness: for port    History:    Past Medical History:   Diagnosis Date    Arthritis     ECG abnormality 4/30/2018    GERD (gastroesophageal reflux disease)     High cholesterol     Hypertension     Other ill-defined conditions(799.89) ~2000    arm tingling to ER, per pt states was not a stroke, but has been on plavix and aggrenox since. currently on plavix    Sleep apnea with use of continuous positive airway pressure (CPAP)      No family history on file. Social History     Social History    Marital status:      Spouse name: N/A    Number of children: N/A    Years of education: N/A     Occupational History    Not on file. Social History Main Topics    Smoking status: Never Smoker    Smokeless tobacco: Never Used    Alcohol use No    Drug use: No    Sexual activity: Not on file     Other Topics Concern    Not on file     Social History Narrative       Allergies: Allergies   Allergen Reactions    Aleve [Naproxen Sodium] Swelling     Throat swelling       Current Medications:  Current Outpatient Prescriptions   Medication Sig    amLODIPine (NORVASC) 10 mg tablet Take 1 Tab by mouth daily.  hydroCHLOROthiazide (HYDRODIURIL) 25 mg tablet Take 1 Tab by mouth daily. Indications: hypertension    labetalol (NORMODYNE) 200 mg tablet Take 1 Tab by mouth two (2) times a day. Indications: hypertension    tamsulosin (FLOMAX) 0.4 mg capsule Take 1 Cap by mouth daily.  pravastatin (PRAVACHOL) 40 mg tablet Take 40 mg by mouth nightly. Indications: HYPERCHOLESTEROLEMIA    thiamine (VITAMIN B-1) 100 mg tablet Take 100 mg by mouth daily.  vitamin E (AQUA GEMS) 400 unit capsule Take 800 Units by mouth daily.  Cholecalciferol, Vitamin D3, (VITAMIN D3) 1,000 unit cap Take 2 Tabs by mouth daily.      Current Facility-Administered Medications Medication Dose Route Frequency    lidocaine (XYLOCAINE) 20 mg/mL (2 %) injection 360 mg  18 mL SubCUTAneous ONCE    heparin (porcine) pf 300 Units  300 Units InterCATHeter ONCE    lidocaine-EPINEPHrine (XYLOCAINE) 1 %-1:100,000 injection 15 mg  1.5 mL IntraDERMal ONCE        Physical Exam:  There were no vitals taken for this visit. LUNG: clear to auscultation bilaterally, HEART: regular rate and rhythm      Alerts:    Hospital Problems  Date Reviewed: 6/10/2018    None          Laboratory:    No results for input(s): HGB, HCT, WBC, PLT, INR, BUN, CREA, K, CRCLT, HGBEXT, HCTEXT, PLTEXT in the last 72 hours. No lab exists for component: PTT, PT, INREXT      Plan of Care/Planned Procedure:  Risks, benefits, and alternatives reviewed with patient and he agrees to proceed with the procedure.        Nayana Julian MD

## 2018-06-22 NOTE — DISCHARGE INSTRUCTIONS
[address] [facility]  Angiography Department      Radiologist:  Dr. Gunjan Saucedo       Date:  June 22, 2018       [place] Discharge Instructions      Watch for signs of infection:    1. Redness,   2. Fever, chills,   3. Increased pain, and/or drainage from the site. If this occurs, call your physician at once. Return next week for a [employer] check:       Do not register. Proceed to the Radiology Waiting Area and let the  know you are here for a \"PORT site check\". If you have an appointment with a provider or at the infusion center in the upcoming week,  they may check your site and change the dressing for you. Keep your dressing clean and dry. Leave the dressing in place until seen here next week. Continue your previous diet and restart your regularly prescribed medications. You may take Tylenol, as directed on the label, for pain if needed. Avoid ibuprofen (Advil, Motrin) and aspirin as they may cause you to bleed. Because you received sedation, you are not to drive or sign any legal documents for the next 24 hours. Do not lift anything heavier than 5 pounds with the affected arm and avoid pushing and pulling movements for several days. If you have any questions or concerns, please call our radiology department at [phone].

## 2018-06-22 NOTE — IP AVS SNAPSHOT
Summary of Care Report The Summary of Care report has been created to help improve care coordination. Users with access to [employer] or [address] (Web-based application) may access additional patient information including the Discharge Summary. If you are not currently a [address] user and need more information, please call the number listed below in the [place] [address] section and ask to be connected with Medical Records. Facility Information Name Address Phone [address] [address] [phone] Patient Information Patient Name Sex  David Reyes ([id number]) Male 1926 Discharge Information Admitting Provider Service Area Unit  
 (none) [facility] / [phone] Discharge Provider Discharge Date/Time Discharge Disposition Destination (none) (none) (none) (none) Patient Language Language ENGLISH [13] Hospital Problems as of 2018  Reviewed: 6/10/2018  2:26 PM by Alvarez Bowman MD  
 None Non-Hospital Problems as of 2018  Reviewed: 6/10/2018  2:26 PM by Alvarez Bowman MD  
  
  
  
 Class Noted - Resolved Last Modified Active Problems   Pancreatic mass  2018 - Present 5/3/2018 by Meghna Ceja MD  
  Entered by Cee Augustine MD  
  Hernia, inguinal, recurrent, with obstruction  2018 - Present 5/3/2018 by Meghna Ceja MD  
  Entered by Cee Augustine MD  
  ECG abnormality  2018 - Present 5/3/2018 by Meghna Ceja MD  
  Entered by Darek Jordan NP  
  HTN (hypertension)  5/3/2018 - Present 5/3/2018 by Meghna Ceja MD  
  Entered by Meghna Ceja MD  
  Primary pancreatic cancer with metastasis to other site [facility])  5/3/2018 - Present 5/3/2018 by Alvarez Bowman MD  
  Entered by Alvarez Bowman MD  
  
 You are allergic to the following Allergen Reactions Aleve (Naproxen Sodium) Swelling Throat swelling Current Discharge Medication List  
  
ASK your doctor about these medications Dose & Instructions Dispensing Information Comments  
 amLODIPine 10 mg tablet Commonly known as:  Radha Shield Dose:  10 mg Take 1 Tab by mouth daily. Quantity:  30 Tab Refills:  1  
   
 hydroCHLOROthiazide 25 mg tablet Commonly known as:  HYDRODIURIL Dose:  25 mg Take 1 Tab by mouth daily. Indications: hypertension Quantity:  30 Tab Refills:  1  
   
 labetalol 200 mg tablet Commonly known as:  Floria Salt Dose:  200 mg Take 1 Tab by mouth two (2) times a day. Indications: hypertension Quantity:  60 Tab Refills:  1  
   
 pravastatin 40 mg tablet Commonly known as:  PRAVACHOL Dose:  40 mg Take 40 mg by mouth nightly. Indications: HYPERCHOLESTEROLEMIA Refills:  0  
   
 VITAMIN B-1 100 mg tablet Generic drug:  thiamine Dose:  100 mg Take 100 mg by mouth daily. Refills:  0  
   
 VITAMIN D3 1,000 unit Cap Generic drug:  cholecalciferol Dose:  2 Tab Take 2 Tabs by mouth daily. Refills:  0  
   
 vitamin E 400 unit capsule Commonly known as:  [address] Dose:  800 Units Take 800 Units by mouth daily. Refills:  0 Follow-up Information None Discharge Instructions [facility] Angiography Department Radiologist:  Dr. Azalia Weston Date:  June 22, 2018 [facility] Discharge Instructions Watch for signs of infection: 1. Redness,  
2. Fever, chills,  
3. Increased pain, and/or drainage from the site. If this occurs, call your physician at once. Return next week for a [employer] check: Do not register. Proceed to the Radiology Waiting Area and let the  know you are here for a \"PORT site check\". If you have an appointment with a provider or at the infusion center in the upcoming week,  they may check your site and change the dressing for you. Keep your dressing clean and dry. Leave the dressing in place until seen here next week. Continue your previous diet and restart your regularly prescribed medications. You may take Tylenol, as directed on the label, for pain if needed. Avoid ibuprofen (Advil, Motrin) and aspirin as they may cause you to bleed. Because you received sedation, you are not to drive or sign any legal documents for the next 24 hours. Do not lift anything heavier than 5 pounds with the affected arm and avoid pushing and pulling movements for several days. If you have any questions or concerns, please call our radiology department at [phone]. Chart Review Routing History Recipient Method Report Sent By MD Fadi Farah 2/4/2012  1:31 AM 02/04/2012 Provider Unknown, MD  
Patient not available to ask [address] Mail IP Auto Routed Notes Barbie Travis  [phone] 6/22/2018 11:27 AM 06/22/2018

## 2018-06-22 NOTE — TELEPHONE ENCOUNTER
Spoke with pt's son, Katherine Kruse. Since pt was discharged from [address] [facility] to [facility], supplies should be set up for Mr Louann West or pt may need to call supply company & have supply order faxed to our office.

## 2018-06-27 NOTE — PROGRESS NOTES
Pt arrived to [facility] ambulatory in no acute distress at 1050 for Abraxane/Gemzar C1D1.  Assessment unremarkable except Pt forgot to wear hearing aids. R chest port accessed without issue and positive blood return noted.  Labs obtained, CBCap, BMP, LFT. Visit Vitals    /74 (BP 1 Location: Left arm, BP Patient Position: Sitting)    Pulse 63    Temp 97.8 °F (36.6 °C)    Resp 18    Ht 6' (1.829 m)    Wt 83 kg (182 lb 14.4 oz)    SpO2 97%    BMI 24.81 kg/m2     Recent Results (from the past 12 hour(s))   CBC WITH 3 PART DIFF    Collection Time: 06/27/18 11:04 AM   Result Value Ref Range    WBC 6.7 4.1 - 11.1 K/uL    RBC 3.51 (L) 4.10 - 5.70 M/uL    HGB 12.3 12.1 - 17.0 g/dL    HCT 35.8 (L) 36.6 - 50.3 %    .0 (H) 80.0 - 99.0 FL    MCH 35.0 (H) 26.0 - 34.0 PG    MCHC 34.4 30.0 - 36.5 g/dL    RDW 13.1 11.8 - 15.8 %    PLATELET 683 019 - 963 K/uL    NEUTROPHILS 74 32 - 75 %    MIXED CELLS 12 3.2 - 16.9 %    LYMPHOCYTES 15 12 - 49 %    ABS. NEUTROPHILS 4.9 1.8 - 8.0 K/UL    ABS. MIXED CELLS 0.8 0.2 - 1.2 K/uL    ABS. LYMPHOCYTES 1.0 0.8 - 3.5 K/UL    DF AUTOMATED     METABOLIC PANEL, BASIC    Collection Time: 06/27/18 11:04 AM   Result Value Ref Range    Sodium 138 136 - 145 mmol/L    Potassium 3.1 (L) 3.5 - 5.1 mmol/L    Chloride 102 97 - 108 mmol/L    CO2 27 21 - 32 mmol/L    Anion gap 9 5 - 15 mmol/L    Glucose 154 (H) 65 - 100 mg/dL    BUN 19 6 - 20 MG/DL    Creatinine 1.11 0.70 - 1.30 MG/DL    BUN/Creatinine ratio 17 12 - 20      GFR est AA >60 >60 ml/min/1.73m2    GFR est non-AA >60 >60 ml/min/1.73m2    Calcium 8.9 8.5 - 10.1 MG/DL   HEPATIC FUNCTION PANEL    Collection Time: 06/27/18 11:04 AM   Result Value Ref Range    Protein, total 6.3 (L) 6.4 - 8.2 g/dL    Albumin 3.1 (L) 3.5 - 5.0 g/dL    Globulin 3.2 2.0 - 4.0 g/dL    A-G Ratio 1.0 (L) 1.1 - 2.2      Bilirubin, total 0.8 0.2 - 1.0 MG/DL    Bilirubin, direct 0.2 0.0 - 0.2 MG/DL    Alk.  phosphatase 87 45 - 117 U/L    AST (SGOT) 13 (L) 15 - 37 U/L    ALT (SGPT) 14 12 - 78 U/L     The following medications administered:  Potassium 40meq PO  [email or web address]  Decadron 8mg IVP  Abraxane 255mg IV over 30 minutes  Gemzar 2060mg IV over 30 minutes    Pt tolerated treatment well. Pt educated about chemo, handouts given. Discharge instructions reviewed. Port flushed per policy and de-accessed, 2x2 and tape placed.  Pt discharged ambulatory in no acute distress at 1445, accompanied by son. Next appointment 7/5/18 at 1100.

## 2018-06-27 NOTE — PROGRESS NOTES
Oncology Follow Up Note        Patient: Maritza Felder MRN: [id number]  SSN: [id number]    YOB: 1926  Age: 80 y.o. Sex: male        Diagnosis:     1. Metastatic adenocarcinoma of the pancreas   Disease in the lung, peritoneal surface     Treatment:     1. Palliative chemotherapy   Gemcitabine and Abraxane, Cycle 1 Day 1    Subjective: Jasmeet Snyder is a 80 y.o. with a recent diagnosis of metastatic pancreatic adenocarcinoma. He presented to the ED on 4/28/2018 with c/o diffuse abdominal pain with distention x 1 week. The pain was intermittent and had gotten worse in the last 24-48 hours. He denied any nausea or vomiting. A CT scan was completed and a mechanical obstruction of the colon and small bowel by incarcerated hernia was found. There was also a complex mass in the upper abdomen thought to be of pancreatic origin. He was taken to the OR by Dr. Raul Arambula for repair and during this time an unsuspecting mass of the sigmoid colon was found. Preliminary biopsy shows adenocarcinoma consisting of pancreatic primary. He has a colostomy in place which is wants reversed. He is doing well. He is active, denies abdominal pain. Has a good appetite. He has not has not lost weight. He comes in to start systemic therapy. Review of Systems:    Constitutional: negative  Eyes: negative  Ears, Nose, Mouth, Throat, and Face: negative  Respiratory: negative  Cardiovascular: negative  Gastrointestinal: negative  Genitourinary:negative  Integument/Breast: negative  Hematologic/Lymphatic: negative  Musculoskeletal:negative  Neurological: negative    Past Medical History:   Diagnosis Date    Arthritis     ECG abnormality 4/30/2018    GERD (gastroesophageal reflux disease)     High cholesterol     Hypertension     Other ill-defined conditions(799.89) ~2000    arm tingling to ER, per pt states was not a stroke, but has been on plavix and aggrenox since.   currently on plavix    Sleep apnea with use of continuous positive airway pressure (CPAP)      Past Surgical History:   Procedure Laterality Date    HX CATARACT REMOVAL Bilateral 2008 and 2012    HX HERNIA REPAIR Left 04/28/2018    Reduced & repaired [facility] w/sigmoid resection & end colostomy    HX ORTHOPAEDIC Right     elbow     TOTAL KNEE ARTHROPLASTY Left 1999    TOTAL KNEE ARTHROPLASTY Right 2011      No family history on file. Social History   Substance Use Topics    Smoking status: Never Smoker    Smokeless tobacco: Never Used    Alcohol use No      Prior to Admission medications    Medication Sig Start Date End Date Taking? Authorizing Provider   ondansetron (ZOFRAN ODT) 4 mg disintegrating tablet Take 1 Tab by mouth every eight (8) hours as needed for Nausea. 6/27/18  Yes Rupinder Garcia NP   amLODIPine (NORVASC) 10 mg tablet Take 1 Tab by mouth daily. 5/3/18  Yes Shandra Avina MD   hydroCHLOROthiazide (HYDRODIURIL) 25 mg tablet Take 1 Tab by mouth daily. Indications: hypertension 5/3/18  Yes Shandra Avina MD   labetalol (NORMODYNE) 200 mg tablet Take 1 Tab by mouth two (2) times a day. Indications: hypertension 5/3/18  Yes Shandra Avina MD   pravastatin (PRAVACHOL) 40 mg tablet Take 40 mg by mouth nightly. Indications: HYPERCHOLESTEROLEMIA   Yes Historical Provider   thiamine (VITAMIN B-1) 100 mg tablet Take 100 mg by mouth daily. Yes Historical Provider   vitamin E (AQUA GEMS) 400 unit capsule Take 800 Units by mouth daily. Yes Historical Provider   Cholecalciferol, Vitamin D3, (VITAMIN D3) 1,000 unit cap Take 2 Tabs by mouth daily. Yes Historical Provider   prochlorperazine (COMPAZINE) 10 mg tablet Take 1 Tab by mouth every six (6) hours as needed for up to 30 days. 6/27/18 7/27/18  Rupinder Garcia NP   lidocaine-prilocaine (EMLA) topical cream Apply  to affected area as needed for Pain.  6/27/18   Rupinder Garcia NP        Allergies   Allergen Reactions    Aleve [Naproxen Sodium] Swelling     Throat swelling Objective:     Vitals:    06/27/18 1123   Weight: 182 lb (82.6 kg)   Height: 6' (1.829 m)        Physical Exam:    GENERAL: alert, cooperative, no distress, appears stated age  EYE: negative  LYMPHATIC: Cervical, supraclavicular, and axillary nodes normal.   THROAT & NECK: normal and no erythema or exudates noted. LUNG: clear to auscultation bilaterally  HEART: regular rate and rhythm  ABDOMEN: soft, non-tender, colostomy in place  EXTREMITIES: no cyanosis or edema  SKIN: Normal.  NEUROLOGIC: negative      Lab Results   Component Value Date/Time    WBC 6.7 06/27/2018 11:04 AM    HGB 12.3 06/27/2018 11:04 AM    HCT 35.8 (L) 06/27/2018 11:04 AM    PLATELET 276 78/89/5181 11:04 AM    .0 (H) 06/27/2018 11:04 AM       Lab Results   Component Value Date/Time    Sodium 135 (L) 05/10/2018 05:04 AM    Potassium 3.8 05/10/2018 05:04 AM    Chloride 102 05/10/2018 05:04 AM    CO2 23 05/10/2018 05:04 AM    Anion gap 10 05/10/2018 05:04 AM    Glucose 121 (H) 05/10/2018 05:04 AM    BUN 23 (H) 05/10/2018 05:04 AM    Creatinine 1.14 05/10/2018 05:04 AM    BUN/Creatinine ratio 20 05/10/2018 05:04 AM    GFR est AA >60 05/10/2018 05:04 AM    GFR est non-AA >60 05/10/2018 05:04 AM    Calcium 8.3 (L) 05/10/2018 05:04 AM    Bilirubin, total 1.1 (H) 05/05/2018 04:13 AM    AST (SGOT) 30 05/05/2018 04:13 AM    Alk. phosphatase 149 (H) 05/05/2018 04:13 AM    Protein, total 5.8 (L) 05/05/2018 04:13 AM    Albumin 2.3 (L) 05/05/2018 04:13 AM    Globulin 3.5 05/05/2018 04:13 AM    A-G Ratio 0.7 (L) 05/05/2018 04:13 AM    ALT (SGPT) 31 05/05/2018 04:13 AM         I personally reviewed the images     CT Results (most recent):    Results from [place] encounter on 04/28/18   CT CHEST W CONT   Narrative INDICATION:  Nodule on CT, lung     EXAM: Chest CT  CT dose reduction was achieved through use of a standardized protocol tailored  for this examination and automatic exposure control for dose modulation.     Contrast: 100 mL Isovue 300 IV. COMPARISON: CT Abdomen Pelvis 4/28/2018. FINDINGS: There are several bilateral noncalcified irregularly marginated lower  lung nodule. Those in the bilateral posterior costophrenic angles seen on these  CT Abdomen are now partly obscured by new small pleural effusions. A nodule in  the lower portion of the left upper lobe measures 2.6 x 2.1 cm (image 3-25). These are likely neoplastic/metastatic but could be inflammatory. There is no significant adenopathy. There is no pericardial effusion. There is  coronary artery calcification. There is aortic calcification without aneurysm. Adrenals are not enlarged. Visualized thyroid and lower neck soft tissues are  unremarkable for age. Impression IMPRESSION:   1. Bilateral pulmonary nodules suspicious for metastases. 2. New small pleural effusions. Assessment:     1. Metastatic adenocarcinoma of the pancreas    ECOG PS 1  Intent of Treatment - palliative  Prognosis: poor    Disease in the lung, peritoneal surface   Disease is unresectable    Receiving palliative chemotherapy   Gemcitabine and Abraxane, Cycle 1 Day 1    I explained to the patient the usual side effects of the treatment and ways to manage it. Blood counts are acceptable. Results reviewed with the patient. Symptom management form reviewed and scanned into the EMR under Media. Plan:       > Proceed with C#1 Gemcitabine (1000 mg/m2) and Abraxane (125 mg/m2) given on days 1,8,15 every 28 days. > Labs: CBC, BMP prior to each treatment. Hepatic function panel every 4 weeks.  CA 19.9 prior to day 1  > Antiemetic prophylaxis: Dexamethasone prior to each treatment  > PRN antiemetics: Ondansetron and Prochlorperazine at home  > EMLA cream for port  > Consent scanned into EMR  > Return to clinic 1 week      Signed by: Miya Enriquez MD                     June 30, 2018

## 2018-06-27 NOTE — PROGRESS NOTES
Chief Complaint   Patient presents with    Follow-up     starting treatment today         1. Have you been to the ER, urgent care clinic since your last visit? Hospitalized since your last visit? no    2. Have you seen or consulted any other health care providers outside of the [facility] since your last visit? Include any pap smears or colon screening.   no

## 2018-06-27 NOTE — DISCHARGE INSTRUCTIONS

## 2018-07-05 NOTE — PROGRESS NOTES
Oncology Follow Up Note        Patient: Evita Oseguera MRN: [id number]  SSN: [id number]    YOB: 1926  Age: 80 y.o. Sex: male        Diagnosis:     1. Metastatic adenocarcinoma of the pancreas   Disease in the lung, peritoneal surface     Treatment:     1. Palliative chemotherapy   Gemcitabine and Abraxane, s/p Cycle 1 Day 1    Subjective: Devante Bundy is a 80 y.o. with a recent diagnosis of metastatic pancreatic adenocarcinoma. He presented to the ED on 4/28/2018 with c/o diffuse abdominal pain with distention x 1 week. The pain was intermittent and had gotten worse in the last 24-48 hours. He denied any nausea or vomiting. A CT scan was completed and a mechanical obstruction of the colon and small bowel by incarcerated hernia was found. There was also a complex mass in the upper abdomen thought to be of pancreatic origin. He was taken to the OR by Dr. Butch Hurley for repair and during this time an unsuspecting mass of the sigmoid colon was found. Preliminary biopsy shows adenocarcinoma consisting of pancreatic primary. He has a colostomy in place which is wants reversed. He is doing well. He is active, denies abdominal pain. Has a good appetite. He has not had not lost weight. Mr. Beatrice Blanca received cycle 1 last week and did well. However, [place] in [place] sun. He said when he got home he went to [employer] to eat and was up all night with indigestion. He vomited this morning and felt better after but still fills under the weather. Nausea has improved. He feels weak.        Review of Systems:    Constitutional: fatigue  Eyes: negative  Ears, Nose, Mouth, Throat, and Face: negative  Respiratory: negative  Cardiovascular: negative  Gastrointestinal: indigestion  Genitourinary:negative  Integument/Breast: negative  Hematologic/Lymphatic: negative  Musculoskeletal:negative  Neurological: negative          Past Medical History: Diagnosis Date    Arthritis     ECG abnormality 4/30/2018    GERD (gastroesophageal reflux disease)     High cholesterol     Hypertension     Other ill-defined conditions(799.89) ~2000    arm tingling to ER, per pt states was not a stroke, but has been on plavix and aggrenox since. currently on plavix    Sleep apnea with use of continuous positive airway pressure (CPAP)      Past Surgical History:   Procedure Laterality Date    HX CATARACT REMOVAL Bilateral 2008 and 2012    HX HERNIA REPAIR Left 04/28/2018    Reduced & repaired [facility] w/sigmoid resection & end colostomy    HX ORTHOPAEDIC Right     elbow     TOTAL KNEE ARTHROPLASTY Left 1999    TOTAL KNEE ARTHROPLASTY Right 2011      No family history on file. Social History   Substance Use Topics    Smoking status: Never Smoker    Smokeless tobacco: Never Used    Alcohol use No      Prior to Admission medications    Medication Sig Start Date End Date Taking? Authorizing Provider   ondansetron (ZOFRAN ODT) 4 mg disintegrating tablet Take 1 Tab by mouth every eight (8) hours as needed for Nausea. 6/27/18  Yes Galo Brothers NP   prochlorperazine (COMPAZINE) 10 mg tablet Take 1 Tab by mouth every six (6) hours as needed for up to 30 days. 6/27/18 7/27/18 Yes Galo Brothers NP   lidocaine-prilocaine (EMLA) topical cream Apply  to affected area as needed for Pain. 6/27/18  Yes Galo Brothers NP   amLODIPine (NORVASC) 10 mg tablet Take 1 Tab by mouth daily. 5/3/18  Yes Sophie Campbell MD   hydroCHLOROthiazide (HYDRODIURIL) 25 mg tablet Take 1 Tab by mouth daily. Indications: hypertension 5/3/18  Yes Sophie Campbell MD   labetalol (NORMODYNE) 200 mg tablet Take 1 Tab by mouth two (2) times a day. Indications: hypertension 5/3/18  Yes Sophie Campbell MD   pravastatin (PRAVACHOL) 40 mg tablet Take 40 mg by mouth nightly. Indications: HYPERCHOLESTEROLEMIA   Yes Historical Provider   thiamine (VITAMIN B-1) 100 mg tablet Take 100 mg by mouth daily.    Yes Historical Provider   vitamin E (AQUA GEMS) 400 unit capsule Take 800 Units by mouth daily. Yes Historical Provider   Cholecalciferol, Vitamin D3, (VITAMIN D3) 1,000 unit cap Take 2 Tabs by mouth daily. Yes Historical Provider        Allergies   Allergen Reactions    Aleve [Naproxen Sodium] Swelling     Throat swelling         Objective:     Vitals:    07/05/18 1310   BP: 115/73   Pulse: 74   Resp: 18   Temp: 97.7 °F (36.5 °C)   TempSrc: Oral   SpO2: 93%   Weight: 185 lb 7 oz (84.1 kg)   Height: 6' (1.829 m)        Physical Exam:    GENERAL: alert, cooperative, no distress, appears stated age  EYE: negative  LYMPHATIC: Cervical, supraclavicular, and axillary nodes normal.   THROAT & NECK: normal and no erythema or exudates noted. LUNG: clear to auscultation bilaterally  HEART: regular rate and rhythm  ABDOMEN: soft, non-tender, colostomy in place  EXTREMITIES: no cyanosis or edema  SKIN: Normal.  NEUROLOGIC: negative      Lab Results   Component Value Date/Time    WBC 4.8 07/05/2018 11:31 AM    HGB 12.3 07/05/2018 11:31 AM    HCT 34.9 (L) 07/05/2018 11:31 AM    PLATELET 255 25/66/1814 11:31 AM    MCV 98.9 07/05/2018 11:31 AM       Lab Results   Component Value Date/Time    Sodium 134 (L) 07/05/2018 11:31 AM    Potassium 3.2 (L) 07/05/2018 11:31 AM    Chloride 96 (L) 07/05/2018 11:31 AM    CO2 28 07/05/2018 11:31 AM    Anion gap 10 07/05/2018 11:31 AM    Glucose 165 (H) 07/05/2018 11:31 AM    BUN 28 (H) 07/05/2018 11:31 AM    Creatinine 1.41 (H) 07/05/2018 11:31 AM    BUN/Creatinine ratio 20 07/05/2018 11:31 AM    GFR est AA 57 (L) 07/05/2018 11:31 AM    GFR est non-AA 47 (L) 07/05/2018 11:31 AM    Calcium 9.4 07/05/2018 11:31 AM    Bilirubin, total 0.8 06/27/2018 11:04 AM    AST (SGOT) 13 (L) 06/27/2018 11:04 AM    Alk.  phosphatase 87 06/27/2018 11:04 AM    Protein, total 6.3 (L) 06/27/2018 11:04 AM    Albumin 3.1 (L) 06/27/2018 11:04 AM    Globulin 3.2 06/27/2018 11:04 AM    A-G Ratio 1.0 (L) 06/27/2018 11:04 AM    ALT (SGPT) 14 06/27/2018 11:04 AM         I personally reviewed the images     CT Results (most recent):    Results from [place] encounter on 04/28/18   CT CHEST W CONT   Narrative INDICATION:  Nodule on CT, lung     EXAM: Chest CT  CT dose reduction was achieved through use of a standardized protocol tailored  for this examination and automatic exposure control for dose modulation. Contrast: 100 mL Isovue 300 IV. COMPARISON: CT Abdomen Pelvis 4/28/2018. FINDINGS: There are several bilateral noncalcified irregularly marginated lower  lung nodule. Those in the bilateral posterior costophrenic angles seen on these  CT Abdomen are now partly obscured by new small pleural effusions. A nodule in  the lower portion of the left upper lobe measures 2.6 x 2.1 cm (image [id number]). These are likely neoplastic/metastatic but could be inflammatory. There is no significant adenopathy. There is no pericardial effusion. There is  coronary artery calcification. There is aortic calcification without aneurysm. Adrenals are not enlarged. Visualized thyroid and lower neck soft tissues are  unremarkable for age. Impression IMPRESSION:   1. Bilateral pulmonary nodules suspicious for metastases. 2. New small pleural effusions. Assessment:     1. Metastatic adenocarcinoma of the pancreas    ECOG PS 1  Intent of Treatment - palliative  Prognosis: poor    Disease in the lung, peritoneal surface   Disease is unresectable    Receiving palliative chemotherapy   Gemcitabine and Abraxane, s/p Cycle 1 Day 1     Tolerating treatment   A detailed system by system evaluation of side effect was performed to assess chemotherapy related toxicity. Blood counts are acceptable. Results reviewed with the patient. Comes in with nausea and vomiting. Seems like gastroenteritis. Conservative management with anti-emetics, IV fluids etc.       2. HypoKalemia    KCL 10 mEq now in [facility]      3.  Elevated creatinine, acute renal insufficiency     Dehydration  Bolus 500 cc NS now in [facility]      Plan:       > Hold today treatment today  > NS bolus 500 cc with KCL 10 mEq now in [facility]  > Follow up next week        Signed by: Amber Pang MD                     July 8, 2018

## 2018-07-05 NOTE — PROGRESS NOTES
Problem: Chemotherapy Treatment  Goal: *Hemodynamically stable  Outcome: Progressing Towards Goal  Gemzar/Abranaxe C1D8 (HELD today and will be skipped) will resume on 7/11/18 with C1D15.

## 2018-07-05 NOTE — PROGRESS NOTES
Boy Houston is a 80 y.o. male here today for Met. Pancreatic Adenocarcinoma f/u. Patient noted in w/c being wheeled by grand daughter. Patient stated he called the EMT this morning for SOB; pt stated once he vomited the SOB was gone. Patient stated prior to vomiting he was having reflux and SOB. Patient stated he only takes [place]. Patient stated he has chest soreness d/t the vomiting. Patient states he has a cough; pt expectorating clear phlegm.      Visit Vitals    /73 (BP 1 Location: Right arm, BP Patient Position: Sitting)    Pulse 74    Temp 97.7 °F (36.5 °C) (Oral)    Resp 18    Ht 6' (1.829 m)    Wt 185 lb 7 oz (84.1 kg)    SpO2 93%    BMI 25.15 kg/m2

## 2018-07-05 NOTE — PROGRESS NOTES
Pt arrived to [facility] ambulatory in no acute distress at 1110 for Gemzar/Abraxane C1D8.      Assessment unremarkable except pt c/o of SOB BUI and at Rest.  Pt c/o of weakness in BLE. Per patients grand-daughter EMS was called around 6 am for pt c/o SOB and Chest pain. R chest port accessed without issue and positive blood return noted.  Lolly Bynum NP made aware of pt complaints, pt to office for a visit. Pt. Potassium 3.2. Orders placed for 1 run of Potassium 10 mEq IV and 500 ml Bolus of NS. Patient Vitals for the past 12 hrs:   Temp Pulse Resp BP SpO2   07/05/18 1553 - 70 16 113/74 97 %   07/05/18 1118 97.6 °F (36.4 °C) 79 22 148/87 97 %       Labs obtained,   Recent Results (from the past 12 hour(s))   CBC WITH 3 PART DIFF    Collection Time: 07/05/18 11:31 AM   Result Value Ref Range    WBC 4.8 4.1 - 11.1 K/uL    RBC 3.53 (L) 4.10 - 5.70 M/uL    HGB 12.3 12.1 - 17.0 g/dL    HCT 34.9 (L) 36.6 - 50.3 %    MCV 98.9 80.0 - 99.0 FL    MCH 34.8 (H) 26.0 - 34.0 PG    MCHC 35.2 30.0 - 36.5 g/dL    RDW 12.9 11.8 - 15.8 %    PLATELET 103 813 - 415 K/uL    NEUTROPHILS 81 (H) 32 - 75 %    MIXED CELLS 7 3.2 - 16.9 %    LYMPHOCYTES 13 12 - 49 %    ABS. NEUTROPHILS 3.9 1.8 - 8.0 K/UL    ABS. MIXED CELLS 0.3 0.2 - 1.2 K/uL    ABS. LYMPHOCYTES 0.6 (L) 0.8 - 3.5 K/UL    DF AUTOMATED     METABOLIC PANEL, BASIC    Collection Time: 07/05/18 11:31 AM   Result Value Ref Range    Sodium 134 (L) 136 - 145 mmol/L    Potassium 3.2 (L) 3.5 - 5.1 mmol/L    Chloride 96 (L) 97 - 108 mmol/L    CO2 28 21 - 32 mmol/L    Anion gap 10 5 - 15 mmol/L    Glucose 165 (H) 65 - 100 mg/dL    BUN 28 (H) 6 - 20 MG/DL    Creatinine 1.41 (H) 0.70 - 1.30 MG/DL    BUN/Creatinine ratio 20 12 - 20      GFR est AA 57 (L) >60 ml/min/1.73m2    GFR est non-AA 47 (L) >60 ml/min/1.73m2    Calcium 9.4 8.5 - 10.1 MG/DL   .     The following medications administered:   ml Bolus IV over 1 hour  NS Flush  Heparin Flush    Pt tolerated treatment well. No adverse reactions noted. IV flushed per policy and removed, 2x2 and paper tape placed.  Pt discharged ambulatory in no acute distress at 1610, accompanied by family.   Next appointment 7/11/18 @ 10 am.

## 2018-07-06 PROBLEM — K56.609 SBO (SMALL BOWEL OBSTRUCTION) (HCC): Status: ACTIVE | Noted: 2018-01-01

## 2018-07-06 PROBLEM — C25.9 METASTASIS FROM PANCREATIC CANCER (HCC): Status: ACTIVE | Noted: 2018-01-01

## 2018-07-06 PROBLEM — C79.9 METASTASIS FROM PANCREATIC CANCER (HCC): Status: ACTIVE | Noted: 2018-01-01

## 2018-07-06 NOTE — TELEPHONE ENCOUNTER
I received a call from Mr. Rosemary Silveira son stating Mr. Apple Riley is having diarrhea from his ostomy. He vomited 4 times yesterday morning after going out on the 4th of July. We gave him fluids in the infusion center as well as KCL. He then started with diarrhea last night and this morning. I asked for him to give his dad imodium now and my concern is dehydration if this continues. I asked for him to call me back this afternoon as he may need to go to the ED if he can not get this under control. This was discussed with Dr. Amena Branch.     Carolyn Clay NP

## 2018-07-06 NOTE — H&P
Surgery History and Physical    Subjective: Cory Kline is a 80 y.o. male who presents for evaluation of nausea, vomiting and diarrhea for 2 days. Pt attributes this to eating at [employer] and having chicken salad. Came to ER at recommendation of Oncology. CT scan shows a small bowel obstruction. CT scan is actually reviewed with radiologist to provide them pertinent history in evaluating study, what was read as a spigelian hernia is likely the site of his colostomy. Site  Of obstruction less clear and maybe a segment of small bowel( and in retrospect may have had disease present at previous setting). ? Parastomal hernia. Recurrence of LIH. RIH with fluid. Both inguinal hernias may have metastatic disease. Past Medical History:   Diagnosis Date    Arthritis     ECG abnormality 4/30/2018    GERD (gastroesophageal reflux disease)     High cholesterol     Hypertension     Other ill-defined conditions(799.89) ~2000    arm tingling to ER, per pt states was not a stroke, but has been on plavix and aggrenox since. currently on plavix    Sleep apnea with use of continuous positive airway pressure (CPAP)      Past Surgical History:   Procedure Laterality Date    HX CATARACT REMOVAL Bilateral 2008 and 2012    HX HERNIA REPAIR Left 04/28/2018    Reduced & repaired [facility] w/sigmoid resection & end colostomy    HX ORTHOPAEDIC Right     elbow     TOTAL KNEE ARTHROPLASTY Left 1999    TOTAL KNEE ARTHROPLASTY Right 2011      History reviewed. No pertinent family history. Social History   Substance Use Topics    Smoking status: Never Smoker    Smokeless tobacco: Never Used    Alcohol use No      Prior to Admission medications    Medication Sig Start Date End Date Taking? Authorizing Provider   metoclopramide HCl (REGLAN) 5 mg tablet Take 5 mg by mouth four (4) times daily as needed for Nausea.    Yes Historical Provider   Omeprazole delayed release (PRILOSEC D/R) 20 mg tablet Take 20 mg by mouth daily. Yes Historical Provider   loperamide (IMMODIUM) 2 mg tablet Take 2 mg by mouth daily as needed for Diarrhea. Yes Historical Provider   acetaminophen (TYLENOL EXTRA STRENGTH) 500 mg tablet Take 500 mg by mouth daily as needed for Pain. Yes Historical Provider   calcium carbonate (TUMS) 200 mg calcium (500 mg) chew Take 1 Tab by mouth four (4) times daily as needed ('Heart Burn/Acid Reflux'). Yes Historical Provider   lidocaine-prilocaine (EMLA) topical cream Apply  to affected area as needed for Pain. 18  Yes Galo Brothers NP   amLODIPine (NORVASC) 10 mg tablet Take 1 Tab by mouth daily. 5/3/18  Yes Sophie Campbell MD   hydroCHLOROthiazide (HYDRODIURIL) 25 mg tablet Take 1 Tab by mouth daily. Indications: hypertension 5/3/18  Yes Sophie Campbell MD   labetalol (NORMODYNE) 200 mg tablet Take 1 Tab by mouth two (2) times a day. Indications: hypertension 5/3/18  Yes Sophie Campbell MD   pravastatin (PRAVACHOL) 40 mg tablet Take 40 mg by mouth nightly. Indications: HYPERCHOLESTEROLEMIA   Yes Historical Provider   thiamine (VITAMIN B-1) 100 mg tablet Take 100 mg by mouth daily. Yes Historical Provider   vitamin E (AQUA GEMS) 400 unit capsule Take 800 Units by mouth daily. Yes Historical Provider   Cholecalciferol, Vitamin D3, (VITAMIN D3) 1,000 unit cap Take 2 Tabs by mouth daily. Yes Historical Provider      Allergies   Allergen Reactions    Aleve [Naproxen Sodium] Swelling     Throat swelling       Review of Systemsn see HPI/PMH    Objective:     Patient Vitals for the past 8 hrs:   BP Temp Pulse Resp SpO2 Height Weight   18 1900 106/60 - 70 18 94 % - -   18 1438 104/63 98.1 °F (36.7 °C) 77 17 96 % 6' (1.829 m) 185 lb 6.5 oz (84.1 kg)       Temp (24hrs), Av.1 °F (36.7 °C), Min:98.1 °F (36.7 °C), Max:98.1 °F (36.7 °C)      Physical Exam Physical Exam Constitutional: He is oriented to person, place, and time. He appears well-developed and well-nourished. No acute distress. NG in place:   Head: Normocephalic and atraumatic. Eyes: EOM are normal.  No scleral icterus. Neck:  No tracheal deviation present. Cardiovascular: Normal rate, regular rhythm  Pulmonary/Chest: Effort normal .   Abdominal: Soft. He exhibits distension. There is min generalized tenderness. LLQ ostomy with some liquid stool, area surrounding ostomy\"boggy\" ? Parastomal hernia. There is no rebound or guarding  Scar left groin, recurrence of hernia, soft. [address]  Musculoskeletal grossly wnl  Neurological: He is alert and oriented to person, place, and time. Skin: Skin is warm and dry. Psychiatric: He has a normal mood and affect. Assessment:     SBO ? partial    Metastatic Pancreatic CA    Plan:     Admit    NPO/NG  IV hydration  IV pain med, antiemetic, PPI, antibx    Serial labs, exams and imaging.     Onc consult    Signed By: Olivia Buckner MD   [facility]    July 6, 2018

## 2018-07-06 NOTE — PROGRESS NOTES
Pharmacy Clarification of Prior to Admission Medication Regimen     The patient was not interviewed regarding clarification of the prior to admission medication regimen drowsiness. Patient's granddaughter was present in room and provided information regarding the patient's PTA medication regimen and use of any other inhalers, topical products, over the counter medications, herbal medications, vitamin products or ophthalmic/nasal/otic medication use. Information Obtained From: Patient's Granddaughter, Home Medication List, and Rx Query    Pertinent Pharmacy Findings: None    PTA medication list was corrected to the following:     Prior to Admission Medications   Prescriptions Last Dose Informant Patient Reported? Taking? Cholecalciferol, Vitamin D3, (VITAMIN D3) 1,000 unit cap 7/6/2018 at Unknown time Family Member Yes Yes   Sig: Take 2 Tabs by mouth daily. Omeprazole delayed release (PRILOSEC D/R) 20 mg tablet 7/6/2018 at Unknown time Family Member Yes Yes   Sig: Take 20 mg by mouth daily. acetaminophen (TYLENOL EXTRA STRENGTH) 500 mg tablet 6/6/2018 at Unknown time Family Member Yes Yes   Sig: Take 500 mg by mouth daily as needed for Pain. amLODIPine (NORVASC) 10 mg tablet 7/6/2018 at Unknown time Family Member No Yes   Sig: Take 1 Tab by mouth daily. calcium carbonate (TUMS) 200 mg calcium (500 mg) chew 7/5/2018 at Unknown time Family Member Yes Yes   Sig: Take 1 Tab by mouth four (4) times daily as needed ('Heart Burn/Acid Reflux').   hydroCHLOROthiazide (HYDRODIURIL) 25 mg tablet 7/6/2018 at Unknown time Family Member No Yes   Sig: Take 1 Tab by mouth daily. Indications: hypertension   labetalol (NORMODYNE) 200 mg tablet 7/6/2018 at Unknown time Family Member No Yes   Sig: Take 1 Tab by mouth two (2) times a day. Indications: hypertension   lidocaine-prilocaine (EMLA) topical cream 7/4/2018 at Unknown time Family Member No Yes   Sig: Apply  to affected area as needed for Pain.    loperamide (IMMODIUM) 2 mg tablet 7/6/2018 at Unknown time Family Member Yes Yes   Sig: Take 2 mg by mouth daily as needed for Diarrhea.   metoclopramide HCl (REGLAN) 5 mg tablet 7/5/2018 at Unknown time Family Member Yes Yes   Sig: Take 5 mg by mouth four (4) times daily as needed for Nausea. pravastatin (PRAVACHOL) 40 mg tablet 7/5/2018 at Unknown time Family Member Yes Yes   Sig: Take 40 mg by mouth nightly. Indications: HYPERCHOLESTEROLEMIA   thiamine (VITAMIN B-1) 100 mg tablet 7/6/2018 at Unknown time Family Member Yes Yes   Sig: Take 100 mg by mouth daily. vitamin E (AQUA GEMS) 400 unit capsule 7/6/2018 at Unknown time Family Member Yes Yes   Sig: Take 800 Units by mouth daily.       Facility-Administered Medications: None          Thank you,  Tamir Mercado CPhT  Medication History Pharmacy Technician

## 2018-07-06 NOTE — ED NOTES
Bedside and Verbal shift change received from [facility], [facility]. Report included the following information: SBAR, ED Summary, MAR and Recent Results. Dr. Cuca Hartley in room to speak with patient.

## 2018-07-06 NOTE — ED NOTES
Received pt to figueroa bed for c/o N/V/D since Wednesday night after eating at [employer]. Pt has recent hx of cancer w/ colostomy that was placed in April.

## 2018-07-06 NOTE — IP AVS SNAPSHOT
Summary of Care Report The Summary of Care report has been created to help improve care coordination. Users with access to Openplay or [employer] (Web-based application) may access additional patient information including the Discharge Summary. If you are not currently a [address] user and need more information, please call the number listed below in the [place] [address] section and ask to be connected with Medical Records. Facility Information Name Address Phone [address] [address] [phone] Patient Information Patient Name Sex MUSA Ramirez ([id number]) Male 1926 Discharge Information Admitting Provider Service Area Unit Mer Mcwilliams MD / [phone] [address] [facility] 2 General Surgery / [phone] Discharge Provider Discharge Date/Time Discharge Disposition Destination (none) 2018 Afternoon (Pending) AHR (none) Patient Language Language ENGLISH [13] Hospital Problems as of 2018  Reviewed: 2018 10:37 PM by Danna Smith MD  
  
  
  
 Class Noted - Resolved Last Modified POA Active Problems SBO (small bowel obstruction) ([facility] [address]  2018 - Present 2018 by Mer Mcwilliams MD Unknown Entered by Mer Mcwilliams MD  
  Metastasis from pancreatic cancer [facility])  2018 - Present 2018 by Mer Mcwilliams MD Unknown Entered by Mer Mcwilliams MD  
  
Non-Hospital Problems as of 2018  Reviewed: 2018 10:37 PM by Danna Smith MD  
  
  
  
 Class Noted - Resolved Last Modified Active Problems   Pancreatic mass  2018 - Present 5/3/2018 by Lizzette Roman MD  
  Entered by Mer Mcwilliams MD  
  Hernia, inguinal, recurrent, with obstruction  2018 - Present 5/3/2018 by Lizzette Roman MD  
 Entered by Thania Zaragoza MD  
  ECG abnormality  4/30/2018 - Present 5/3/2018 by Tang Barboza MD  
  Entered by Vicky Corbin NP  
  HTN (hypertension)  5/3/2018 - Present 5/3/2018 by Tang Barboza MD  
  Entered by Tang Barboza MD  
  Primary pancreatic cancer with metastasis to other site [facility])  5/3/2018 - Present 5/3/2018 by Agusto Childs MD  
  Entered by Agusto Childs MD  
  
You are allergic to the following Allergen Reactions Aleve (Naproxen Sodium) Swelling Throat swelling Current Discharge Medication List  
  
CONTINUE these medications which have NOT CHANGED Dose & Instructions Dispensing Information Comments  
 amLODIPine 10 mg tablet Commonly known as:  Akila Em Dose:  10 mg Take 1 Tab by mouth daily. Quantity:  30 Tab Refills:  1  
   
 calcium carbonate 200 mg calcium (500 mg) Chew Commonly known as:  TUMS Dose:  1 Tab Take 1 Tab by mouth four (4) times daily as needed ('Heart Burn/Acid Reflux'). Refills:  0  
   
 hydroCHLOROthiazide 25 mg tablet Commonly known as:  HYDRODIURIL Dose:  25 mg Take 1 Tab by mouth daily. Indications: hypertension Quantity:  30 Tab Refills:  1  
   
 labetalol 200 mg tablet Commonly known as:  Littie Ates Dose:  200 mg Take 1 Tab by mouth two (2) times a day. Indications: hypertension Quantity:  60 Tab Refills:  1  
   
 lidocaine-prilocaine topical cream  
Commonly known as:  EMLA Apply  to affected area as needed for Pain. Quantity:  30 g Refills:  0  
   
 loperamide 2 mg tablet Commonly known as:  IMMODIUM Dose:  2 mg Take 2 mg by mouth daily as needed for Diarrhea. Refills:  0  
   
 metoclopramide HCl 5 mg tablet Commonly known as:  REGLAN Dose:  5 mg Take 5 mg by mouth four (4) times daily as needed for Nausea. Refills:  0 Omeprazole delayed release 20 mg tablet Commonly known as:  PRILOSEC D/R  Dose:  20 mg  
 Take 20 mg by mouth daily. Refills:  0  
   
 pravastatin 40 mg tablet Commonly known as:  PRAVACHOL Dose:  40 mg Take 40 mg by mouth nightly. Indications: HYPERCHOLESTEROLEMIA Refills:  0  
   
 TYLENOL EXTRA STRENGTH 500 mg tablet Generic drug:  acetaminophen Dose:  500 mg Take 500 mg by mouth daily as needed for Pain. Refills:  0  
   
 VITAMIN B-1 100 mg tablet Generic drug:  thiamine Dose:  100 mg Take 100 mg by mouth daily. Refills:  0  
   
 VITAMIN D3 1,000 unit Cap Generic drug:  cholecalciferol Dose:  2 Tab Take 2 Tabs by mouth daily. Refills:  0  
   
 vitamin E 400 unit capsule Commonly known as:  [address] Dose:  800 Units Take 800 Units by mouth daily. Refills:  0 ASK your doctor about these medications Dose & Instructions Dispensing Information Comments PRILOSEC PO Take  by mouth. Refills:  0 Follow-up Information Follow up With Details Comments Contact Saundra MAR [facility] On 7/11/2018 THIS IS YOUR HOME HEALTH AGENCY. IF YOU DO NOT HEAR FROM THEM WITHIN 24-48HOURS, PLEASE CONTACT THEM DIRECTLY [address] 
1st Floor [facility] [address] 
[phone] Discharge Instructions Bowel Blockage (Intestinal Obstruction): Care Instructions Your Care Instructions A bowel blockage, also called an intestinal obstruction, can prevent gas, fluids, or solids from moving through the intestines normally. It can cause constipation and, rarely, diarrhea. You may have pain, nausea, vomiting, and cramping. Most of the time, complete blockages require a stay in the hospital and possibly surgery. But if your bowel is only partly blocked, your doctor may tell you to wait until it clears on its own and you are able to pass gas and stool. If so, there are things you can do at home to help make you feel better.  
If you have had surgery for a bowel blockage, there are things you can do at home to make sure you heal well. You can also make some changes to keep your bowel from becoming blocked again. Follow-up care is a key part of your treatment and safety. Be sure to make and go to all appointments, and call your doctor if you are having problems. It's also a good idea to know your test results and keep a list of the medicines you take. How can you care for yourself at home? If your doctor has told you to wait at home for a blockage to clear on its own: · Follow your doctor's instructions. These may include eating a liquid diet to avoid complete blockage. · Be safe with medicines. Take your medicines exactly as prescribed. Call your doctor if you think you are having a problem with your medicine. · Put a heating pad set on low on your belly to relieve mild cramps and pain. To prevent another blockage · Try to eat smaller amounts of food more often. For example, have 5 or 6 small meals throughout the day instead of 2 or 3 large meals. · Chew your food very well. Try to chew each bite about 20 times or until it is liquid. · Avoid high-fiber foods and raw fruits and vegetables with skins, husks, strings, or seeds. These can form a ball of undigested material that can cause a blockage if a part of your bowel is scarred or narrowed. · Check with your doctor before you eat whole-grain products or use a fiber supplement such as Citrucel or Metamucil. · To help you have regular bowel movements, eat at regular times, do not strain during a bowel movement, and drink at least 8 to 10 glasses of water each day. If you have kidney, heart, or liver disease and have to limit fluids, talk with your doctor or before you increase the amount of fluids you drink. · Drink high-calorie liquid formulas if your doctor says to. Severe symptoms may make it hard for your body to take in vitamins and minerals. · Get regular exercise. It helps you digest your food better.  Get at least 30 minutes of physical activity on most days of the week. Walking is a good choice. When should you call for help? Call your doctor now or seek immediate medical care if: 
? · You have a fever. ? · You are vomiting. ? · You have new or worse belly pain. ? · You cannot pass stools or gas. ? Watch closely for changes in your health, and be sure to contact your doctor if you have any problems. Where can you learn more? Go to http://moises-aj.info/. Enter J054 in the search box to learn more about \"Bowel Blockage (Intestinal Obstruction): Care Instructions. \" Current as of: May 12, 2017 Content Version: 11.4 © 8508-7418 [employer]. Care instructions adapted under license by [employer] (which disclaims liability or warranty for this information). If you have questions about a medical condition or this instruction, always ask your healthcare professional. [place] [address] any warranty or liability for your use of this information. 
  
 
 
Chart Review Routing History Recipient Method Report Sent By Mallory Mcnulty MD Mariya Lines 2/4/2012  1:31 AM 02/04/2012 Provider Unknown, MD  
Patient not available to ask [address] Mail IP Auto Routed Notes Paola Major  [phone] 6/22/2018 11:27 AM 06/22/2018

## 2018-07-06 NOTE — ED NOTES
[facility] [address] Patient resting comfortably on stretcher with call bell within reach. Rates pain 0/10. Patient's stretcher in lowest position, with side rails up x2. Patient on the monitor x3. Patient updated on plan of care at this time. Patient's belongings are in close proximity. Patient assessed for all personal needs that may be completed by RN. No further complaints noted at this time. Patient's granddaughter at bedside. Call bell is within reach.

## 2018-07-06 NOTE — ED NOTES
Bedside and Verbal shift change report given to Rodrigo Fothergill, RN (oncoming nurse) by Rachael Gayle RN (offgoing nurse). Report included the following information SBAR, Kardex, ED Summary, Intake/Output, MAR, Recent Results, Med Rec Status and Cardiac Rhythm Sinus Rhythm.

## 2018-07-06 NOTE — ED NOTES
Patient placed on oxygen at this time. Patient sleeping and saturations dropped to 89%. Patient's saturations increased after receiving 3L oxygen via nasal cannula.

## 2018-07-06 NOTE — IP AVS SNAPSHOT
[address] [facility] 
[phone] Patient: Tanika Bauer MRN: [id number] CAV:4/1/4044 About your hospitalization You were admitted on:  July 6, 2018 You last received care in the:  \[facility]\"" 2 GENERAL SURGERY You were discharged on:  July 11, 2018 Why you were hospitalized Your primary diagnosis was:  Not on File Your diagnoses also included:  Sbo (Small Bowel Obstruction) (Hcc), Metastasis From Pancreatic Cancer (H[facility]) Follow-up Information Follow up With Details Comments Contact Info [facility] [place] On 7/11/2018 THIS IS YOUR HOME HEALTH AGENCY. IF YOU DO NOT HEAR FROM THEM WITHIN 24-48HOURS, PLEASE CONTACT THEM DIRECTLY [address] 
1st Floor [place] [address] 
[phone] Your Scheduled Appointments Wednesday July 25, 2018 10:00 AM EDT [facility]  
[place] ([address]) [address]  
[phone] Go to [facility], [address] [facility] [address] Wednesday August 01, 2018 10:00 AM EDT INFUSION 30 with [facility] CHAIR 1  
[place] ([address]) [address]  
[phone] Go to [facility], [address] [facility], [address] Wednesday August 08, 2018 10:00 AM EDT INFUSION 30 with [facility] CHAIR 1  
[place] ([address]) [address]  
[phone] Go to [facility], [address] [facility], [address] Discharge Orders None A check kari indicates which time of day the medication should be taken. My Medications CONTINUE taking these medications Instructions Each Dose to Equal  
 Morning Noon Evening Bedtime  
 amLODIPine 10 mg tablet Commonly known as:  Madhav Maradiaga Your last dose was: Your next dose is: Take 1 Tab by mouth daily. 10 mg  
    
   
   
   
  
 calcium carbonate 200 mg calcium (500 mg) Chew Commonly known as:  TUMS Your last dose was: Your next dose is: Take 1 Tab by mouth four (4) times daily as needed ('Heart Burn/Acid Reflux'). 1 Tab  
    
   
   
   
  
 hydroCHLOROthiazide 25 mg tablet Commonly known as:  HYDRODIURIL Your last dose was: Your next dose is: Take 1 Tab by mouth daily. Indications: hypertension 25 mg  
    
   
   
   
  
 labetalol 200 mg tablet Commonly known as:  Suella Cosier Your last dose was: Your next dose is: Take 1 Tab by mouth two (2) times a day. Indications: hypertension 200 mg  
    
   
   
   
  
 lidocaine-prilocaine topical cream  
Commonly known as:  EMLA Your last dose was: Your next dose is:    
   
   
 Apply  to affected area as needed for Pain. loperamide 2 mg tablet Commonly known as:  IMMODIUM Your last dose was: Your next dose is: Take 2 mg by mouth daily as needed for Diarrhea.  
 2 mg  
    
   
   
   
  
 metoclopramide HCl 5 mg tablet Commonly known as:  REGLAN Your last dose was: Your next dose is: Take 5 mg by mouth four (4) times daily as needed for Nausea. 5 mg Omeprazole delayed release 20 mg tablet Commonly known as:  PRILOSEC D/R Your last dose was: Your next dose is: Take 20 mg by mouth daily. 20 mg  
    
   
   
   
  
 pravastatin 40 mg tablet Commonly known as:  PRAVACHOL Your last dose was: Your next dose is: Take 40 mg by mouth nightly. Indications: HYPERCHOLESTEROLEMIA  40 mg  
 TYLENOL EXTRA STRENGTH 500 mg tablet Generic drug:  acetaminophen Your last dose was: Your next dose is: Take 500 mg by mouth daily as needed for Pain. 500 mg  
    
   
   
   
  
 VITAMIN B-1 100 mg tablet Generic drug:  thiamine Your last dose was: Your next dose is: Take 100 mg by mouth daily. 100 mg  
    
   
   
   
  
 VITAMIN D3 1,000 unit Cap Generic drug:  cholecalciferol Your last dose was: Your next dose is: Take 2 Tabs by mouth daily. 2 Tab  
    
   
   
   
  
 vitamin E 400 unit capsule Commonly known as:  [address] Your last dose was: Your next dose is: Take 800 Units by mouth daily. 800 Units ASK your doctor about these medications Instructions Each Dose to Equal  
 Morning Noon Evening Bedtime PRILOSEC PO Your last dose was: Your next dose is: Take  by mouth. Discharge Instructions Bowel Blockage (Intestinal Obstruction): Care Instructions Your Care Instructions A bowel blockage, also called an intestinal obstruction, can prevent gas, fluids, or solids from moving through the intestines normally. It can cause constipation and, rarely, diarrhea. You may have pain, nausea, vomiting, and cramping. Most of the time, complete blockages require a stay in the hospital and possibly surgery. But if your bowel is only partly blocked, your doctor may tell you to wait until it clears on its own and you are able to pass gas and stool. If so, there are things you can do at home to help make you feel better. If you have had surgery for a bowel blockage, there are things you can do at home to make sure you heal well. You can also make some changes to keep your bowel from becoming blocked again. Follow-up care is a key part of your treatment and safety. Be sure to make and go to all appointments, and call your doctor if you are having problems. It's also a good idea to know your test results and keep a list of the medicines you take. How can you care for yourself at home? If your doctor has told you to wait at home for a blockage to clear on its own: · Follow your doctor's instructions. These may include eating a liquid diet to avoid complete blockage. · Be safe with medicines. Take your medicines exactly as prescribed. Call your doctor if you think you are having a problem with your medicine. · Put a heating pad set on low on your belly to relieve mild cramps and pain. To prevent another blockage · Try to eat smaller amounts of food more often. For example, have 5 or 6 small meals throughout the day instead of 2 or 3 large meals. · Chew your food very well. Try to chew each bite about 20 times or until it is liquid. · Avoid high-fiber foods and raw fruits and vegetables with skins, husks, strings, or seeds. These can form a ball of undigested material that can cause a blockage if a part of your bowel is scarred or narrowed. · Check with your doctor before you eat whole-grain products or use a fiber supplement such as Citrucel or Metamucil. · To help you have regular bowel movements, eat at regular times, do not strain during a bowel movement, and drink at least 8 to 10 glasses of water each day. If you have kidney, heart, or liver disease and have to limit fluids, talk with your doctor or before you increase the amount of fluids you drink. · Drink high-calorie liquid formulas if your doctor says to. Severe symptoms may make it hard for your body to take in vitamins and minerals. · Get regular exercise. It helps you digest your food better. Get at least 30 minutes of physical activity on most days of the week. Walking is a good choice. When should you call for help? Call your doctor now or seek immediate medical care if: 
? · You have a fever. ? · You are vomiting. ? · You have new or worse belly pain. ? · You cannot pass stools or gas. ? Watch closely for changes in your health, and be sure to contact your doctor if you have any problems. Where can you learn more? Go to http://moises-aj.info/. Enter T165 in the search box to learn more about \"Bowel Blockage (Intestinal Obstruction): Care Instructions. \" Current as of: May 12, 2017 Content Version: 11.4 © 4324-4371 [employer]. Care instructions adapted under license by [employer] (which disclaims liability or warranty for this information). If you have questions about a medical condition or this instruction, always ask your healthcare professional. [address] any warranty or liability for your use of this information. 
  
 
 
  
  
  
Verix Announcement We are excited to announce that we are making your provider's discharge notes available to you in Verix. You will see these notes when they are completed and signed by the physician that discharged you from your recent hospital stay. If you have any questions or concerns about any information you see in Verix, please call the Health Information Department where you were seen or reach out to your Primary Care Provider for more information about your plan of care. Introducing [facility]! [facility] introduces Verix patient portal. Now you can access parts of your medical record, email your doctor's office, and request medication refills online. 1. In your internet browser, go to [email or web address] 2. Click on the First Time User? Click Here link in the Sign In box. You will see the New Member Sign Up page. 3. Enter your Verix Access Code exactly as it appears below.  You will not need to use this code after youve completed the sign-up process. If you do not sign up before the expiration date, you must request a new code. · Curiously Access Code: [email or web address] Expires: 7/27/2018  8:23 AM 
 
4. Enter the last four digits of your Social Security Number (xxxx) and Date of Birth (mm/dd/yyyy) as indicated and click Submit. You will be taken to the next sign-up page. 5. Create a Curiously ID. This will be your Curiously login ID and cannot be changed, so think of one that is secure and easy to remember. 6. Create a Curiously password. You can change your password at any time. 7. Enter your Password Reset Question and Answer. This can be used at a later time if you forget your password. 8. Enter your e-mail address. You will receive e-mail notification when new information is available in [address] 9. Click Sign Up. You can now view and download portions of your medical record. 10. Click the Download Summary menu link to download a portable copy of your medical information. If you have questions, please visit the Frequently Asked Questions section of the Curiously website. Remember, Curiously is NOT to be used for urgent needs. For medical emergencies, dial 911. Now available from your iPhone and Android! Introducing Rob Zepeda As a [facility] patient, I wanted to make you aware of our electronic visit tool called Rob Zepeda. [facility] 24/7 allows you to connect within minutes with a medical provider 24 hours a day, seven days a week via a mobile device or tablet or logging into a secure website from your computer. You can access Rob Zepeda from anywhere in the [place].  
 
A virtual visit might be right for you when you have a simple condition and feel like you just dont want to get out of bed, or cant get away from work for an appointment, when your regular [facility] Quarry provider is not available (evenings, weekends or holidays), or when youre out of town and need minor care. Electronic visits cost only $49 and if the [facility]ak Composeright/Jobyourlife provider determines a prescription is needed to treat your condition, one can be electronically transmitted to a nearby pharmacy*. Please take a moment to enroll today if you have not already done so. The enrollment process is free and takes just a few minutes. To enroll, please download the 3C Plus/Jobyourlife makenna to your tablet or phone, or visit [email or web address] to enroll on your computer. And, as an [address] [place] [address] patient with a b5media account, the results of your visits will be scanned into your electronic medical record and your primary care provider will be able to view the scanned results. We urge you to continue to see your regular [facility]rera [facility]ak provider for your ongoing medical care. And while your primary care provider may not be the one available when you seek a Selecticalesliefin virtual visit, the peace of mind you get from getting a real diagnosis real time can be priceless. For more information on Selecticalesliefin, view our Frequently Asked Questions (FAQs) at [email or web address]. Sincerely, 
 
Terrie Preito MD 
Chief Medical Officer [address] *:  certain medications cannot be prescribed via FluGen Providers Seen During Your Hospitalization Provider Specialty Primary office phone Patrica Johns DO Emergency Medicine [phone] [place] An, [address] [facility] General Surgery [phone] Your Primary Care Physician (PCP) Primary Care Physician Office Phone Office Fax [place] [phone] You are allergic to the following Allergen Reactions Aleve (Naproxen Sodium) Swelling Throat swelling Recent Documentation Height Weight BMI Smoking Status 1.829 m 84.1 kg 25.15 kg/m2 Never Smoker Emergency Contacts Name Discharge Info Relation Home Work Mobile Harmeet Aldana DISCHARGE CAREGIVER [3] Son [22] [phone] Isabell Nieves DISCHARGE CAREGIVER [3] Other Relative [6] [phone] Patient Belongings The following personal items are in your possession at time of discharge: 
  Dental Appliances: None  Visual Aid: Glasses, At bedside      Home Medications: None   Jewelry: None  Clothing: At bedside    Other Valuables: None Please provide this summary of care documentation to your next provider. Signatures-by signing, you are acknowledging that this After Visit Summary has been reviewed with you and you have received a copy. Patient Signature:  ____________________________________________________________ Date:  ____________________________________________________________  
  
[facility] Signature:  ____________________________________________________________ Date:  ____________________________________________________________

## 2018-07-07 NOTE — ED PROVIDER NOTES
EMERGENCY DEPARTMENT HISTORY AND PHYSICAL EXAM      Date: 7/6/2018  Patient Name: Aline Murphy    History of Presenting Illness     Chief Complaint   Patient presents with    Vomiting     Pt states he ate out on Wednesday night and started with vomiting and diarrhea nonstop since. History Provided By: Patient and patient's granddaughter    HPI: Aline Murphy, 80 y.o. male with PMHx significant for pancreatic caner, HTN, GERD, high cholesterol, presents ambulatory to the ED with cc of N/V alongside weakness,flatulence, diarrhea and cough with clear sputum ongoing for 3 days. Of note, pt received his first dose of chemotherapy last week. Pt reports that he saw oncologist yesterday for similar sxs but was told to rest. He notes that since onset of sxs, he has been less ambulatory. Per pt, he uses a cane to ambulate. Additionally he states that his colostomy bag has been filling with watery diarrhea. He endorses to taking potassium, TUMS and aleve this morning with no relief of sxs. Pt specifically denies any SOB, abdominal pain, HA, fevers, or chills. NO exacerbating or relieving factors. No other associated symptoms. Vomiting multiple times today. Chief Complaint: N/V  Duration: 3 days  Timing:  Gradual  Location:Diffuse abdominal pain  Quality: fullness  Severity: Moderate  Modifying Factors: denies any modifying factors  Associated Symptoms: weakness, flatulence, diarrhea, cough    There are no other complaints, changes, or physical findings at this time.     PCP: PROVIDER UNKNOWN  Oncologist: Julien Krishnan MD   Current Facility-Administered Medications   Medication Dose Route Frequency Provider Last Rate Last Dose    sodium chloride (NS) flush 5-10 mL  5-10 mL IntraVENous Q8H Juanjose Jolly MD        sodium chloride (NS) flush 5-10 mL  5-10 mL IntraVENous PRN Juanjose Jolly MD        HYDROmorphone (PF) (DILAUDID) injection 0.5 mg  0.5 mg IntraVENous Q4H PRN Juanjose Jolly MD  ondansetron (ZOFRAN) injection 4 mg  4 mg IntraVENous Q4H PRN Karl Duran MD        LORazepam (ATIVAN) injection 0.26 mg  0.26 mg IntraVENous Q6H PRN Karl Duran MD        0.9% sodium chloride with KCl 40 mEq/L infusion   IntraVENous CONTINUOUS Karl Duran MD        [START ON 7/7/2018] pantoprazole (PROTONIX) 40 mg in sodium chloride 0.9% 10 mL injection  40 mg IntraVENous DAILY Karl Duran MD        piperacillin-tazobactam (ZOSYN) 3.375 g in 0.9% sodium chloride (MBP/ADV) 100 mL  3.375 g IntraVENous Q8H Karl Duran MD        magnesium sulfate 2 g/50 ml IVPB (premix or compounded)  2 g IntraVENous ONCE Juan Devi, DO         Current Outpatient Prescriptions   Medication Sig Dispense Refill    metoclopramide HCl (REGLAN) 5 mg tablet Take 5 mg by mouth four (4) times daily as needed for Nausea.  Omeprazole delayed release (PRILOSEC D/R) 20 mg tablet Take 20 mg by mouth daily.  loperamide (IMMODIUM) 2 mg tablet Take 2 mg by mouth daily as needed for Diarrhea.  acetaminophen (TYLENOL EXTRA STRENGTH) 500 mg tablet Take 500 mg by mouth daily as needed for Pain.  calcium carbonate (TUMS) 200 mg calcium (500 mg) chew Take 1 Tab by mouth four (4) times daily as needed ('Heart Burn/Acid Reflux').  lidocaine-prilocaine (EMLA) topical cream Apply  to affected area as needed for Pain. 30 g 0    amLODIPine (NORVASC) 10 mg tablet Take 1 Tab by mouth daily. 30 Tab 1    hydroCHLOROthiazide (HYDRODIURIL) 25 mg tablet Take 1 Tab by mouth daily. Indications: hypertension 30 Tab 1    labetalol (NORMODYNE) 200 mg tablet Take 1 Tab by mouth two (2) times a day. Indications: hypertension 60 Tab 1    pravastatin (PRAVACHOL) 40 mg tablet Take 40 mg by mouth nightly. Indications: HYPERCHOLESTEROLEMIA      thiamine (VITAMIN B-1) 100 mg tablet Take 100 mg by mouth daily.  vitamin E (AQUA GEMS) 400 unit capsule Take 800 Units by mouth daily.       Cholecalciferol, Vitamin D3, (VITAMIN D3) 1,000 unit cap Take 2 Tabs by mouth daily. Past History     Past Medical History:  Past Medical History:   Diagnosis Date    Arthritis     ECG abnormality 4/30/2018    GERD (gastroesophageal reflux disease)     High cholesterol     Hypertension     Other ill-defined conditions(799.89) ~2000    arm tingling to ER, per pt states was not a stroke, but has been on plavix and aggrenox since. currently on plavix    Sleep apnea with use of continuous positive airway pressure (CPAP)      Past Surgical History:  Past Surgical History:   Procedure Laterality Date    HX CATARACT REMOVAL Bilateral 2008 and 2012    HX HERNIA REPAIR Left 04/28/2018    Reduced & repaired [facility] w/sigmoid resection & end colostomy    HX ORTHOPAEDIC Right     elbow     TOTAL KNEE ARTHROPLASTY Left 1999    TOTAL KNEE ARTHROPLASTY Right 2011     Family History:  History reviewed. No pertinent family history. Social History:  Social History   Substance Use Topics    Smoking status: Never Smoker    Smokeless tobacco: Never Used    Alcohol use No     Allergies: Allergies   Allergen Reactions    Aleve [Naproxen Sodium] Swelling     Throat swelling     Review of Systems   Review of Systems   Constitutional: Negative for chills and fever. HENT: Negative for congestion and sore throat. Eyes: Negative for visual disturbance. Respiratory: Positive for cough. Negative for shortness of breath. Cardiovascular: Negative for chest pain and leg swelling. Gastrointestinal: Positive for diarrhea, nausea and vomiting. Negative for abdominal pain and blood in stool.        (+)flatulence     Endocrine: Negative for polyuria. Genitourinary: Negative for dysuria and testicular pain. Musculoskeletal: Negative for arthralgias, joint swelling and myalgias. Skin: Negative for rash. Allergic/Immunologic: Negative for immunocompromised state. Neurological: Positive for weakness.  Negative for headaches. Hematological: Does not bruise/bleed easily. Psychiatric/Behavioral: Negative for confusion. Physical Exam   Physical Exam   Constitutional: He is oriented to person, place, and time. He appears well-developed and well-nourished. HENT:   Head: Normocephalic and atraumatic. Moist mucous membranes   Eyes: Conjunctivae are normal. Pupils are equal, round, and reactive to light. Right eye exhibits no discharge. Left eye exhibits no discharge. Neck: Normal range of motion. Neck supple. No tracheal deviation present. Cardiovascular: Normal rate, regular rhythm and normal heart sounds. No murmur heard. Pulmonary/Chest: Effort normal and breath sounds normal. No respiratory distress. He has no wheezes. He has no rales. Abdominal: Soft. Bowel sounds are normal. There is no tenderness. There is no rebound and no guarding. Genitourinary:   Genitourinary Comments: Brown stool in colostomy bag   Musculoskeletal: Normal range of motion. He exhibits no edema, tenderness or deformity. Neurological: He is alert and oriented to person, place, and time. Skin: Skin is warm and dry. No rash noted. No erythema. There is pallor (extremities). Psychiatric: His behavior is normal.   Nursing note and vitals reviewed.     Diagnostic Study Results     Labs -  Recent Results (from the past 12 hour(s))   CBC WITH AUTOMATED DIFF    Collection Time: 07/06/18  4:29 PM   Result Value Ref Range    WBC 2.7 (L) 4.1 - 11.1 K/uL    RBC 3.36 (L) 4.10 - 5.70 M/uL    HGB 11.8 (L) 12.1 - 17.0 g/dL    HCT 33.5 (L) 36.6 - 50.3 %    MCV 99.7 (H) 80.0 - 99.0 FL    MCH 35.1 (H) 26.0 - 34.0 PG    MCHC 35.2 30.0 - 36.5 g/dL    RDW 13.0 11.5 - 14.5 %    PLATELET 020 203 - 289 K/uL    MPV 10.0 8.9 - 12.9 FL    NRBC 0.0 0  WBC    ABSOLUTE NRBC 0.00 0.00 - 0.01 K/uL    NEUTROPHILS 46 32 - 75 %    BAND NEUTROPHILS 6 %    LYMPHOCYTES 21 12 - 49 %    MONOCYTES 25 (H) 5 - 13 %    EOSINOPHILS 0 0 - 7 %    BASOPHILS 1 0 - 1 % METAMYELOCYTES 1 %    IMMATURE GRANULOCYTES 0 0.0 - 0.5 %    ABS. NEUTROPHILS 1.4 (L) 1.8 - 8.0 K/UL    ABS. LYMPHOCYTES 0.6 (L) 0.8 - 3.5 K/UL    ABS. MONOCYTES 0.7 0.0 - 1.0 K/UL    ABS. EOSINOPHILS 0.0 0.0 - 0.4 K/UL    ABS. BASOPHILS 0.0 0.0 - 0.1 K/UL    ABS. IMM. GRANS. 0.0 0.00 - 0.04 K/UL    DF AUTOMATED      RBC COMMENTS NORMOCYTIC, NORMOCHROMIC     METABOLIC PANEL, COMPREHENSIVE    Collection Time: 07/06/18  4:29 PM   Result Value Ref Range    Sodium 131 (L) 136 - 145 mmol/L    Potassium 3.2 (L) 3.5 - 5.1 mmol/L    Chloride 96 (L) 97 - 108 mmol/L    CO2 25 21 - 32 mmol/L    Anion gap 10 5 - 15 mmol/L    Glucose 183 (H) 65 - 100 mg/dL    BUN 33 (H) 6 - 20 MG/DL    Creatinine 1.33 (H) 0.70 - 1.30 MG/DL    BUN/Creatinine ratio 25 (H) 12 - 20      GFR est AA >60 >60 ml/min/1.73m2    GFR est non-AA 50 (L) >60 ml/min/1.73m2    Calcium 9.0 8.5 - 10.1 MG/DL    Bilirubin, total 1.2 (H) 0.2 - 1.0 MG/DL    ALT (SGPT) 26 12 - 78 U/L    AST (SGOT) 24 15 - 37 U/L    Alk. phosphatase 69 45 - 117 U/L    Protein, total 6.1 (L) 6.4 - 8.2 g/dL    Albumin 3.0 (L) 3.5 - 5.0 g/dL    Globulin 3.1 2.0 - 4.0 g/dL    A-G Ratio 1.0 (L) 1.1 - 2.2     LIPASE    Collection Time: 07/06/18  4:29 PM   Result Value Ref Range    Lipase 43 (L) 73 - 393 U/L   LACTIC ACID    Collection Time: 07/06/18  4:29 PM   Result Value Ref Range    Lactic acid 1.9 0.4 - 2.0 MMOL/L   TYPE & SCREEN    Collection Time: 07/06/18  4:29 PM   Result Value Ref Range    Crossmatch Expiration 07/09/2018     ABO/Rh(D) Jane Cortez POSITIVE     Antibody screen NEG    MAGNESIUM    Collection Time: 07/06/18  4:29 PM   Result Value Ref Range    Magnesium 1.4 (L) 1.6 - 2.4 mg/dL     Radiologic Studies -   XR ABD (KUB)   Final Result   Initial Result:     Impression:     IMPRESSION: Single supine upright view of the upper abdomen demonstrates NG tube  tip and side port overlie the expected location of the stomach.     Narrative:     INDICATION: NG tube placement.         CT Results (Last 48 hours)               07/06/18 1638  CT ABD PELV W CONT Final result    Impression:  IMPRESSION:       Small bowel obstruction related to spigelian hernia on the left. Left inguinal   hernia contains large bowel and does not cause obstruction. Smaller right   inguinal hernia containing fluid. Cystic and solid mass of the pancreas not significantly changed. Multiple pulmonary nodules and small pleural effusions. The findings were called to Nu Ann the charge nurse in the ER at [facility] [address] on   7/6/2018 at 4:57 PM by Dr. Tanmay Dent. [id number]                   Narrative:  EXAM:  CT ABD PELV W CONT   Clinical history: Abdominal pain   INDICATION: Abdominal pain       COMPARISON: 4/28/2010       CONTRAST:  100 mL of Isovue-370. TECHNIQUE:    Following the uneventful intravenous administration of contrast, thin axial   images were obtained through the abdomen and pelvis. Coronal and sagittal   reconstructions were generated. Oral contrast was not administered. CT dose   reduction was achieved through use of a standardized protocol tailored for this   examination and automatic exposure control for dose modulation. FINDINGS:    LUNG BASES: Numerous nodules and masses. Minimal bilateral effusions. INCIDENTALLY IMAGED HEART AND MEDIASTINUM: Trace pericardial fluid. Esophageal   wall thickening and hyperemia. LIVER: No mass or biliary dilatation. GALLBLADDER: Cholelithiasis without inflammatory change. SPLEEN: No mass. PANCREAS: Heterogeneously cystic and solid lesion in the tail and body,   measuring 6.7 x 4.8 x 4.6 cm, containing calcification without obvious   associated ductal dilatation. Pancreatic tail atrophy. ADRENALS: Unremarkable. KIDNEYS: No mass, calculus, or hydronephrosis. STOMACH: Distended with fluid. SMALL BOWEL: Increased distention of small bowel loops with decompressed distal   small bowel loop. Decompressed colon as well.  Transition point related to a spigelian hernia on the left. COLON: There is a left inguinal hernia as well this is not the current source of   obstruction. Right inguinal hernia contains fluid. . Numerous diverticula in the   sigmoid colon. APPENDIX: Not well seen. PERITONEUM: No pneumoperitoneum. Ascites. RETROPERITONEUM: No lymphadenopathy or aortic aneurysm. Atherosclerotic change. REPRODUCTIVE ORGANS: Enlarged prostate. URINARY BLADDER: No mass or calculus. BONES: No destructive bone lesion. ADDITIONAL COMMENTS:            Medical Decision Making   I am the first provider for this patient. I reviewed the vital signs, available nursing notes, past medical history, past surgical history, family history and social history. Vital Signs-Reviewed the patient's vital signs. Patient Vitals for the past 12 hrs:   Temp Pulse Resp BP SpO2   07/06/18 1900 - 70 18 106/60 94 %   07/06/18 1438 98.1 °F (36.7 °C) 77 17 104/63 96 %     Pulse Oximetry Analysis - 94% on RA    Records Reviewed: Nursing Notes, Old Medical Records and Previous Laboratory Studies    Provider Notes (Medical Decision Making):   Patient presents with abdominal pain. DDx: Gastroenteritis, SBO, appendicitis, colitis, IBD, diverticulitis, mesenteric ischemia, AAA or descending dissection, ACS, ureteral stone. Will get labs and CT Abdomen/pelvis. ED Course:   Initial assessment performed. The patients presenting problems have been discussed, and they are in agreement with the care plan formulated and outlined with them. I have encouraged them to ask questions as they arise throughout their visit. Consult Note:  5:48 PM  Nikolay Albright DO spoke with Nicolette Florence. Silvino Montano MD  Specialty: general surgery  Discussed pt's hx, disposition, and available diagnostic and imaging results. Reviewed care plans. Consultant recommends NG tube placement and will admit pt. Critical Care Time: 0    Disposition:  Admit Note:  5:48 PM  Pt is being admitted by Dr. Silvino Montano.  The results of their tests and reason(s) for their admission have been discussed with pt and/or available family. They convey agreement and understanding for the need to be admitted and for admission diagnosis. PLAN:  1. Admit to Dr. Luther Mcintyre  Diagnosis     Clinical Impression:   1. Metastasis from pancreatic cancer ([facility] [address]    2. SBO (small bowel obstruction) ([facility] [address]      Attestations: This note is prepared by Daly Nagy, acting as a Scribe for Drake eLahy DO. Drake Leahy DO: The scribe's documentation has been prepared under my direction and personally reviewed by me in its entirety. I confirm that the notes above accurately reflects all work, treatment, procedures, and medical decision making performed by me.

## 2018-07-07 NOTE — CONSULTS
[facility] [place] at [address], [address] [place] [address] May W: [phone]  F: [phone] Reason for Visit:  
Daisy Loyd is a 80 y.o. male who is seen in consultation at the request of Dr. Glynis Gowers for evaluation of metastatic pancreatic cancer. Treatment History: 1. 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  
 one dose of gem/abraxane 6/27/18 History of Present Illness:  
Chemotherapy held on 7/5/18 due to vomiting and pt not feeling well. Presented to ED on 7/6/18 with nausea and vomiting and diarrhea for 2 days. CT scan shows SBO. Past Medical History:  
Diagnosis Date  Arthritis  ECG abnormality 4/30/2018  GERD (gastroesophageal reflux disease)  High cholesterol  Hypertension  Other ill-defined conditions(799.89) ~2000  
 arm tingling to ER, per pt states was not a stroke, but has been on plavix and aggrenox since. currently on plavix  Sleep apnea with use of continuous positive airway pressure (CPAP) Past Surgical History:  
Procedure Laterality Date  HX CATARACT REMOVAL Bilateral 2008 and 2012  HX HERNIA REPAIR Left 04/28/2018 Reduced & repaired [facility] w/sigmoid resection & end colostomy  HX ORTHOPAEDIC Right   
 elbow [place] ARTHROPLASTY Left 1999  TOTAL KNEE ARTHROPLASTY Right 2011 Social History Substance Use Topics  Smoking status: Never Smoker  Smokeless tobacco: Never Used  Alcohol use No  
  
History reviewed. No pertinent family history. Current Facility-Administered Medications Medication Dose Route Frequency  sodium chloride (NS) flush 5-10 mL  5-10 mL IntraVENous Q8H  
 sodium chloride (NS) flush 5-10 mL  5-10 mL IntraVENous PRN  
 HYDROmorphone (PF) (DILAUDID) injection 0.5 mg  0.5 mg IntraVENous Q4H PRN  
 ondansetron (ZOFRAN) injection 4 mg  4 mg IntraVENous Q4H PRN  
 LORazepam (ATIVAN) injection 0.26 mg  0.26 mg IntraVENous Q6H PRN  
 0.9% sodium chloride with KCl 40 mEq/L infusion   IntraVENous CONTINUOUS  
 pantoprazole (PROTONIX) 40 mg in sodium chloride 0.9% 10 mL injection  40 mg IntraVENous DAILY  piperacillin-tazobactam (ZOSYN) 3.375 g in 0.9% sodium chloride (MBP/ADV) 100 mL  3.375 g IntraVENous Q8H Allergies Allergen Reactions  Aleve [Naproxen Sodium] Swelling Throat swelling Review of Systems: A complete review of systems was obtained, negative except as described above. Physical Exam:  
 
Visit Vitals  /74  Pulse 84  Temp 99.1 °F (37.3 °C)  Resp 16  
 Ht 6' (1.829 m)  Wt 185 lb 6.5 oz (84.1 kg)  SpO2 94%  BMI 25.15 kg/m2 ECOG PS: 0 General: No distress, lethargic Eyes: PERRLA, anicteric sclerae HENT: Atraumatic with normal appearance of ears and nose; OP clear; NGT in place Neck: Supple; no thyromegaly Respiratory: CTAB, normal respiratory effort CV: Normal rate, regular rhythm, no murmurs, no peripheral edema GI: Soft, nontender, nondistended, no masses, no hepatomegaly, no splenomegaly; decreased BS 
MS: Normal gait and station. Digits without clubbing or cyanosis. Skin: No rashes, ecchymoses, or petechiae. Normal temperature, turgor, and texture. Neuro/Psych: lethargic Results:  
 
Lab Results Component Value Date/Time WBC 3.5 (L) 07/07/2018 07:06 AM  
 HGB 12.3 07/07/2018 07:06 AM  
 HCT 34.2 (L) 07/07/2018 07:06 AM  
 PLATELET 869 32/02/5322 07:06 AM  
 MCV 98.8 07/07/2018 07:06 AM  
 ABS. NEUTROPHILS 1.4 (L) 07/06/2018 04:29 PM  
 
Lab Results Component Value Date/Time  Sodium 134 (L) 07/07/2018 07:06 AM  
 Potassium 3.1 (L) 07/07/2018 07:06 AM  
 Chloride 98 07/07/2018 07:06 AM  
 CO2 23 07/07/2018 07:06 AM  
 Glucose 173 (H) 07/07/2018 07:06 AM  
 BUN 30 (H) 07/07/2018 07:06 AM  
 Creatinine 1.22 07/07/2018 07:06 AM  
 GFR est AA >60 07/07/2018 07:06 AM  
 GFR est non-AA 56 (L) 07/07/2018 07:06 AM  
 Calcium 8.3 (L) 07/07/2018 07:06 AM  
 Glucose (POC) 117 (H) 11/30/2009 04:31 PM  
 
Lab Results Component Value Date/Time Bilirubin, total 1.2 (H) 07/07/2018 07:06 AM  
 ALT (SGPT) 27 07/07/2018 07:06 AM  
 AST (SGOT) 21 07/07/2018 07:06 AM  
 Alk. phosphatase 61 07/07/2018 07:06 AM  
 Protein, total 5.6 (L) 07/07/2018 07:06 AM  
 Albumin 2.5 (L) 07/07/2018 07:06 AM  
 Globulin 3.1 07/07/2018 07:06 AM  
 
7/7/18 CT a/p FINDINGS:  
LUNG BASES: Numerous nodules and masses. Minimal bilateral effusions. 
  
INCIDENTALLY IMAGED HEART AND MEDIASTINUM: Trace pericardial fluid. Esophageal 
wall thickening and hyperemia. LIVER: No mass or biliary dilatation. GALLBLADDER: Cholelithiasis without inflammatory change. SPLEEN: No mass. PANCREAS: Heterogeneously cystic and solid lesion in the tail and body, 
measuring 6.7 x 4.8 x 4.6 cm, containing calcification without obvious 
associated ductal dilatation. Pancreatic tail atrophy. 
  
ADRENALS: Unremarkable. KIDNEYS: No mass, calculus, or hydronephrosis. STOMACH: Distended with fluid. SMALL BOWEL: Increased distention of small bowel loops with decompressed distal 
small bowel loop. Decompressed colon as well. Transition point related to a 
spigelian hernia on the left. COLON: There is a left inguinal hernia as well this is not the current source of 
obstruction. Right inguinal hernia contains fluid. . Numerous diverticula in the 
sigmoid colon. APPENDIX: Not well seen. PERITONEUM: No pneumoperitoneum. Ascites. RETROPERITONEUM: No lymphadenopathy or aortic aneurysm. Atherosclerotic change. REPRODUCTIVE ORGANS: Enlarged prostate. URINARY BLADDER: No mass or calculus. BONES: No destructive bone lesion. ADDITIONAL COMMENTS:  
IMPRESSION IMPRESSION: 
  
Small bowel obstruction related to spigelian hernia on the left. Left inguinal 
hernia contains large bowel and does not cause obstruction.  Smaller right 
inguinal hernia containing fluid. 
Cystic and solid mass of the pancreas not significantly changed. Multiple pulmonary nodules and small pleural effusions. Assessment and Recommendations: 1. Partial SBO: new;  Reviewed Dr. Trevor Orta note; agree with NGT, NPO, IVF; my concern as well is that this is related to #2 2. Metastatic pancreatic cancer, stage IV, to lungs and peritoneum: S/p one dose of chemotherapy. Goal of therapy is palliative. If SBO is likely to be recurrent, would recommend hospice. Will consult palliative care and they and Dr. Geeta Alejo can discuss with patient pending improvement or not of SBO over the next 2 days. Thank you for this consult. All of the patient's questions were answered today.  
 
 
Signed By: Jessica Amador MD

## 2018-07-07 NOTE — PROGRESS NOTES
Problem: Falls - Risk of 
Goal: *Absence of Falls Document Zahira President Fall Risk and appropriate interventions in the flowsheet. Outcome: Progressing Towards Goal 
Fall Risk Interventions: 
Mobility Interventions: Assess mobility with egress test, Patient to call before getting OOB Mentation Interventions: Adequate sleep, hydration, pain control, Door open when patient unattended, Toileting rounds Medication Interventions: Evaluate medications/consider consulting pharmacy Elimination Interventions: Call light in reach, Toileting schedule/hourly rounds, Urinal in reach Problem: Patient Education: Go to Patient Education Activity Goal: Patient/Family Education Outcome: Progressing Towards Goal 
Pt using urinal and not attempting to get OOB. Pt did remove his NGT but was otherwise not showing signs/symptoms of impulsivity or restlessness. Problem: Pressure Injury - Risk of 
Goal: *Prevention of pressure injury Document Trevor Scale and appropriate interventions in the flowsheet. Outcome: Progressing Towards Goal 
Pressure Injury Interventions: 
Sensory Interventions: Float heels Moisture Interventions: Maintain skin hydration (lotion/cream) Activity Interventions: Increase time out of bed Mobility Interventions: Float heels Nutrition Interventions: Document food/fluid/supplement intake Friction and Shear Interventions: HOB 30 degrees or less Problem: Patient Education: Go to Patient Education Activity Goal: Patient/Family Education Outcome: Progressing Towards Goal 
Pt without skin issues. Stoma appears appropriately pink; colostomy bag is intact.

## 2018-07-07 NOTE — ROUTINE PROCESS
TRANSFER - OUT REPORT:    Verbal report given to César Jensen RN(name) on Chandler Mediate  being transferred to [facility](unit) for routine progression of care       Report consisted of patients Situation, Background, Assessment and   Recommendations(SBAR). Information from the following report(s) SBAR, ED Summary, [facility] and Recent Results was reviewed with the receiving nurse. Lines:   Venous Access Device [employer] Lot [id number] 06/22/18 Upper chest (subclavicular area, right (Active)        Opportunity for questions and clarification was provided.       Patient transported with:   [employer]

## 2018-07-07 NOTE — PROGRESS NOTES
Pt found having pulled at his NGT sufficiently that tube was coiled inside of his mouth. Tube completely removed and charge RN notified. AM RN also notified; will follow up.

## 2018-07-07 NOTE — PROGRESS NOTES
12 Danish NG tube placed in R nare at 0835. Xray ordered to confirm placement. Patient tolerated well. 7480 Patient off of floor to radiology for chest xray 1010 NGT placed, 16 [place], placement confirmation with auscultation of air and gastric content aspiration. 1630 Patient off of floor to radiology for imaging

## 2018-07-07 NOTE — ROUTINE PROCESS
TRANSFER - IN REPORT:    Verbal report received from Robert Gamez RN on Raine May  being received from Emergency Department for routine progression of care      Report consisted of patients Situation, Background, Assessment and   Recommendations(SBAR). Information from the following report(s) SBAR, Kardex, ED Summary, Procedure Summary, Intake/Output, MAR, Recent Results and Cardiac Rhythm (SB to NSR c HR 50s-70s) was reviewed with the receiving nurse. Opportunity for questions and clarification was provided. Assessment to be completed upon patients arrival to unit.

## 2018-07-07 NOTE — PROGRESS NOTES
Pharmacy Automatic Renal Dosing Protocol - Antimicrobials    Indication for Antimicrobials:     Current Regimen of Each Antimicrobial:  Piperacillin tazobactam 2.25 grams Q6H (Start Date ; Day # 1)    Previous Antimicrobial Therapy:    Vancomycin Goal Level:     Vancomycin Levels  Date Dose & Interval Measured (mcg/mL) Steady State (mcg/mL)                           Significant Cultures:     Radiology / Imaging results: (X-ray, CT scan or MRI):       Labs:  Recent Labs      18   1629  18   1131   CREA  1.33*  1.41*   BUN  33*  28*   WBC  2.7*  4.8     Temp (24hrs), Av.1 °F (36.7 °C), Min:98.1 °F (36.7 °C), Max:98.1 °F (36.7 °C)        Paralysis, amputations, malnutrition: No  Creatinine Clearance (mL/min) or Dialysis: 39    Impression/Plan:   Piperacillin tazobactam 3.375 grams Q8H per renal dosing protocol. [facility] daily     Pharmacy will follow daily and adjust medications as appropriate for renal function and/or serum levels. Thank you,  Rajat Stewart, [facility]      Recommended duration of therapy  [email or web address]/[place]/[facility]/Pharmacy/Clinical%20Companion/Duration%20of%20ABX%20therapy. docx    Renal Dosing  [email or web address]/[place]/[facility]/Pharmacy/Clinical%20Companion/Renal%[email or web address]. pdf

## 2018-07-08 NOTE — PROGRESS NOTES
General Surgery End of Shift Nursing Note    Bedside shift change report given to Kamryn Bradley RN (oncoming nurse) by Jennifer Bethea RN (offgoing nurse). Report included the following information SBAR, Kardex, Intake/Output, MAR and Recent Results. Shift worked:   7A to [employer] of shift:    Abd Xray tomorrow. Uses CPAP at home at night,  Uses N/C here at 2L/min N/C overnight, no oxygen during the day. Palliative consult ordered, not seen yet. Issues for physician to address:        Number times ambulated in hallway past shift: 1    Number of times OOB to chair past shift: 2    Pain Management:  Current medication: none  Patient states pain is manageable on current pain medication: YES    GI:    Current diet:  DIET NPO    Tolerating current diet: YES  Passing flatus: YES  Last Bowel Movement: today   Appearance: watery brown in colostomy, +flatus    Respiratory:    Incentive Spirometer at bedside: NO  Patient instructed on use: NO    Patient Safety:    Falls Score: 2  Bed Alarm On? No  Sitter?  No    Gia Bone RN

## 2018-07-08 NOTE — PROGRESS NOTES
Cancer [place] at [place] [address] [address] [facility], [address] [place] [address] 100[address] [facility] W: [phone]  F: [phone] Reason for Visit:  
Sunday Friend is a 80 y.o. male who is seen in consultation at the request of Dr. Daryle Bun for evaluation of metastatic pancreatic cancer. Treatment History: 1. 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  
 one dose of gem/abraxane 6/27/18 History of Present Illness:  
Chemotherapy held on 7/5/18 due to vomiting and pt not feeling well. Presented to ED on 7/6/18 with nausea and vomiting and diarrhea for 2 days. CT scan shows SBO. Interval history:  Alert this am, feeling better. Past Medical History:  
Diagnosis Date  Arthritis  ECG abnormality 4/30/2018  GERD (gastroesophageal reflux disease)  High cholesterol  Hypertension  Other ill-defined conditions(799.89) ~2000  
 arm tingling to ER, per pt states was not a stroke, but has been on plavix and aggrenox since. currently on plavix  Sleep apnea with use of continuous positive airway pressure (CPAP) Past Surgical History:  
Procedure Laterality Date  HX CATARACT REMOVAL Bilateral 2008 and 2012  HX HERNIA REPAIR Left 04/28/2018 Reduced & repaired [facility] w/sigmoid resection & end colostomy  HX ORTHOPAEDIC Right   
 elbow [place] ARTHROPLASTY Left 1999  TOTAL KNEE ARTHROPLASTY Right 2011 Social History Substance Use Topics  Smoking status: Never Smoker  Smokeless tobacco: Never Used  Alcohol use No  
  
History reviewed. No pertinent family history. Current Facility-Administered Medications Medication Dose Route Frequency  sodium chloride (NS) flush 5-10 mL  5-10 mL IntraVENous Q8H  
 sodium chloride (NS) flush 5-10 mL  5-10 mL IntraVENous PRN  
 HYDROmorphone (PF) (DILAUDID) injection 0.5 mg  0.5 mg IntraVENous Q4H PRN  
 ondansetron (ZOFRAN) injection 4 mg  4 mg IntraVENous Q4H PRN  
 LORazepam (ATIVAN) injection 0.26 mg  0.26 mg IntraVENous Q6H PRN  
 0.9% sodium chloride with KCl 40 mEq/L infusion   IntraVENous CONTINUOUS  
 pantoprazole (PROTONIX) 40 mg in sodium chloride 0.9% 10 mL injection  40 mg IntraVENous DAILY  piperacillin-tazobactam (ZOSYN) 3.375 g in 0.9% sodium chloride (MBP/ADV) 100 mL  3.375 g IntraVENous Q8H Allergies Allergen Reactions  Aleve [Naproxen Sodium] Swelling Throat swelling Review of Systems: A complete review of systems was obtained, negative except as described above. Physical Exam:  
 
Visit Vitals  /65  Pulse 77  Temp 97.6 °F (36.4 °C)  Resp 17  Ht 6' (1.829 m)  Wt 185 lb 6.5 oz (84.1 kg)  SpO2 95%  BMI 25.15 kg/m2 ECOG PS: 0 General: No distress Eyes: PERRLA, anicteric sclerae HENT: Atraumatic with normal appearance of ears and nose; OP clear; NGT in place Neck: Supple; no thyromegaly Respiratory: CTAB, normal respiratory effort CV: Normal rate, regular rhythm, no murmurs, no peripheral edema GI: Soft, nontender, nondistended, no masses, no hepatomegaly, no splenomegaly; decreased BS 
MS: Normal gait and station. Digits without clubbing or cyanosis. Skin: No rashes, ecchymoses, or petechiae. Normal temperature, turgor, and texture. Neuro/Psych: appropriate Results:  
 
Lab Results Component Value Date/Time WBC 8.2 07/08/2018 05:10 AM  
 HGB 10.8 (L) 07/08/2018 05:10 AM  
 HCT 31.6 (L) 07/08/2018 05:10 AM  
 PLATELET 039 65/98/0418 05:10 AM  
 .6 (H) 07/08/2018 05:10 AM  
 ABS. NEUTROPHILS 1.4 (L) 07/06/2018 04:29 PM  
 
Lab Results Component Value Date/Time  Sodium 138 07/08/2018 05:10 AM  
 Potassium 3.5 07/08/2018 05:10 AM  
 Chloride 104 07/08/2018 05:10 AM  
 CO2 25 07/08/2018 05:10 AM  
 Glucose 122 (H) 07/08/2018 05:10 AM  
 BUN 32 (H) 07/08/2018 05:10 AM  
 Creatinine 1.19 07/08/2018 05:10 AM  
 GFR est AA >60 07/08/2018 05:10 AM  
 GFR est non-AA 57 (L) 07/08/2018 05:10 AM  
 Calcium 7.8 (L) 07/08/2018 05:10 AM  
 Glucose (POC) 117 (H) 11/30/2009 04:31 PM  
 
Lab Results Component Value Date/Time Bilirubin, total 1.2 (H) 07/07/2018 07:06 AM  
 ALT (SGPT) 27 07/07/2018 07:06 AM  
 AST (SGOT) 21 07/07/2018 07:06 AM  
 Alk. phosphatase 61 07/07/2018 07:06 AM  
 Protein, total 5.6 (L) 07/07/2018 07:06 AM  
 Albumin 2.5 (L) 07/07/2018 07:06 AM  
 Globulin 3.1 07/07/2018 07:06 AM  
 
7/7/18 CT a/p FINDINGS:  
LUNG BASES: Numerous nodules and masses. Minimal bilateral effusions. 
  
INCIDENTALLY IMAGED HEART AND MEDIASTINUM: Trace pericardial fluid. Esophageal 
wall thickening and hyperemia. LIVER: No mass or biliary dilatation. GALLBLADDER: Cholelithiasis without inflammatory change. SPLEEN: No mass. PANCREAS: Heterogeneously cystic and solid lesion in the tail and body, 
measuring 6.7 x 4.8 x 4.6 cm, containing calcification without obvious 
associated ductal dilatation. Pancreatic tail atrophy. 
  
ADRENALS: Unremarkable. KIDNEYS: No mass, calculus, or hydronephrosis. STOMACH: Distended with fluid. SMALL BOWEL: Increased distention of small bowel loops with decompressed distal 
small bowel loop. Decompressed colon as well. Transition point related to a 
spigelian hernia on the left. COLON: There is a left inguinal hernia as well this is not the current source of 
obstruction. Right inguinal hernia contains fluid. . Numerous diverticula in the 
sigmoid colon. APPENDIX: Not well seen. PERITONEUM: No pneumoperitoneum. Ascites. RETROPERITONEUM: No lymphadenopathy or aortic aneurysm. Atherosclerotic change. REPRODUCTIVE ORGANS: Enlarged prostate. URINARY BLADDER: No mass or calculus. BONES: No destructive bone lesion. ADDITIONAL COMMENTS:  
IMPRESSION IMPRESSION: 
  
Small bowel obstruction related to spigelian hernia on the left. Left inguinal 
hernia contains large bowel and does not cause obstruction. Smaller right 
inguinal hernia containing fluid. Cystic and solid mass of the pancreas not significantly changed. Multiple pulmonary nodules and small pleural effusions. 7/8/18 abd XR IMPRESSION: Improving small bowel obstruction. Evidence for jejunal edema. 
  
 
Assessment and Recommendations: 1. Partial SBO: new;  Reviewed Dr. Daniella Joshi note; agree with NGT, NPO, IVF; my concern as well is that this is related to #2 2. Metastatic pancreatic cancer, stage IV, to lungs and peritoneum: S/p one dose of chemotherapy. Goal of therapy is palliative. Discussed the stage of his disease and that it is not curable. If SBO is likely to be recurrent, would recommend hospice. Will consult palliative care and they and Dr. Ole Hazel can discuss with patient pending improvement or not of SBO. Thank you for this consult. All of the patient's questions were answered today.  
 
 
Signed By: Reina Carvalho MD

## 2018-07-08 NOTE — PROGRESS NOTES
Surgery      Abd films: \"The small bowel loops have decreased in caliber from  5.7 to 3.6 cm. There is thumbprinting of some of the jejunal loops. On the  upright view, however, there are still dynamic air-fluid levels. There is no  free intraperitoneal air. \"    NG output down  Ostomy some function    PE stable    Cont present RX  Films in aM    Dr Tori Herrera will assume surgical management in AM      [facility].  Tori Herrera MD, [facility]

## 2018-07-08 NOTE — PROGRESS NOTES
Spiritual Care Assessment/Progress Note  [facility]      NAME: Cory Kline      MRN: [id number]  AGE: 80 y.o. SEX: male  [employer]ian Affiliation: Mandaen   Language: English     7/8/2018     Total Time (in minutes): 8     Spiritual Assessment begun in [facility] GENERAL SURGERY through conversation with:         [x]Patient        [] Family    [] Friend(s)        Reason for Consult: Palliative Care, Initial/Spiritual Assessment     Spiritual beliefs: (Please include comment if needed)     [] Identifies with a grady tradition:         [] Supported by a grady community:            [] Claims no spiritual orientation:           [] Seeking spiritual identity:                [] Adheres to an individual form of spirituality:           [x] Not able to assess:                           Identified resources for coping:      [] Prayer                               [] Music                  [] Guided Imagery     [] Family/friends                 [] Pet visits     [] Devotional reading                         [x] Unknown     [] Other:                                             Interventions offered during this visit: (See comments for more details)    Patient Interventions: Initial visit           Plan of Care:     [x] Support spiritual and/or cultural needs    [] Support AMD and/or advance care planning process      [] Support grieving process   [] Coordinate Rites and/or Rituals    [] Coordination with community clergy   [] No spiritual needs identified at this time   [] Detailed Plan of Care below (See Comments)  [] Make referral to Music Therapy  [] Make referral to Pet Therapy     [] Make referral to Addiction services  [] Make referral to [facility]  [] Make referral to Spiritual Care Partner  [] No future visits requested        [x] Follow up visits as needed     Comments:   Attempted visit on Gen Surg for initial spiritual assessment. No visitors present.   Patient was sleeping comfortably and did not stir when I knocked on his door and called his name. Left a note for patient.   SILAS Guzman, [facility], [address] [place] [address] [employer] [facility] Paging Service  [phone])

## 2018-07-09 NOTE — PROGRESS NOTES
Consult received. Chart reviewed. D/w Pop Schultz, oncology NP. Time and input appreciated. It was decided for our team to defer until after oncology team has opportunity to have f/u visit w/ pt. We will likely have our initial visit w/ pt tomorrow. Full, initial consult note to follow. Should you have questions/concerns or the need for a bedside visit by our team sooner, please do not hesitate to contact us at [phone] Thank you for providing us w/ the opportunity to be involved in this patient's care.       Kianna Cooley MD  Palliative Care Team

## 2018-07-09 NOTE — CDMP QUERY
Account Number: [de-identified]  MRN: [id number]  Patient: Jordyn Florian  Created: [id number]  Clinician Name: Rodri Main, [address] :  Please clarify if this patient is (was) being treated/managed for:     => Hypokalemia, resolved, as evidenced by K 3.1--3.7 req ivfs + kcl, IV kcl x2  => Other explanation of clinical findings  => Clinically Undetermined (no explanation for clinical findings)    The medical record reflects the following clinical findings, treatment, and risk factors. Risk Factors:  89m adm w/ SBO  Clinical Indicators:  k 3.1--3.7  Treatment: ivfs + kcl, IV kcl x2    Please clarify and document your clinical opinion in the progress notes and discharge summary including the definitive and/or presumptive diagnosis, (suspected or probable), related to the above clinical findings. Please include clinical findings supporting your diagnosis.   Thank Eleanor Clifton RN/CDMP

## 2018-07-09 NOTE — CONSULTS
[address] [facility] [place] at [address], [facility], [address] 
[phone] Reason for Visit:  
Gina Antoine is a 80 y.o. male who is seen in consultation at the request of Dr. Eric Cazares for evaluation of metastatic pancreatic cancer. Treatment History: 1. 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  
 one dose of gem/abraxane 6/27/18 History of Present Illness:  
Chemotherapy held on 7/5/18 due to vomiting and pt not feeling well. Presented to ED on 7/6/18 with nausea and vomiting and diarrhea for 2 days. CT scan shows SBO. NGT in place. SBO appears to be at stoma site. Per Dr. Deepak Childers, plan to give gastrografin via NGT tomorrow and repeat films. If contrast does not reach the colon he may need surgery. Mr. Escobar Has says he feels better today and is anxious to find out if he will need surgery. He was alone with no family present. He did ask about continued chemotherapy. We will discuss once we find out the need for surgery tomorrow. Past Medical History:  
Diagnosis Date  Arthritis  ECG abnormality 4/30/2018  GERD (gastroesophageal reflux disease)  High cholesterol  Hypertension  Other ill-defined conditions(309.89) ~2000  
 arm tingling to ER, per pt states was not a stroke, but has been on plavix and aggrenox since. currently on plavix  Sleep apnea with use of continuous positive airway pressure (CPAP) Past Surgical History:  
Procedure Laterality Date  HX CATARACT REMOVAL Bilateral 2008 and 2012  HX HERNIA REPAIR Left 04/28/2018 Reduced & repaired [facility] w/sigmoid resection & end colostomy  HX ORTHOPAEDIC Right   
 elbow [place] ARTHROPLASTY Left 1999  TOTAL KNEE ARTHROPLASTY Right 2011 Social History Substance Use Topics  Smoking status: Never Smoker  Smokeless tobacco: Never Used  Alcohol use No  
 History reviewed. No pertinent family history. Current Facility-Administered Medications Medication Dose Route Frequency  diatrizoate meglumine-d.sodium (MD-GASTRONISHIGASTROGRAFIN) 66-10 % contrast solution 120 mL  120 mL Oral ONCE  
 sodium chloride (NS) flush 5-10 mL  5-10 mL IntraVENous Q8H  
 sodium chloride (NS) flush 5-10 mL  5-10 mL IntraVENous PRN  
 HYDROmorphone (PF) (DILAUDID) injection 0.5 mg  0.5 mg IntraVENous Q4H PRN  
 ondansetron (ZOFRAN) injection 4 mg  4 mg IntraVENous Q4H PRN  
 LORazepam (ATIVAN) injection 0.26 mg  0.26 mg IntraVENous Q6H PRN  
 0.9% sodium chloride with KCl 40 mEq/L infusion   IntraVENous CONTINUOUS  
 pantoprazole (PROTONIX) 40 mg in sodium chloride 0.9% 10 mL injection  40 mg IntraVENous DAILY  piperacillin-tazobactam (ZOSYN) 3.375 g in 0.9% sodium chloride (MBP/ADV) 100 mL  3.375 g IntraVENous Q8H Allergies Allergen Reactions  Aleve [Naproxen Sodium] Swelling Throat swelling Review of Systems: A complete review of systems was obtained, negative except as described above. Physical Exam:  
 
Visit Vitals  /77 (BP 1 Location: Left arm, BP Patient Position: Sitting)  Pulse 86  Temp 97.6 °F (36.4 °C)  Resp 18  Ht 6' (1.829 m)  Wt 185 lb 6.5 oz (84.1 kg)  SpO2 98%  BMI 25.15 kg/m2 ECOG PS: 0 General: No distress, lethargic Eyes: PERRLA, anicteric sclerae HENT: Atraumatic with normal appearance of ears and nose; OP clear; NGT in place Neck: Supple; no thyromegaly Respiratory: CTAB, normal respiratory effort CV: Normal rate, regular rhythm, no murmurs, no peripheral edema GI: Soft, nontender, nondistended, no masses, no hepatomegaly, no splenomegaly; decreased BS 
MS: Normal gait and station. Digits without clubbing or cyanosis. Skin: No rashes, ecchymoses, or petechiae. Normal temperature, turgor, and texture. Neuro/Psych: lethargic Results:  
 
Lab Results Component Value Date/Time WBC 8.2 07/08/2018 05:10 AM  
 HGB 10.8 (L) 07/08/2018 05:10 AM  
 HCT 31.6 (L) 07/08/2018 05:10 AM  
 PLATELET 665 90/84/4812 05:10 AM  
 .6 (H) 07/08/2018 05:10 AM  
 ABS. NEUTROPHILS 1.4 (L) 07/06/2018 04:29 PM  
 
Lab Results Component Value Date/Time Sodium 141 07/09/2018 03:19 AM  
 Potassium 3.7 07/09/2018 03:19 AM  
 Chloride 108 07/09/2018 03:19 AM  
 CO2 23 07/09/2018 03:19 AM  
 Glucose 90 07/09/2018 03:19 AM  
 BUN 27 (H) 07/09/2018 03:19 AM  
 Creatinine 1.00 07/09/2018 03:19 AM  
 GFR est AA >60 07/09/2018 03:19 AM  
 GFR est non-AA >60 07/09/2018 03:19 AM  
 Calcium 7.8 (L) 07/09/2018 03:19 AM  
 Glucose (POC) 117 (H) 11/30/2009 04:31 PM  
 
Lab Results Component Value Date/Time Bilirubin, total 1.2 (H) 07/07/2018 07:06 AM  
 ALT (SGPT) 27 07/07/2018 07:06 AM  
 AST (SGOT) 21 07/07/2018 07:06 AM  
 Alk. phosphatase 61 07/07/2018 07:06 AM  
 Protein, total 5.6 (L) 07/07/2018 07:06 AM  
 Albumin 2.5 (L) 07/07/2018 07:06 AM  
 Globulin 3.1 07/07/2018 07:06 AM  
 
7/7/18 CT a/p FINDINGS:  
LUNG BASES: Numerous nodules and masses. Minimal bilateral effusions. 
  
INCIDENTALLY IMAGED HEART AND MEDIASTINUM: Trace pericardial fluid. Esophageal 
wall thickening and hyperemia. LIVER: No mass or biliary dilatation. GALLBLADDER: Cholelithiasis without inflammatory change. SPLEEN: No mass. PANCREAS: Heterogeneously cystic and solid lesion in the tail and body, 
measuring 6.7 x 4.8 x 4.6 cm, containing calcification without obvious 
associated ductal dilatation. Pancreatic tail atrophy. 
  
ADRENALS: Unremarkable. KIDNEYS: No mass, calculus, or hydronephrosis. STOMACH: Distended with fluid. SMALL BOWEL: Increased distention of small bowel loops with decompressed distal 
small bowel loop. Decompressed colon as well. Transition point related to a 
spigelian hernia on the left. COLON: There is a left inguinal hernia as well this is not the current source of 
obstruction.  Right inguinal hernia contains fluid. . Numerous diverticula in the 
sigmoid colon. APPENDIX: Not well seen. PERITONEUM: No pneumoperitoneum. Ascites. RETROPERITONEUM: No lymphadenopathy or aortic aneurysm. Atherosclerotic change. REPRODUCTIVE ORGANS: Enlarged prostate. URINARY BLADDER: No mass or calculus. BONES: No destructive bone lesion. ADDITIONAL COMMENTS:  
IMPRESSION IMPRESSION: 
  
Small bowel obstruction related to spigelian hernia on the left. Left inguinal 
hernia contains large bowel and does not cause obstruction. Smaller right 
inguinal hernia containing fluid. Cystic and solid mass of the pancreas not significantly changed. Multiple pulmonary nodules and small pleural effusions. Assessment and Recommendations: 1. Partial SBO: new;  Reviewed Dr. Georges Bound note; agree with NGT, NPO, IVF; my concern as well is that this is related to #2 . Repeat xray with gastroview is planned for tomorrow. This will determine if surgery is necessary. 2. Metastatic pancreatic cancer, stage IV, to lungs and peritoneum: S/p one dose of chemotherapy. Goal of therapy is palliative. If SBO is likely to be recurrent, would recommend hospice. We will wait on xray tomorrow to see if he will need surgery and the opinion for Dr. Tom Arroyo. Signed By: Katrina Youngblood NP Attending Physician Note:  
 
I have reviewed the history, physical examination, assessment and the plan of the care of the patient. I saw the patient with Whitley Puri and I participated in the examination and critical decision making. I agree with the note as stated above. Mr. Argelia Ford is a gentleman with metastatic pancreatic adenocarcinoma. He is admitted with small bowel obstruction. He still maintains a fair performance status. He is not ready to stop systemic therapy yet. The bowel obstruction is not related to systemic therapy but a consequence of peritoneal disease.  We will continue to follow up the developments in the [facility]. 
 
 
Signed by: Josephine Angeles MD 
                   July 9, 2018

## 2018-07-09 NOTE — INTERDISCIPLINARY ROUNDS
Interdisciplinary Rounds were completed on this patient. Rounds included nursing, clinical care leader, pharmacy, and case management.      Plan for the day: ngt, iv abx  Plan for the stay: continue current TX  Activity: up in chair / PT/OT  Anticipated discharge date:

## 2018-07-09 NOTE — ROUTINE PROCESS
Bedside, Verbal and Written shift change report given to Sue Field RN (oncoming nurse) by Sly Glasgow RN (offgoing nurse). Report included the following information SBAR, Kardex, MAR and Recent Results.

## 2018-07-09 NOTE — PROGRESS NOTES
Problem: Falls - Risk of  Goal: *Absence of Falls  Document Liu Fall Risk and appropriate interventions in the flowsheet. Fall Risk Interventions:  Mobility Interventions: Bed/chair exit alarm, Patient to call before getting OOB    Mentation Interventions: Bed/chair exit alarm, Door open when patient unattended    Medication Interventions: Bed/chair exit alarm, Patient to call before getting OOB, Teach patient to arise slowly    Elimination Interventions: Call light in reach, Patient to call for help with toileting needs             Problem: Pressure Injury - Risk of  Goal: *Prevention of pressure injury  Document Trevor Scale and appropriate interventions in the flowsheet.    Pressure Injury Interventions:  Sensory Interventions: Float heels, Keep linens dry and wrinkle-free, Minimize linen layers, Monitor skin under medical devices    Moisture Interventions: Absorbent underpads, Maintain skin hydration (lotion/cream), Minimize layers    Activity Interventions: Increase time out of bed    Mobility Interventions: Float heels, HOB 30 degrees or less    Nutrition Interventions: Document food/fluid/supplement intake    Friction and Shear Interventions: HOB 30 degrees or less

## 2018-07-09 NOTE — PROGRESS NOTES
Admit Date: 2018    POD * No surgery found *    Procedure:  * No surgery found *    Subjective:     Patient has no complaints. Has some soft stool in ostomy bag. No N/V. Not much NG o/p.    Objective:     Blood pressure 159/86, pulse 73, temperature 97.6 °F (36.4 °C), resp. rate 18, height 6' (1.829 m), weight 185 lb 6.5 oz (84.1 kg), SpO2 97 %. Temp (24hrs), Av °F (36.7 °C), Min:97.4 °F (36.3 °C), Max:98.4 °F (36.9 °C)      Physical Exam:  GENERAL: alert, cooperative, no distress, appears stated age, LUNG: clear to auscultation bilaterally, HEART: regular rate and rhythm, ABDOMEN: soft, non-tender. Bowel sounds normal. No masses,  no organomegaly, no palpable parastomal hernia or tenderness at ostomy site, EXTREMITIES:  extremities normal, atraumatic, no cyanosis or edema    Labs:   Recent Results (from the past 24 hour(s))   METABOLIC PANEL, BASIC    Collection Time: 18  3:19 AM   Result Value Ref Range    Sodium 141 136 - 145 mmol/L    Potassium 3.7 3.5 - 5.1 mmol/L    Chloride 108 97 - 108 mmol/L    CO2 23 21 - 32 mmol/L    Anion gap 10 5 - 15 mmol/L    Glucose 90 65 - 100 mg/dL    BUN 27 (H) 6 - 20 MG/DL    Creatinine 1.00 0.70 - 1.30 MG/DL    BUN/Creatinine ratio 27 (H) 12 - 20      GFR est AA >60 >60 ml/min/1.73m2    GFR est non-AA >60 >60 ml/min/1.73m2    Calcium 7.8 (L) 8.5 - 10.1 MG/DL       Data Review images and reports reviewed    Assessment:     Active Problems:    SBO (small bowel obstruction) ([address] (2018)      Metastasis from pancreatic cancer (HCC) (2018)        Plan/Recommendations/Medical Decision Making:     Pt with SBO, appears related to small bowel at stoma site. Clinically looks better but films not improved today. Will give gastrografin via NGT and clamp 4 hours, repeat films in am.  If contrast does not reach colon, will need surgery. Juan M Escobar.  Tom Arroyo MD, [facility]

## 2018-07-09 NOTE — PROGRESS NOTES
Pt is an 80 y.o.  male, admitted for metastasis from pancreatic cancer. Pt was alert and oriented, upon CM room visit. Pt reported that he resides in his 2 story home (lives on first floor-3 steps into main entrance), with his son. Pt is known to be independent with ADLs, and does not drive. Pt reported that he is active with his PCP: Dr. Eleuterio Da Silva at the [address] [place] [address] reported no DME: walker, cane and CPAP. Pt reported that he was at [facility] in the past.  Pt reported that he has an personal care aide from:  [employer] (M-F, 8hrs a day). Pt may require additional assistance at d/c. CM will leave MD an FYI regarding pt needing to work with therapy, to determine pt's baseline at d/c.    Reason for Admission:  pancreatic cancer. RRAT Score:    29              Resources/supports as identified by patient/family:   Pt listed his son as a resource                Top Challenges facing patient (as identified by patient/family and CM): Finances/Medication cost? Pt has two insurance policies that will assist with the coverage of medical expenses. Transportation? Pt has transportation provided by family. Support system or lack thereof? Pt's son and personal care companion are listed as support systems                     Living arrangements? Pt lives with his son               Self-care/ADLs/Cognition? Independent with ADls, pt does not not drive          Current Advanced Directive/Advance Care Plan:  FULL                          Plan for utilizing home health:  Possible home health or SNF needed at d/c. Likelihood of readmission: HIGH                 Transition of Care Plan:  Pt will need to be evaluated by therapy to determine pt's baseline for additional recommendations. Care Management Interventions  PCP Verified by CM:  Yes  Palliative Care Criteria Met (RRAT>21 & CHF Dx)?: Yes  Palliative Consult Recommended?: Yes  Mode of Transport at Discharge:  Other (see comment)  Transition of Care Consult (CM Consult): Discharge Planning  Discharge Durable Medical Equipment: No  Physical Therapy Consult: No  Occupational Therapy Consult: No  Speech Therapy Consult: No  Current Support Network: Relative's Home, Other  Confirm Follow Up Transport: Family  Plan discussed with Pt/Family/Caregiver: Yes  Discharge Location  Discharge Placement: [facility], MSW   [phone]

## 2018-07-10 NOTE — PROGRESS NOTES
Spiritual Care Assessment/Progress Note  [facility]      NAME: Larina Saint      MRN: [id number]  AGE: 80 y.o.  SEX: male  Baptism Affiliation: Religion   Language: English     7/10/2018     Total Time (in minutes): 12     Spiritual Assessment begun in [facility] GENERAL SURGERY through conversation with:         [x]Patient        [] Family    [x] Friend(s)        Reason for Consult: Palliative Care, Initial/Spiritual Assessment     Spiritual beliefs: (Please include comment if needed)     [x] Identifies with a grady tradition:         [x] Supported by a grady community:            [] Claims no spiritual orientation:           [] Seeking spiritual identity:                [] Adheres to an individual form of spirituality:           [] Not able to assess:                           Identified resources for coping:      [x] Prayer                               [] Music                  [] Guided Imagery     [x] Family/friends                 [] Pet visits     [] Devotional reading                         [] Unknown     [x] Other: Communion                                              Interventions offered during this visit: (See comments for more details)    Patient Interventions: Coping skills reviewed/reinforced, Initial/Spiritual assessment, patient floor, Affirmation of emotions/emotional suffering, Baptism beliefs/image of God discussed, Iconic (affirming the presence of God/Higher Power)           Plan of Care:     [] Support spiritual and/or cultural needs    [] Support AMD and/or advance care planning process      [] Support grieving process   [] Coordinate Rites and/or Rituals    [] Coordination with community clergy   [] No spiritual needs identified at this time   [] Detailed Plan of Care below (See Comments)  [] Make referral to Music Therapy  [] Make referral to Pet Therapy     [] Make referral to Addiction services  [] Make referral to [facility]  [] Make referral to Spiritual Care Partner  [] No future visits requested        [x] Follow up visits as needed   Initial Spiritual Assessment in 2132 with Palliative pt. Pt accompanied by Jaya Goodman who is a [facility]  and also a [facility] member who worships with pt at [facility]. 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 Pt provided details of events leading to hospitalization and associated feelings. Pt doing better and may be going home tomorrow. Extended blessing and celebrated progress with pt. Stepped away to allow for Jaya Goodman and pt who have known each other for over 40 years to continue conversing. Pt expressed appreciation for visit. No further needs identified at this time. NICKY Koroma

## 2018-07-10 NOTE — PROGRESS NOTES
Problem: Mobility Impaired (Adult and Pediatric)  Goal: *Acute Goals and Plan of Care (Insert Text)  Physical Therapy Goals  Initiated 7/10/2018  1. Patient will move from supine to sit and sit to supine , scoot up and down and roll side to side in bed with independence within 7 day(s). 2.  Patient will transfer from bed to chair and chair to bed with modified independence using the least restrictive device within 7 day(s). 3.  Patient will perform sit to stand with independence within 7 day(s). 4.  Patient will ambulate with modified independence for 250 feet with the least restrictive device within 7 day(s). 5.  Patient will ascend/descend 3 stairs with 1 handrail(s) with supervision/set-up within 7 day(s). physical Therapy EVALUATION  Patient: Pedrito Oneill (53 y.o. male)  Date: 7/10/2018  Primary Diagnosis: SBO (small bowel obstruction) (HCC)  Metastasis from pancreatic cancer [facility])        Precautions:   Fall    ASSESSMENT :  Based on the objective data described below, the patient presents with decreased mobility, endurance, and decreased strength. Discussed with nursing and cleared to mobilize. Pt received supine in bed. He transferred supine to sit and sit to stand with CGA. Pt reports that he normally walks with SPC. He ambulated 150 feet with RW, CGA, no LOB noted. However, noted pt walking at an accelerated pace. Pt returned to room and transferred stand to sit in chair with CGA. Discussed benefits of mobilizing and encouraged pt to continue mobilizing throughout hospitalization. Pt reports his granddaughter is an RN, who just started working at [employer]. Ended session with pt sitting up in chair with a friend visiting. Noted pt to have palliative meeting. Will continue to follow and adjust goals as needed. Patient will benefit from skilled intervention to address the above impairments.   Patients rehabilitation potential is considered to be Good  Factors which may influence rehabilitation potential include:   []         None noted  []         Mental ability/status  [x]         Medical condition  []         Home/family situation and support systems  []         Safety awareness  []         Pain tolerance/management  []         Other:      PLAN :  Recommendations and Planned Interventions:  [x]           Bed Mobility Training             []    Neuromuscular Re-Education  [x]           Transfer Training                   []    Orthotic/Prosthetic Training  [x]           Gait Training                         []    Modalities  [x]           Therapeutic Exercises           []    Edema Management/Control  [x]           Therapeutic Activities            [x]    Patient and Family Training/Education  []           Other (comment):    Frequency/Duration: Patient will be followed by physical therapy  3 times a week to address goals. Discharge Recommendations: Home Health  Further Equipment Recommendations for Discharge: none, pt states he has RW at home     SUBJECTIVE:   Patient stated Brennan Blair told me I was getting an X-ray and getting this thing out of my nose.     OBJECTIVE DATA SUMMARY:   HISTORY:    Past Medical History:   Diagnosis Date    Arthritis     ECG abnormality 4/30/2018    GERD (gastroesophageal reflux disease)     High cholesterol     Hypertension     Other ill-defined conditions(799.89) ~2000    arm tingling to ER, per pt states was not a stroke, but has been on plavix and aggrenox since. currently on plavix    Sleep apnea with use of continuous positive airway pressure (CPAP)      Past Surgical History:   Procedure Laterality Date    HX CATARACT REMOVAL Bilateral 2008 and 2012    HX HERNIA REPAIR Left 04/28/2018    Reduced & repaired [facility] w/sigmoid resection & end colostomy    HX ORTHOPAEDIC Right     elbow     TOTAL KNEE ARTHROPLASTY Left 1999    TOTAL KNEE ARTHROPLASTY Right 2011     Prior Level of Function/Home Situation: Pt lives at home with his son.  He stated his house is handicap accessible. He normally ambulates with SPC, sometimes even without AD. Pt reports having a cane and RW at home. He is independent in all ADLs. Unsure if pt is alone at times at home, when son has to work. Granddaughter is an RN starting to work at [employer]. Pt went to [facility] and was using a SPC, but has a RW if needed. Personal factors and/or comorbidities impacting plan of care: pancreatic CA    Home Situation  Home Environment: Private residence  # Steps to Enter: 3  Rails to Enter: Yes  One/Two Story Residence: Two story  # of Interior Steps: 9  Living Alone: No  Support Systems: Family member(s)  Patient Expects to be Discharged to[de-identified] Private residence  Current DME Used/Available at Home: [address] [facility], [place], straight, Walker, rolling, Grab bars  Tub or Shower Type: Shower    EXAMINATION/PRESENTATION/DECISION MAKING:   Critical Behavior:  Neurologic State: Alert  Orientation Level: Oriented to place, Oriented to person, Oriented to situation  Cognition: Follows commands, Decreased attention/concentration  Safety/Judgement: Awareness of environment, Fall prevention, Decreased awareness of need for safety  Hearing:   Auditory  Auditory Impairment: None  Skin:  intact  Edema: none  Range Of Motion:  AROM: Generally decreased, functional           PROM: Within functional limits           Strength:    Strength: Generally decreased, functional                    Tone & Sensation:   Tone: Normal              Sensation: Intact               Coordination:  Coordination: Within functional limits  Vision:   Acuity: Within Defined Limits  Functional Mobility:  Bed Mobility:     Supine to Sit: Contact guard assistance        Transfers:  Sit to Stand: Contact guard assistance  Stand to Sit: Contact guard assistance                       Balance:   Sitting: Intact  Standing: Impaired  Standing - Static: Good  Standing - Dynamic : Fair  Ambulation/Gait Training:  Distance (ft): 150 Feet (ft)  Assistive Device: Walker, rolling;Gait belt  Ambulation - Level of Assistance: Contact guard assistance        Gait Abnormalities: Shuffling gait                 Functional Measure:  Tinetti test:    Sitting Balance: 1  Arises: 1  Attempts to Rise: 2  Immediate Standing Balance: 1  Standing Balance: 1  Nudged: 1  Eyes Closed: 0  Turn 360 Degrees - Continuous/Discontinuous: 0  Turn 360 Degrees - Steady/Unsteady: 1  Sitting Down: 2  Balance Score: 10  Indication of Gait: 1  R Step Length/Height: 1  L Step Length/Height: 1  R Foot Clearance: 1  L Foot Clearance: 1  Step Symmetry: 1  Step Continuity: 1  Path: 1  Trunk: 2  Walking Time: 1  Gait Score: 11  Total Score: 21       Tinetti Test and G-code impairment scale:  Percentage of Impairment CH    0%   CI    1-19% CJ    20-39% CK    40-59% CL    60-79% CM    80-99% CN     100%   Tinetti  Score 0-28 28 23-27 17-22 12-16 6-11 1-5 0       Tinetti Tool Score Risk of Falls  <19 = High Fall Risk  19-24 = Moderate Fall Risk  25-28 = Low Fall Risk  Tinetti ME. Performance-Oriented Assessment of Mobility Problems in Elderly Patients. [address]; [id number]. (Scoring Description: PT Bulletin Feb. 10, 1993)    Older adults: La Nena Cabrera et al, 2009; n = 1000 [facility] elderly evaluated with ABC, ARABELLA, ADL, and IADL)  · Mean ARABELLA score for males aged 69-68 years = 26.21(3.40)  · Mean ARABELLA score for females age 69-68 years = 25.16(4.30)  · Mean ARABELLA score for males over 80 years = 23.29(6.02)  · Mean ARABELLA score for females over 80 years = 17.20(8.32)         G codes: In compliance with CMSs Claims Based Outcome Reporting, the following G-code set was chosen for this patient based on their primary functional limitation being treated: The outcome measure chosen to determine the severity of the functional limitation was the Tinetti with a score of 21/28 which was correlated with the impairment scale.     ? Mobility - Walking and Moving Around:     - CURRENT STATUS: CJ - 20%-39% impaired, limited or restricted    - GOAL STATUS: CI - 1%-19% impaired, limited or restricted    - D/C STATUS:  ---------------To be determined---------------      Physical Therapy Evaluation Charge Determination   History Examination Presentation Decision-Making   MEDIUM  Complexity : 1-2 comorbidities / personal factors will impact the outcome/ POC  MEDIUM Complexity : 3 Standardized tests and measures addressing body structure, function, activity limitation and / or participation in recreation  LOW Complexity : Stable, uncomplicated  Other outcome measures Tinetti  MEDIUM      Based on the above components, the patient evaluation is determined to be of the following complexity level: LOW     Pain:  Pain Scale 1: Numeric (0 - 10)  Pain Intensity 1: 0              Activity Tolerance:   WFL  Please refer to the flowsheet for vital signs taken during this treatment. After treatment:   [x]         Patient left in no apparent distress sitting up in chair  []         Patient left in no apparent distress in bed  [x]         Call bell left within reach  [x]         Nursing notified  []         Caregiver present  []         Bed alarm activated    COMMUNICATION/EDUCATION:   The patients plan of care was discussed with: Occupational Therapist and Registered Nurse. [x]         Fall prevention education was provided and the patient/caregiver indicated understanding. [x]         Patient/family have participated as able in goal setting and plan of care. [x]         Patient/family agree to work toward stated goals and plan of care. []         Patient understands intent and goals of therapy, but is neutral about his/her participation. []         Patient is unable to participate in goal setting and plan of care. Thank you for this referral.  Starr Kelly , SPT   Time Calculation: 30 mins    Regarding student involvement in patient care:  A student participated in this treatment session.  Per CMS Medicare statements and APTA guidelines I certify that the following was true:  1. I was present and directly observed the entire session. 2. I made all skilled judgments and clinical decisions regarding care. 3. I am the practitioner responsible for assessment, treatment, and documentation.

## 2018-07-10 NOTE — PROGRESS NOTES
Chart reviewed. Unfortunately, we were not able to visit w/ pt. We hope to be able to visit ravinder. Full, initial consult note still to follow. Should you have questions/concerns or the need for a bedside visit by our team sooner, please do not hesitate to contact us at [phone]). Thank you for providing us w/ the opportunity to be involved in this patient's care.       Darien Lobo MD  Palliative Care Team

## 2018-07-10 NOTE — INTERDISCIPLINARY ROUNDS
Interdisciplinary Rounds were completed on this patient. Rounds included nursing, clinical care leader, pharmacy, and case management.      Plan for the day xray, NGT, ambulating in [place]   Pan for the stay: continue current TX  Activity: up in chair   Anticipated discharge date:TBD

## 2018-07-10 NOTE — PROGRESS NOTES
Problem: Self Care Deficits Care Plan (Adult)  Goal: *Acute Goals and Plan of Care (Insert Text)  Occupational Therapy Goals  Initiated 7/10/2018  1. Patient will perform grooming with supervision standing at sink >3 minutes within 7 day(s). 2.  Patient will perform lower body dressing with modified independence within 7 day(s). 3.  Patient will perform toilet transfers with modified independence using least restrictive AD within 7 day(s). 4.  Patient will perform all aspects of toileting with modified independence within 7 day(s). 5.  Patient will participate in upper extremity therapeutic exercise/activities with independence for 5 minutes within 7 day(s). 6.  Patient will utilize energy conservation techniques during functional activities with verbal cues within 7 day(s). Occupational Therapy EVALUATION  Patient: Tanika Bauer (86 y.o. male)  Date: 7/10/2018  Primary Diagnosis: SBO (small bowel obstruction) (HCC)  Metastasis from pancreatic cancer [facility])        Precautions:   Fall    ASSESSMENT :  Cleared by RN to see pt for therapy session. Based on the objective data described below, the patient presents with decreased balance, endurance and strength and impulsivity with transfers following admission for SBO. Pt lives with his son, previously I with ADLs and using SPC for mobility. Today pt received supine in bed, agreeable to participate. Performed bed mobility with CGA, good sitting balance EOB and pt using crossover method to don socks with supervision. Stood to [place] with CGA and ambulated to bathroom to performed toilet transfer with CGA and verbal/tactile cueing for technique. CGA for brief standing grooming ADL due to mildly decreased balance. Pt with tendency to ambulate at accelerated pace, cueing needed to slow pace for safety. Transferred to chair at end of session with cues for hand placement during transfer, alarm set and call bell in reach.  Patient will benefit from skilled intervention to address the above impairments as he is below his functional baseline. Also note that pt with palliative consult pending. Will continue to follow and adjust POC as appropriate. Recommend HHOT at discharge. Patients rehabilitation potential is considered to be Good  Factors which may influence rehabilitation potential include:   [x]             None noted  []             Mental ability/status  []             Medical condition  []             Home/family situation and support systems  []             Safety awareness  []             Pain tolerance/management  []             Other:      PLAN :  Recommendations and Planned Interventions:  [x]               Self Care Training                  [x]        Therapeutic Activities  [x]               Functional Mobility Training    []        Cognitive Retraining  [x]               Therapeutic Exercises           [x]        Endurance Activities  [x]               Balance Training                   []        Neuromuscular Re-Education  []               Visual/Perceptual Training     [x]   Home Safety Training  [x]               Patient Education                 [x]        Family Training/Education  []               Other (comment):    Frequency/Duration: Patient will be followed by occupational therapy 3 times a week to address goals. Discharge Recommendations: Home Health  Further Equipment Recommendations for Discharge: none at this time for OT     SUBJECTIVE:   Patient stated At home I use a cane.     OBJECTIVE DATA SUMMARY:   HISTORY:   Past Medical History:   Diagnosis Date    Arthritis     ECG abnormality 4/30/2018    GERD (gastroesophageal reflux disease)     High cholesterol     Hypertension     Other ill-defined conditions(799.89) ~2000    arm tingling to ER, per pt states was not a stroke, but has been on plavix and aggrenox since.   currently on plavix    Sleep apnea with use of continuous positive airway pressure (CPAP)      Past Surgical History:   Procedure Laterality Date    HX CATARACT REMOVAL Bilateral 2008 and 2012    HX HERNIA REPAIR Left 04/28/2018    Reduced & repaired [facility] w/sigmoid resection & end colostomy    HX ORTHOPAEDIC Right     elbow     TOTAL KNEE ARTHROPLASTY Left 1999    TOTAL KNEE ARTHROPLASTY Right 2011       Prior Level of Function/Environment/Context: Pt lives with his son, previously I with ADLs and using SPC for mobility. Home Situation  Home Environment: Private residence  # Steps to Enter: 3  Rails to Enter: Yes  One/Two Story Residence: Two story  # of Interior Steps: 9  Living Alone: No  Support Systems: Family member(s)  Patient Expects to be Discharged to[de-identified] Private residence  Current DME Used/Available at Home: [place], straight, Walker, rolling, Grab bars  Tub or Shower Type: Shower    Hand dominance: Right    EXAMINATION OF PERFORMANCE DEFICITS:  Cognitive/Behavioral Status:  Neurologic State: Alert  Orientation Level: Oriented to place;Oriented to person;Oriented to situation  Cognition: Follows commands;Decreased attention/concentration  Perception: Appears intact  Perseveration: No perseveration noted  Safety/Judgement: Awareness of environment; Fall prevention;Decreased awareness of need for safety    Hearing: Auditory  Auditory Impairment: None    Vision/Perceptual:         Acuity: Within Defined Limits         Range of Motion:  AROM: Generally decreased, functional  PROM: Within functional limits    Strength:  Strength: Generally decreased, functional    Coordination:  Coordination: Within functional limits  Fine Motor Skills-Upper: Left Intact; Right Intact    Gross Motor Skills-Upper: Left Intact; Right Intact    Tone & Sensation:  Tone: Normal  Sensation: Intact       Balance:  Sitting: Intact  Standing: Impaired  Standing - Static: Good  Standing - Dynamic : Fair    Functional Mobility and Transfers for ADLs:  Bed Mobility:  Supine to Sit: Contact guard assistance    Transfers:  Sit to Stand: Contact guard assistance  Stand to Sit: Contact guard assistance  Toilet Transfer : Contact guard assistance    ADL Assessment:  Feeding: Setup    Oral Facial Hygiene/Grooming: Setup    Bathing: Minimum assistance    Upper Body Dressing: Setup    Lower Body Dressing: Minimum assistance    Toileting: Supervision        ADL Intervention and task modifications:       Grooming  Washing Hands: Contact guard assistance (standing at sink wiht RW)      Lower Body Dressing Assistance  Socks: Supervision/set-up  Leg Crossed Method Used: Yes  Position Performed: Seated edge of bed  Cues: Verbal cues provided    Toileting  Bladder Hygiene: Supervision/set-up    Cognitive Retraining  Safety/Judgement: Awareness of environment; Fall prevention;Decreased awareness of need for safety    Functional Measure:  Barthel Index:    Bathin  Bladder: 10  Bowels: 10  Groomin  Dressin  Feeding: 10  Mobility: 10  Stairs: 5  Toilet Use: 5  Transfer (Bed to Chair and Back): 10  Total: 75       Barthel and G-code impairment scale:  Percentage of impairment CH  0% CI  1-19% CJ  20-39% CK  40-59% CL  60-79% CM  80-99% CN  100%   Barthel Score 0-100 100 99-80 79-60 59-40 20-39 1-19   0   Barthel Score 0-20 20 17-19 13-16 9-12 5-8 1-4 0      The Barthel ADL Index: Guidelines  1. The index should be used as a record of what a patient does, not as a record of what a patient could do. 2. The main aim is to establish degree of independence from any help, physical or verbal, however minor and for whatever reason. 3. The need for supervision renders the patient not independent. 4. A patient's performance should be established using the best available evidence. Asking the patient, friends/relatives and nurses are the usual sources, but direct observation and common sense are also important. However direct testing is not needed.   5. Usually the patient's performance over the preceding 24-48 hours is important, but occasionally longer periods will be relevant. 6. Middle categories imply that the patient supplies over 50 per cent of the effort. 7. Use of aids to be independent is allowed. Darryle Chant., Barthel, D.W. (7962). Functional evaluation: the Barthel Index. [address] [facility] (14)2. LEODAN Jefferson, Paul Robert., Ralf Baig., [place], [address] (1999). Measuring the change indisability after inpatient rehabilitation; comparison of the responsiveness of the Barthel Index and Functional [place] Measure. Journal of Neurology, Neurosurgery, and Psychiatry, 66(4), 724-397. Selvin Silver, N.J.A, ALEA Avendano, & Cass Blank M.A. (2004.) Assessment of post-stroke quality of life in cost-effectiveness studies: The usefulness of the Barthel Index and the EuroQoL-5D. Quality of Life Research, 13, 564-10       G codes: In compliance with CMSs Claims Based Outcome Reporting, the following G-code set was chosen for this patient based on their primary functional limitation being treated: The outcome measure chosen to determine the severity of the functional limitation was the Barthel Index with a score of 75/100 which was correlated with the impairment scale. ?  Self Care:     - CURRENT STATUS: CJ - 20%-39% impaired, limited or restricted    - GOAL STATUS: CI - 1%-19% impaired, limited or restricted    - D/C STATUS:  ---------------To be determined---------------     Occupational Therapy Evaluation Charge Determination   History Examination Decision-Making   LOW Complexity : Brief history review  LOW Complexity : 1-3 performance deficits relating to physical, cognitive , or psychosocial skils that result in activity limitations and / or participation restrictions  LOW Complexity : No comorbidities that affect functional and no verbal or physical assistance needed to complete eval tasks       Based on the above components, the patient evaluation is determined to be of the following complexity level: LOW   Pain:  Pain Scale 1: Numeric (0 - 10)  Pain Intensity 1: 0              Activity Tolerance:   Good  Please refer to the flowsheet for vital signs taken during this treatment. After treatment:   [x] Patient left in no apparent distress sitting up in chair  [] Patient left in no apparent distress in bed  [x] Call bell left within reach  [x] Nursing notified  [x] Caregiver present  [x] Chair alarm activated    COMMUNICATION/EDUCATION:   The patients plan of care was discussed with: Physical Therapist and Registered Nurse. [x] Home safety education was provided and the patient/caregiver indicated understanding. [x] Patient/family have participated as able in goal setting and plan of care. [x] Patient/family agree to work toward stated goals and plan of care. [] Patient understands intent and goals of therapy, but is neutral about his/her participation. [] Patient is unable to participate in goal setting and plan of care. This patients plan of care is appropriate for delegation to [facility].     Thank you for this referral.  Lalit Melendez OT  Time Calculation: 30 mins

## 2018-07-10 NOTE — PROGRESS NOTES
Admit Date: 2018    POD * No surgery found *    Procedure:  * No surgery found *    Subjective:     Patient has no complaints. Has large amount of ostomy output overnight. No N/V. Not much NG o/p.    Objective:     Blood pressure 168/85, pulse 72, temperature 97.3 °F (36.3 °C), resp. rate 18, height 6' (1.829 m), weight 185 lb 6.5 oz (84.1 kg), SpO2 99 %. Temp (24hrs), Av.5 °F (36.4 °C), Min:97.1 °F (36.2 °C), Max:98 °F (36.7 °C)      Physical Exam:  GENERAL: alert, cooperative, no distress, appears stated age, LUNG: clear to auscultation bilaterally, HEART: regular rate and rhythm, ABDOMEN: soft, non-tender. Bowel sounds normal. No masses,  no organomegaly, no palpable parastomal hernia or tenderness at ostomy site, EXTREMITIES:  extremities normal, atraumatic, no cyanosis or edema    Labs:   Recent Results (from the past 24 hour(s))   CBC WITH AUTOMATED DIFF    Collection Time: 07/10/18  4:46 AM   Result Value Ref Range    WBC 10.0 4.1 - 11.1 K/uL    RBC 3.25 (L) 4.10 - 5.70 M/uL    HGB 11.1 (L) 12.1 - 17.0 g/dL    HCT 33.4 (L) 36.6 - 50.3 %    .8 (H) 80.0 - 99.0 FL    MCH 34.2 (H) 26.0 - 34.0 PG    MCHC 33.2 30.0 - 36.5 g/dL    RDW 13.6 11.5 - 14.5 %    PLATELET 200 105 - 666 K/uL    MPV 9.4 8.9 - 12.9 FL    NRBC 0.3 (H) 0  WBC    ABSOLUTE NRBC 0.03 (H) 0.00 - 0.01 K/uL    NEUTROPHILS 62 32 - 75 %    BAND NEUTROPHILS 1 %    LYMPHOCYTES 28 12 - 49 %    MONOCYTES 7 5 - 13 %    EOSINOPHILS 1 0 - 7 %    BASOPHILS 1 0 - 1 %    IMMATURE GRANULOCYTES 0 0.0 - 0.5 %    ABS. NEUTROPHILS 6.3 1.8 - 8.0 K/UL    ABS. LYMPHOCYTES 2.8 0.8 - 3.5 K/UL    ABS. MONOCYTES 0.7 0.0 - 1.0 K/UL    ABS. EOSINOPHILS 0.1 0.0 - 0.4 K/UL    ABS. BASOPHILS 0.1 0.0 - 0.1 K/UL    ABS. IMM.  GRANS. 0.0 0.00 - 0.04 K/UL    DF MANUAL      RBC COMMENTS NORMOCYTIC, NORMOCHROMIC     METABOLIC PANEL, BASIC    Collection Time: 07/10/18  4:46 AM   Result Value Ref Range    Sodium 144 136 - 145 mmol/L    Potassium 3.9 3.5 - 5.1 mmol/L    Chloride 113 (H) 97 - 108 mmol/L    CO2 21 21 - 32 mmol/L    Anion gap 10 5 - 15 mmol/L    Glucose 96 65 - 100 mg/dL    BUN 22 (H) 6 - 20 MG/DL    Creatinine 0.95 0.70 - 1.30 MG/DL    BUN/Creatinine ratio 23 (H) 12 - 20      GFR est AA >60 >60 ml/min/1.73m2    GFR est non-AA >60 >60 ml/min/1.73m2    Calcium 8.1 (L) 8.5 - 10.1 MG/DL       Data Review images and reports reviewed    Assessment:     Active Problems:    SBO (small bowel obstruction) ([address] (7/6/2018)      Metastasis from pancreatic cancer ([address] (7/6/2018)        Plan/Recommendations/Medical Decision Making:     Pt with SBO, appears related to small bowel at stoma site. Clinically looks better, contrast scant remaining in colon. D/C NGT and trial of clear liquids. If he does not tolerate this, we will discuss surgical exploration. Bibiana Cespedes.  Tori Herrera MD, [facility]

## 2018-07-10 NOTE — PROGRESS NOTES
MORIAH spoke with pt's son: Alan Faulkner, via telephone: [phone]. Nay Forte had questions regarding the pt's d/c needs and plans. MORIAH informed him that pt has been working with PT/OT and they are recommending [place], and services can be set up by MORIAH. Aquiles reported that he accepts that pt will need home health care at d/c. CM will request order from MD, regarding pt's needs. MORIAH will send an referral regarding pt's needs for [place]. UPDATE: 12:48PM    MORIAH informed that pt has been accepted to [facility].   CM will update pt and son    Maria Dolores Gomez, MSW CM  [phone]

## 2018-07-10 NOTE — ROUTINE PROCESS
Bedside, Verbal and Written shift change report given to Archnaa Fletcher RN (oncoming nurse) by Godfrey Ho RN  (offgoing nurse). Report included the following information SBAR, Kardex, MAR and Recent Results.

## 2018-07-11 NOTE — PROGRESS NOTES
Pharmacist Discharge Medication Reconciliation    Significant PMH:   Past Medical History:   Diagnosis Date    Arthritis     ECG abnormality 4/30/2018    GERD (gastroesophageal reflux disease)     High cholesterol     Hypertension     Other ill-defined conditions(799.89) ~2000    arm tingling to ER, per pt states was not a stroke, but has been on plavix and aggrenox since. currently on plavix    Sleep apnea with use of continuous positive airway pressure (CPAP)      Chief Complaint for this Admission:   Chief Complaint   Patient presents with    Vomiting     Pt states he ate out on Wednesday night and started with vomiting and diarrhea nonstop since. Allergies: Aleve [naproxen sodium]    Discharge Medications:   Current Discharge Medication List        CONTINUE these medications which have NOT CHANGED    Details   metoclopramide HCl (REGLAN) 5 mg tablet Take 5 mg by mouth four (4) times daily as needed for Nausea. Omeprazole delayed release (PRILOSEC D/R) 20 mg tablet Take 20 mg by mouth daily. loperamide (IMMODIUM) 2 mg tablet Take 2 mg by mouth daily as needed for Diarrhea.      acetaminophen (TYLENOL EXTRA STRENGTH) 500 mg tablet Take 500 mg by mouth daily as needed for Pain. calcium carbonate (TUMS) 200 mg calcium (500 mg) chew Take 1 Tab by mouth four (4) times daily as needed ('Heart Burn/Acid Reflux'). lidocaine-prilocaine (EMLA) topical cream Apply  to affected area as needed for Pain. Qty: 30 g, Refills: 0    Associated Diagnoses: Primary pancreatic cancer with metastasis to other site [facility]); CINV (chemotherapy-induced nausea and vomiting)      amLODIPine (NORVASC) 10 mg tablet Take 1 Tab by mouth daily. Qty: 30 Tab, Refills: 1      hydroCHLOROthiazide (HYDRODIURIL) 25 mg tablet Take 1 Tab by mouth daily. Indications: hypertension  Qty: 30 Tab, Refills: 1      labetalol (NORMODYNE) 200 mg tablet Take 1 Tab by mouth two (2) times a day.  Indications: hypertension  Qty: 60 Tab, Refills: 1      pravastatin (PRAVACHOL) 40 mg tablet Take 40 mg by mouth nightly. Indications: HYPERCHOLESTEROLEMIA      thiamine (VITAMIN B-1) 100 mg tablet Take 100 mg by mouth daily. vitamin E (AQUA GEMS) 400 unit capsule Take 800 Units by mouth daily. Cholecalciferol, Vitamin D3, (VITAMIN D3) 1,000 unit cap Take 2 Tabs by mouth daily.              The patient's chart, MAR and AVS were reviewed by PB WaiteD.    Discharging Provider: Desire Hale MD

## 2018-07-11 NOTE — DISCHARGE SUMMARY
Physician Discharge Summary     Patient ID:  Alanna Heard  [id number]  12 y.o.  5/8/1926    Admit Date: 7/6/2018    Discharge Date: 7/11/2018    Admission Diagnoses: SBO (small bowel obstruction) ([facility] Metastasis from pancrea*    Discharge Diagnoses: Active Problems:    SBO (small bowel obstruction) ([address] (7/6/2018)      Metastasis from pancreatic cancer ([facility] (7/6/2018)         Admission Condition: Poor    Discharge Condition: Good    Last Procedure: * No surgery found [facility] Course:   Pt admitted with sbo, likely from herniation of small bowel into ostomy site. Managed non-operatively and now appears to be resolved, tolerating diet well and having return of bowel function. Consults: None    Disposition: home    Patient Instructions:   Current Discharge Medication List      CONTINUE these medications which have NOT CHANGED    Details   metoclopramide HCl (REGLAN) 5 mg tablet Take 5 mg by mouth four (4) times daily as needed for Nausea. Omeprazole delayed release (PRILOSEC D/R) 20 mg tablet Take 20 mg by mouth daily. loperamide (IMMODIUM) 2 mg tablet Take 2 mg by mouth daily as needed for Diarrhea.      acetaminophen (TYLENOL EXTRA STRENGTH) 500 mg tablet Take 500 mg by mouth daily as needed for Pain. calcium carbonate (TUMS) 200 mg calcium (500 mg) chew Take 1 Tab by mouth four (4) times daily as needed ('Heart Burn/Acid Reflux'). lidocaine-prilocaine (EMLA) topical cream Apply  to affected area as needed for Pain. Qty: 30 g, Refills: 0    Associated Diagnoses: Primary pancreatic cancer with metastasis to other site [facility]); CINV (chemotherapy-induced nausea and vomiting)      amLODIPine (NORVASC) 10 mg tablet Take 1 Tab by mouth daily. Qty: 30 Tab, Refills: 1      hydroCHLOROthiazide (HYDRODIURIL) 25 mg tablet Take 1 Tab by mouth daily. Indications: hypertension  Qty: 30 Tab, Refills: 1      labetalol (NORMODYNE) 200 mg tablet Take 1 Tab by mouth two (2) times a day. Indications: hypertension  Qty: 60 Tab, Refills: 1      pravastatin (PRAVACHOL) 40 mg tablet Take 40 mg by mouth nightly. Indications: HYPERCHOLESTEROLEMIA      thiamine (VITAMIN B-1) 100 mg tablet Take 100 mg by mouth daily. vitamin E (AQUA GEMS) 400 unit capsule Take 800 Units by mouth daily. Cholecalciferol, Vitamin D3, (VITAMIN D3) 1,000 unit cap Take 2 Tabs by mouth daily. STOP taking these medications       omeprazole (PRILOSEC PO) Comments:   Reason for Stopping:             Activity: Activity as tolerated  Diet: Regular Diet    Follow-up with pcp prn.   Follow-up tests/labs none    Signed:  Valorie Woods MD  [facility]  7/11/2018  7:54 AM

## 2018-07-11 NOTE — PROGRESS NOTES
CM informed that pt has been accepted to [facility] for [address]. CM will inform pt and son of the following information.     JANINE Rico CM  [phone]

## 2018-07-11 NOTE — PROGRESS NOTES
I have reviewed discharge instructions with the patient. The patient verbalized understanding. Port removed. VSS. Questions answered. Pt escorted to the front via wheelchair with family and staff.

## 2018-07-19 NOTE — IP AVS SNAPSHOT
[address] [facility] 
[phone] Patient: Evita Oseguera MRN: [id number] MKD:[id number] About your hospitalization You were admitted on:  July 20, 2018 You last received care in the:  [facility] 3 NEUROSCIENCE TELEMETRY You were discharged on:  July 24, 2018 Why you were hospitalized Your primary diagnosis was:  Not on File Your diagnoses also included:  Dysarthria, Metastasis From Pancreatic Cancer (Hcc), Htn (Hypertension), Hyperlipidemia, Gerd (Gastroesophageal Reflux Disease), Dom (Acute Kidney Injury) (Hcc), Hyponatremia, Convulsive Syncope, Altered Mental Status, Unspecified, Bilateral Carotid Artery Stenosis, Cerebral Microvascular Disease Follow-up Information Follow up With Details Comments Contact Info Gadiel Escalante MD   [address] [facility] 
[phone] [address] [facility]   hospice services  [address]  
[address] [place] [address] 
[phone] Discharge Orders None A check kari indicates which time of day the medication should be taken. My Medications CONTINUE taking these medications Instructions Each Dose to Equal  
 Morning Noon Evening Bedtime  
 metoclopramide HCl 5 mg tablet Commonly known as:  REGLAN Your last dose was: Your next dose is: Take 5 mg by mouth four (4) times daily as needed for Nausea. 5 mg  
    
   
   
   
  
 ondansetron hcl 4 mg tablet Commonly known as:  Ney Porteous Your last dose was: Your next dose is: Take 4 mg by mouth every eight (8) hours as needed for Nausea. 4 mg STOP taking these medications   
 amLODIPine 10 mg tablet Commonly known as:  NORVASC  
   
  
 calcium carbonate 200 mg calcium (500 mg) Chew Commonly known as:  TUMS  
   
  
 hydroCHLOROthiazide 25 mg tablet Commonly known as:  HYDRODIURIL  
   
  
 labetalol 200 mg tablet Commonly known as:  NORMODYNE  
   
  
 lidocaine-prilocaine topical cream  
Commonly known as:  EMLA Omeprazole delayed release 20 mg tablet Commonly known as:  PRILOSEC D/R  
   
  
 pravastatin 40 mg tablet Commonly known as:  PRAVACHOL  
   
  
 TYLENOL EXTRA STRENGTH 500 mg tablet Generic drug:  acetaminophen VITAMIN B-1 100 mg tablet Generic drug:  thiamine HCL  
   
  
 VITAMIN D3 1,000 unit Cap Generic drug:  cholecalciferol  
   
  
 vitamin E 400 unit capsule Commonly known as:  [address] Discharge Instructions None GetFeedbackToledo Announcement We are excited to announce that we are making your provider's discharge notes available to you in Rover. You will see these notes when they are completed and signed by the physician that discharged you from your recent hospital stay. If you have any questions or concerns about any information you see in Rover, please call the Health Information Department where you were seen or reach out to your Primary Care Provider for more information about your plan of care. Introducing [facility]! [employer] introduces Rover patient portal. Now you can access parts of your medical record, email your doctor's office, and request medication refills online. 1. In your internet browser, go to [email or web address] 2. Click on the First Time User? Click Here link in the Sign In box. You will see the New Member Sign Up page. 3. Enter your Rover Access Code exactly as it appears below. You will not need to use this code after youve completed the sign-up process. If you do not sign up before the expiration date, you must request a new code. · Rover Access Code: [email or web address] Expires: 7/27/2018  8:23 AM 
 
4.  Enter the last four digits of your Social Security Number (xxxx) and Date of Birth (mm/dd/yyyy) as indicated and click Submit. You will be taken to the next sign-up page. 5. Create a iSTARt ID. This will be your Gogoyoko login ID and cannot be changed, so think of one that is secure and easy to remember. 6. Create a iSTARt password. You can change your password at any time. 7. Enter your Password Reset Question and Answer. This can be used at a later time if you forget your password. 8. Enter your e-mail address. You will receive e-mail notification when new information is available in [address] 9. Click Sign Up. You can now view and download portions of your medical record. 10. Click the Download Summary menu link to download a portable copy of your medical information. If you have questions, please visit the Frequently Asked Questions section of the Gogoyoko website. Remember, Gogoyoko is NOT to be used for urgent needs. For medical emergencies, dial 911. Now available from your iPhone and Android! Introducing Rob Zepeda As a [facility] patient, I wanted to make you aware of our electronic visit tool called Rob Zepeda. [facility] 24/7 allows you to connect within minutes with a medical provider 24 hours a day, seven days a week via a mobile device or tablet or logging into a secure website from your computer. You can access Rob Zepeda from anywhere in the [place]. A virtual visit might be right for you when you have a simple condition and feel like you just dont want to get out of bed, or cant get away from work for an appointment, when your regular [facility] provider is not available (evenings, weekends or holidays), or when youre out of town and need minor care. Electronic visits cost only $49 and if the [facility] 24/7 provider determines a prescription is needed to treat your condition, one can be electronically transmitted to a nearby pharmacy*. Please take a moment to enroll today if you have not already done so. The enrollment process is free and takes just a few minutes. To enroll, please download the Reeher 24/7 makenna to your tablet or phone, or visit [email or web address] to enroll on your computer. And, as an [address] [place] [address] patient with a Jimdo account, the results of your visits will be scanned into your electronic medical record and your primary care provider will be able to view the scanned results. We urge you to continue to see your regular Reeher provider for your ongoing medical care. And while your primary care provider may not be the one available when you seek a Smallknot virtual visit, the peace of mind you get from getting a real diagnosis real time can be priceless. For more information on Smallknot, view our Frequently Asked Questions (FAQs) at [email or web address]. Sincerely, 
 
Umm Chery MD 
Chief Medical Officer [address] [place] [address] *:  certain medications cannot be prescribed via Smallknot Unresulted Labs-Please follow up with your PCP about these lab tests Order Current Status VITAMIN D, 25 HYROXY PANEL In process Providers Seen During Your Hospitalization Provider Specialty Primary office phone Jeni Roth DO Emergency Medicine [phone] Julius Guthrie MD Internal Medicine [phone] Kara Seip, MD Internal Medicine [phone] Your Primary Care Physician (PCP) Primary Care Physician Office Phone Office Fax Ron Montanez [phone] You are allergic to the following Allergen Reactions Aleve (Naproxen Sodium) Swelling Throat swelling Recent Documentation Height Weight BMI Smoking Status 1.829 m 81.6 kg 24.41 kg/m2 Never Smoker Emergency Contacts Name Discharge Info Relation Home Work Mobile Harmeet Aldana DISCHARGE CAREGIVER [3] Son [22] [phone] Isabell Nieves DISCHARGE CAREGIVER [3] Other Relative [6] [phone] Patient Belongings The following personal items are in your possession at time of discharge: 
  Dental Appliances: None  Visual Aid: None      Home Medications: None   Jewelry: None  Clothing: At bedside    Other Valuables: None Please provide this summary of care documentation to your next provider. Signatures-by signing, you are acknowledging that this After Visit Summary has been reviewed with you and you have received a copy. Patient Signature:  ____________________________________________________________ Date:  ____________________________________________________________  
  
[facility] Provider Signature:  ____________________________________________________________ Date:  ____________________________________________________________

## 2018-07-20 PROBLEM — R41.82 ALTERED MENTAL STATUS, UNSPECIFIED: Status: ACTIVE | Noted: 2018-01-01

## 2018-07-20 PROBLEM — E78.5 HYPERLIPIDEMIA: Status: ACTIVE | Noted: 2018-01-01

## 2018-07-20 PROBLEM — I67.89 CEREBRAL MICROVASCULAR DISEASE: Status: ACTIVE | Noted: 2018-01-01

## 2018-07-20 PROBLEM — E87.1 HYPONATREMIA: Status: ACTIVE | Noted: 2018-01-01

## 2018-07-20 PROBLEM — N17.9 AKI (ACUTE KIDNEY INJURY) (HCC): Status: ACTIVE | Noted: 2018-01-01

## 2018-07-20 PROBLEM — R55 CONVULSIVE SYNCOPE: Status: ACTIVE | Noted: 2018-01-01

## 2018-07-20 PROBLEM — I65.23 BILATERAL CAROTID ARTERY STENOSIS: Status: ACTIVE | Noted: 2018-01-01

## 2018-07-20 PROBLEM — K21.9 GERD (GASTROESOPHAGEAL REFLUX DISEASE): Status: ACTIVE | Noted: 2018-01-01

## 2018-07-20 PROBLEM — R47.1 DYSARTHRIA: Status: ACTIVE | Noted: 2018-01-01

## 2018-07-20 NOTE — ED NOTES
Power port accessed using 3/4 in seals needle by this RN using sterile procedure, pt tolerated well.

## 2018-07-20 NOTE — PROGRESS NOTES
Pharmacy Clarification of the Prior to Admission Medication Regimen Retrospective to the Admission Medication Reconciliation    The patient was interviewed regarding clarification of the prior to admission medication regimen. Patient stated he did not know what medications he takes, that his son, Homer Ellis, has a med list.     MHT called the patient's son, Homer Ellis, [phone], and left a voice message. T called Homer Ellis back at a later time, and was able to speak with Homer Ellis regarding the patient's PTA medications. Information Obtained From: Patient, patient's son, RX Query    Recommendations/Findings: The following amendments were made to the patient[facility]:     1) Additions:    ondansetron hcl (ZOFRAN) 4 mg tablet    2) Removals:    loperamide    3) Changes: NONE    4) Pertinent Pharmacy Findings:   lidocaine-prilocaine (EMLA) topical cream and acetaminophen (TYLENOL EXTRA STRENGTH) 500 mg tablet: Patient stated he has these PRN  agents at home, but has not needed to use these agents as of 7/20/18     PTA medication list was corrected to the following:     Prior to Admission Medications   Prescriptions Last Dose Informant Patient Reported? Taking? Cholecalciferol, Vitamin D3, (VITAMIN D3) 1,000 unit cap 7/19/2018 at Unknown time Child Yes Yes   Sig: Take 2 Tabs by mouth daily. Omeprazole delayed release (PRILOSEC D/R) 20 mg tablet 7/19/2018 at Unknown time Child Yes Yes   Sig: Take 20 mg by mouth daily. acetaminophen (TYLENOL EXTRA STRENGTH) 500 mg tablet Not Taking at Unknown time Child Yes No   Sig: Take 500 mg by mouth daily as needed for Pain. amLODIPine (NORVASC) 10 mg tablet 7/19/2018 at Unknown time Child No Yes   Sig: Take 1 Tab by mouth daily.    calcium carbonate (TUMS) 200 mg calcium (500 mg) chew 7/19/2018 at Unknown time Child Yes Yes   Sig: Take 1 Tab by mouth four (4) times daily as needed ('Heart Burn/Acid Reflux').   hydroCHLOROthiazide (HYDRODIURIL) 25 mg tablet 7/19/2018 at Unknown time Child No Yes   Sig: Take 1 Tab by mouth daily. Indications: hypertension   labetalol (NORMODYNE) 200 mg tablet 7/19/2018 at Unknown time Child No Yes   Sig: Take 1 Tab by mouth two (2) times a day. Indications: hypertension   lidocaine-prilocaine (EMLA) topical cream Not Taking at Unknown time Child No No   Sig: Apply  to affected area as needed for Pain.   metoclopramide HCl (REGLAN) 5 mg tablet 7/19/2018 at 1830 Child Yes Yes   Sig: Take 5 mg by mouth four (4) times daily as needed for Nausea. ondansetron hcl (ZOFRAN) 4 mg tablet 7/19/2018 at 1815 Child Yes Yes   Sig: Take 4 mg by mouth every eight (8) hours as needed for Nausea. pravastatin (PRAVACHOL) 40 mg tablet 7/19/2018 at Unknown time Child Yes Yes   Sig: Take 40 mg by mouth nightly. Indications: HYPERCHOLESTEROLEMIA   thiamine (VITAMIN B-1) 100 mg tablet 7/19/2018 at Unknown time Child Yes Yes   Sig: Take 100 mg by mouth daily. vitamin E (AQUA GEMS) 400 unit capsule 7/19/2018 at Unknown time Child Yes Yes   Sig: Take 800 Units by mouth daily.       Facility-Administered Medications: None          Thank you,  Sol Millard CPhT  Medication History Pharmacy Technician

## 2018-07-20 NOTE — PROGRESS NOTES
Speech LAnguage Pathology evaluation/discharge  Patient: Agustín Arndt (45 y.o. male)  Date: 7/20/2018  Primary Diagnosis: Dysarthria        Precautions: none       ASSESSMENT :  Based on the objective data described below, the patient presents with dysarthria at time of admission which has now resolved. Patent with presbyphonia but now fully intelligible. .  Skilled therapy provided by a speech-language pathologist is not indicated at this time. PLAN :  Recommendations: Will sign off, please reconsult us if symptpms re-occur. Discharge Recommendations: None     SUBJECTIVE:   Patient stated I asked for some jello and the next thing I remember my son said he had called [phone]. OBJECTIVE:     Past Medical History:   Diagnosis Date    Arthritis     [facility]     ECG abnormality 4/30/2018    GERD (gastroesophageal reflux disease)     High cholesterol     Hypertension     Lung cancer ([facility]     Other ill-defined conditions(799.89) ~2000    arm tingling to ER, per pt states was not a stroke, but has been on plavix and aggrenox since.   currently on plavix    Pancreatic cancer ([facility]     Sleep apnea with use of continuous positive airway pressure (CPAP)      Past Surgical History:   Procedure Laterality Date    HX CATARACT REMOVAL Bilateral 2008 and 2012    HX COLECTOMY      HX HERNIA REPAIR Left 04/28/2018    Reduced & repaired [facility] w/sigmoid resection & end colostomy    HX ORTHOPAEDIC Right     elbow     TOTAL KNEE ARTHROPLASTY Left 1999    TOTAL KNEE ARTHROPLASTY Right 2011     Prior Level of Function/Home Situation:   Home Situation  Home Environment: Private residence  # Steps to Enter: 3  One/Two Story Residence: One story  Living Alone: No  Support Systems: Child(simran)  Patient Expects to be Discharged to[de-identified] Private residence  Current DME Used/Available at Home: None  Mental Status:  Neurologic State: Alert  Orientation Level: Appropriate for age  Cognition: Appropriate decision making  Perception: Appears intact        Motor Speech:  Oral-Motor Structure/Motor Speech  Face: No impairment  Labial: No impairment  Dentition: Natural  Oral Hygiene: clean  Velum: No impairment  Mandible: No impairment  Apraxic Characteristics: None  Dysarthric Characteristics: None (chart and family report an episode of dysarhtira/aphasia at time of event last night. No difficulty since. )  Intelligibility: No impairment  Rate: WNL  Prosody: WNL  Compensatory Strategies for Motor Speech: none needed  Language Comprehension and Expression:  Auditory Comprehension  Auditory Impairment: No  (hard of hearing, no difficulty hearing loud speech or following directins)  Hearing Aid: At home  Verbal Expression  Primary Mode of Expression: Verbal  Automatic Speech Task: No impairment  Naming: No impairment  Sentence Formulation: No impairment  Conversation: No impairment        Neuro-Linguistics:                                                       G Codes: In compliance with CMSs Claims Based Outcome Reporting, the following G-code set was chosen for this patient based the use of the NOMS functional outcome to quantify this patient's level of motor speech impairment. Using the NOMS, the patient was determined to be at level 7 for motor speech function which correlates with the CH= 0% level of severity. Based on the objective assessment provided within this note, the current, goal, and discharge g-codes are as follows:     Motor   Current  CH= 0%   Goal  CH= 0%   D/C  CH= 0%    NOMS: 7 motor speech    Pain:  Pain Scale 1: Numeric (0 - 10)  Pain Intensity 1: 0     After treatment:   []   Patient left in no apparent distress sitting up in chair  [x]   Patient left in no apparent distress in bed  [x]   Call bell left within reach  []   Nursing notified  []   Caregiver present  []   Bed alarm activated    COMMUNICATION/EDUCATION:   The patients plan of care including findings and recommendations was discussed with: Speech Therapist and Registered Nurse. Patient was educated regarding His deficit(s) of transient dysarthria and to call 911 any time this re-occurs as this relates to His diagnosis of neuro work up. He demonstrated Good understanding as evidenced by his responses. [x]   Patient/family have participated as able and agree with findings and recommendations. []   Patient is unable to participate in plan of care at this time.     Thank you for this referral.  LAMIN Alejandra  Time Calculation: 20 mins

## 2018-07-20 NOTE — PROGRESS NOTES
Pt admitted earlier this morning by my partner. He is seen on rounds today as courtesy. Pt says he is feeling near baseline today. Of note, he had a recent admission 7/6-11 for SBO r/t incarcerated inguinal hernia, pancreatic ca. He says that he was doing fairly well, but over the last two days has had some \"stomach problems\" and not been eating as much. He does say that he continues to have good stool output from his ostomy. IMP/PLAN:  Likely convulsive syncope due to orthostatic hypotension with dehydration/poor po intake in setting of metastatic pancreatic ca:  +dysarthria and slurred speech after episode of starring for few minutes  - CT head with no acute findings. Nonspecific white matter changes most common seen with chronic small vessel ischemic and/or senescent change. Intracranial atherosclerosis. - MRI brain pending  - carotid dopplers with bilateral <50% stenosis of the internal carotid arteries  - echo today with EF 55-60%. There were no regional wall motion abnormalities. - EEG pending  - appreciate neurology assistance  - IV fluids today  Mild KHUSHBU (resolved) and hyponatremia: due to dehydration and HCTZ  - stopped HCTZ  - con't IV fluids today   Hypokalemia:  - con't replacement  - check K, mag in AM   Metastatic pancreatic cancer with mets to lungs and peritoneum:  - appreciate oncology assistance  - has received palliative chemo x1.   Unclear if he will be candidate for further therapy given multiple admissions and apparent declining health.   Orthostatic hypotension in setting of benign HTN:   - holding norvasc, labetalol, HCTZ with orthostasis  - recheck orthostatic VS in AM  - con't prn nitropaste  Hyperlipidemia:  D/c statin due to very advanced age and poor life expectancy  GERD (gastroesophageal reflux disease): con't PPI  Severe malnutrition: Pt reports he has lost ~15 lbs (7.5%) in the past 3 months due to pancreatic cancer and Incarcerated Left Inguinal Hernia with SBO  - nutrition consulted  - supplements added     Code Status: DNR  Surrogate Decision Maker: son   DVT Prophylaxis: Lovenox    No charge.

## 2018-07-20 NOTE — PROGRESS NOTES
Spiritual Care Assessment/Progress Note  [facility]      NAME: Daisy Loyd      MRN: [id number]  AGE: 80 y.o. SEX: male  Sikh Affiliation: Baptist   Language: English     7/20/2018     Total Time (in minutes): 5     Spiritual Assessment begun in [facility] EMERGENCY DEPT through conversation with:         []Patient        [] Family    [] Friend(s)        Reason for Consult: Emergency Department visit, Palliative Care, Initial/Spiritual Assessment     Spiritual beliefs: (Please include comment if needed)     [] Identifies with a grady tradition:         [] Supported by a grady community:            [] Claims no spiritual orientation:           [] Seeking spiritual identity:                [] Adheres to an individual form of spirituality:           [x] Not able to assess:                           Identified resources for coping:      [] Prayer                               [] Music                  [] Guided Imagery     [] Family/friends                 [] Pet visits     [] Devotional reading                         [] Unknown     [] Other:                                          Interventions offered during this visit: (See comments for more details)    Patient Interventions: Initial visit           Plan of Care:     [x] Support spiritual and/or cultural needs    [] Support AMD and/or advance care planning process      [] Support grieving process   [] Coordinate Rites and/or Rituals    [] Coordination with community clergy   [] No spiritual needs identified at this time   [] Detailed Plan of Care below (See Comments)  [] Make referral to Music Therapy  [] Make referral to Pet Therapy     [] Make referral to Addiction services  [] Make referral to [facility]  [] Make referral to Spiritual Care Partner  [] No future visits requested        [] Follow up visits as needed     Comments: Initial visit attempted with patient in ER. Patient was alone at this time; no family present.  Patient was resting with eyes closed at this time. I did not disturb patient at this time. Pastoral care will attempt to follow up with patient at a later time. [phone]. Visit by: Otilia Napoles. Red Navas.  Amrit Sargent MA, [facility]    Lead  Profession Development & Advancement

## 2018-07-20 NOTE — PROCEDURES
[facility]  *** FINAL REPORT ***    Name: Concepcion Pathak  MRN: [id number]    Inpatient  : 08 May 1926  HIS Order #: [id number]  [id number] [facility] Visit #: [id number]  Date: 2018    TYPE OF TEST: Cerebrovascular Duplex    REASON FOR TEST  Transient ischemic attacks    Right Carotid:-             Proximal               Mid                 Distal  cm/s  Systolic  Diastolic  Systolic  Diastolic  Systolic  Diastolic  CCA:     65.5       0.0                            76.0      11.0  Bulb:  ECA:    144.0       0.0  ICA:     52.0       7.0       17.0      12.0       53.0      10.0  ICA/CCA:  0.7       0.6    ICA Stenosis: <50%    Right Vertebral:-  Finding: Antegrade  Sys:       53.0  Mayuri:        0.0    Right Subclavian: Normal    Left Carotid:-            Proximal                Mid                 Distal  cm/s  Systolic  Diastolic  Systolic  Diastolic  Systolic  Diastolic  CCA:     83.4      10.0                            73.0      12.0  Bulb:  ECA:     85.0       0.0  ICA:     56.0      15.0       49.0      16.0       46.0      11.0  ICA/CCA:  0.8       1.3    ICA Stenosis: <50%    Left Vertebral:-  Finding: Antegrade  Sys:       65.0  Mayuri:       13.0    Left Subclavian: Normal    INTERPRETATION/FINDINGS  PROCEDURE:  Carotid Duplex Examination using B-mode, color and  spectral Doppler of the extracranial cerebrovascular arteries. 1. Bilateral <50% stenosis of the internal carotid arteries. 2. No significant stenosis in the external carotid arteries  bilaterally. 3. Antegrade flow in both vertebral arteries. 4. Normal flow in both subclavian arteries. Plaque Morphology:  1. Calcific plaque in the bulb and right ICA. 2. Calcific plaque in the bulb and left ICA. ADDITIONAL COMMENTS    I have personally reviewed the data relevant to the interpretation of  this  study. TECHNOLOGIST: Gigi Rojas RVT, SEMS  Signed: 2018 10:40 AM    PHYSICIAN: Igor Louis MD  Signed: 07/20/2018 01:16 PM

## 2018-07-20 NOTE — PROGRESS NOTES
Came to see patient in ED, he is off the floor and in transit. Will attempt follow up when he is available for dysarthria eval. Note patient already with diet.      Hi Alfred M.S., CCC-SLP

## 2018-07-20 NOTE — PROGRESS NOTES
[id number] [facility]  Preliminary Report:  Carotid Duplex Scan    Right:  Moderate plaque noted in the right carotid system. Right ICA velocities suggest less than 50% diameter reduction. Right vertebral artery flow is antegrade. Left:  Moderate plaque noted in the left carotid system. Left ICA velocities suggest less than 50% diameter reduction. Left vertebral artery flow is antegrade. Final report to follow.

## 2018-07-20 NOTE — ROUTINE PROCESS
Bedside and Verbal shift change report given to April (oncoming nurse) by Tan Fink (offgoing nurse). Report included the following information SBAR, Kardex, Intake/Output and MAR. Zone Phone:   [phone]      Significant changes during shift:  New admit        Patient Melissa Panchal  80 y.o.  7/19/2018  8:55 PM by Carmelina Willis MD. Gina Antoine was admitted from Home    Problem List    Patient Active Problem List    Diagnosis Date Noted    Dysarthria 07/20/2018    Hyperlipidemia 07/20/2018    GERD (gastroesophageal reflux disease) 07/20/2018    KHUSHBU (acute kidney injury) ([facility] [address] 07/20/2018    Hyponatremia 07/20/2018    Convulsive syncope 07/20/2018    Altered mental status, unspecified 07/20/2018    Bilateral carotid artery stenosis 07/20/2018    Cerebral microvascular disease 07/20/2018    SBO (small bowel obstruction) ([address] 07/06/2018    Metastasis from pancreatic cancer ([facility] [address] 07/06/2018    HTN (hypertension) 05/03/2018    Primary pancreatic cancer with metastasis to other site ([address] 05/03/2018    ECG abnormality 04/30/2018    Pancreatic mass 04/28/2018    Hernia, inguinal, recurrent, with obstruction 04/28/2018     Past Medical History:   Diagnosis Date    Arthritis     Colon cancer ([address]     ECG abnormality 4/30/2018    GERD (gastroesophageal reflux disease)     High cholesterol     Hypertension     Lung cancer ([address]     Other ill-defined conditions(799.89) ~2000    arm tingling to ER, per pt states was not a stroke, but has been on plavix and aggrenox since.   currently on plavix    Pancreatic cancer ([address]     Sleep apnea with use of continuous positive airway pressure (CPAP)          Core Measures:    CVA: Yes Yes    Activity Status:    OOB to Chair No  Ambulated this shift Yes   Bed Rest No      LINES AND DRAINS:    Ostomy    DVT prophylaxis:    DVT prophylaxis Med- Yes  DVT prophylaxis SCD or BUFFY- No     Patient Safety:    Falls Score Total Score: 2  Safety Level_______  Bed Alarm On? No  Sitter?  No    Plan for upcoming shift: MRI, safety        Discharge Plan: No     Active Consults:  IP CONSULT TO NEUROLOGY  IP CONSULT TO PALLIATIVE CARE - PROVIDER  IP CONSULT TO ONCOLOGY

## 2018-07-20 NOTE — CONSULTS
[address] [facility] [place] at [address], [facility], [address] [place], [address] [place] [address] 
[phone] Reason for consultation:  
Evita Oseguera is a 80 y.o. male with metastatic pancreatic cancer who is seen in consultation at the request of Dr. Hartman Killer Treatment History: 1. 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  
 one dose of gem/abraxane 6/27/18 History of Present Illness:  
Schuyler Bence is a 81 yo male with a recent diagnosis of metastatic pancreatic cancer. He received one cycle of chemotherapy with gemcitabine and abraxane. Chemotherapy was held on 7/5/18 due to vomiting and pt not feeling well. He later went to the ED and was found to have a SBO and hospitalized. Obstruction resolved with non-surgical management. He was scheduled to resume chemotherapy next week. He presented to the ED yesterday with complaints of AMS and dysarthria. He was admitted for further evaluation. Neurology has seen the patient and believes that he most likely had a syncopal episode related to hypotension. Mr. Beatrice Blanca is back to baseline and feels well today. Past Medical History:  
Diagnosis Date  Arthritis  Colon cancer ([address]  ECG abnormality 4/30/2018  GERD (gastroesophageal reflux disease)  High cholesterol  Hypertension  Lung cancer ([address]  Other ill-defined conditions(799.89) ~2000  
 arm tingling to ER, per pt states was not a stroke, but has been on plavix and aggrenox since. currently on plavix  Pancreatic cancer ([address]  Sleep apnea with use of continuous positive airway pressure (CPAP) Past Surgical History:  
Procedure Laterality Date  HX CATARACT REMOVAL Bilateral 2008 and 2012  HX COLECTOMY  HX HERNIA REPAIR Left 04/28/2018 Reduced & repaired [facility] w/sigmoid resection & end colostomy  HX ORTHOPAEDIC Right   
 elbow  TOTAL KNEE ARTHROPLASTY Left 1999  TOTAL KNEE ARTHROPLASTY Right 2011 Social History Substance Use Topics  Smoking status: Never Smoker  Smokeless tobacco: Never Used  Alcohol use No  
  
Family History Problem Relation Age of Onset  Stroke Mother  Other Father Brain tumor Current Facility-Administered Medications Medication Dose Route Frequency  pantoprazole (PROTONIX) tablet 40 mg  40 mg Oral ACB  nitroglycerin (NITROBID) 2 % ointment 1 Inch  1 Inch Topical Q6H PRN  
 sodium chloride (NS) flush 5-10 mL  5-10 mL IntraVENous Q8H  
 sodium chloride (NS) flush 5-10 mL  5-10 mL IntraVENous PRN  
 acetaminophen (TYLENOL) tablet 650 mg  650 mg Oral Q4H PRN Or  
 acetaminophen (TYLENOL) solution 650 mg  650 mg Per NG tube Q4H PRN Or  
 acetaminophen (TYLENOL) suppository 650 mg  650 mg Rectal Q4H PRN  
 enoxaparin (LOVENOX) injection 40 mg  40 mg SubCUTAneous DAILY Allergies Allergen Reactions  Aleve [Naproxen Sodium] Swelling Throat swelling Review of Systems: A complete review of systems was obtained, negative except as described above. Physical Exam:  
 
Visit Vitals  /66 (BP 1 Location: Left arm, BP Patient Position: At rest)  Pulse 64  Temp 98 °F (36.7 °C)  Resp 18  Ht 6' (1.829 m)  Wt 180 lb (81.6 kg)  SpO2 97%  BMI 24.41 kg/m2 ECOG PS: 0 General: No distress, lethargic Eyes: PERRLA, anicteric sclerae HENT: Atraumatic with normal appearance of ears and nose; OP clear; NGT in place Neck: Supple; no thyromegaly Respiratory: CTAB, normal respiratory effort CV: Normal rate, regular rhythm, no murmurs, no peripheral edema GI: Soft, nontender, nondistended, no masses, no hepatomegaly, no splenomegaly; decreased BS 
MS: Normal gait and station. Digits without clubbing or cyanosis. Skin: No rashes, ecchymoses, or petechiae. Normal temperature, turgor, and texture. Neuro/Psych: lethargic Results:  
 
Lab Results Component Value Date/Time WBC 8.8 07/20/2018 04:14 AM  
 HGB 10.5 (L) 07/20/2018 04:14 AM  
 HCT 30.1 (L) 07/20/2018 04:14 AM  
 PLATELET 379 52/08/8179 04:14 AM  
 MCV 99.7 (H) 07/20/2018 04:14 AM  
 ABS. NEUTROPHILS 6.4 07/20/2018 04:14 AM  
 
Lab Results Component Value Date/Time Sodium 133 (L) 07/20/2018 04:14 AM  
 Potassium 3.2 (L) 07/20/2018 04:14 AM  
 Chloride 99 07/20/2018 04:14 AM  
 CO2 25 07/20/2018 04:14 AM  
 Glucose 129 (H) 07/20/2018 04:14 AM  
 BUN 18 07/20/2018 04:14 AM  
 Creatinine 1.12 07/20/2018 04:14 AM  
 GFR est AA >60 07/20/2018 04:14 AM  
 GFR est non-AA >60 07/20/2018 04:14 AM  
 Calcium 8.4 (L) 07/20/2018 04:14 AM  
 Glucose (POC) 117 (H) 11/30/2009 04:31 PM  
 
Lab Results Component Value Date/Time Bilirubin, total 1.2 (H) 07/20/2018 04:14 AM  
 ALT (SGPT) 15 07/20/2018 04:14 AM  
 AST (SGOT) 10 (L) 07/20/2018 04:14 AM  
 Alk. phosphatase 83 07/20/2018 04:14 AM  
 Protein, total 5.7 (L) 07/20/2018 04:14 AM  
 Albumin 2.7 (L) 07/20/2018 04:14 AM  
 Globulin 3.0 07/20/2018 04:14 AM  
 
7/7/18 CT a/p FINDINGS:  
LUNG BASES: Numerous nodules and masses. Minimal bilateral effusions. 
  
INCIDENTALLY IMAGED HEART AND MEDIASTINUM: Trace pericardial fluid. Esophageal 
wall thickening and hyperemia. LIVER: No mass or biliary dilatation. GALLBLADDER: Cholelithiasis without inflammatory change. SPLEEN: No mass. PANCREAS: Heterogeneously cystic and solid lesion in the tail and body, 
measuring 6.7 x 4.8 x 4.6 cm, containing calcification without obvious 
associated ductal dilatation. Pancreatic tail atrophy. 
  
ADRENALS: Unremarkable. KIDNEYS: No mass, calculus, or hydronephrosis. STOMACH: Distended with fluid. SMALL BOWEL: Increased distention of small bowel loops with decompressed distal 
small bowel loop. Decompressed colon as well. Transition point related to a 
spigelian hernia on the left.  
COLON: There is a left inguinal hernia as well this is not the current source of 
obstruction. Right inguinal hernia contains fluid. . Numerous diverticula in the 
sigmoid colon. APPENDIX: Not well seen. PERITONEUM: No pneumoperitoneum. Ascites. RETROPERITONEUM: No lymphadenopathy or aortic aneurysm. Atherosclerotic change. REPRODUCTIVE ORGANS: Enlarged prostate. URINARY BLADDER: No mass or calculus. BONES: No destructive bone lesion. ADDITIONAL COMMENTS:  
IMPRESSION IMPRESSION: 
  
Small bowel obstruction related to spigelian hernia on the left. Left inguinal 
hernia contains large bowel and does not cause obstruction. Smaller right 
inguinal hernia containing fluid. Cystic and solid mass of the pancreas not significantly changed. Multiple pulmonary nodules and small pleural effusions. Assessment and Recommendations:  
1. AMS - resolved Recommendations per neurology 2. Metastatic pancreatic cancer, stage IV, to lungs and peritoneum: S/p one dose of chemotherapy with Gemcitabine and Abraxane. Goal of therapy is palliative. Follow-up outpatient to discuss continuation of treatment.   
 
 
Signed By: Stephen Mendez NP

## 2018-07-20 NOTE — ED NOTES
Bedside and Verbal shift change report given to Nisha Boswell RN (oncoming nurse) by Mable Cerrato RN (offgoing nurse). Report included the following information SBAR, ED Summary, MAR and Recent Results.

## 2018-07-20 NOTE — ROUTINE PROCESS
TRANSFER - OUT REPORT:    Verbal report given to AJIT Farley(name) on [place]  being transferred to [facility](unit) for routine progression of care       Report consisted of patients Situation, Background, Assessment and   Recommendations(SBAR). Information from the following report(s) SBAR, ED Summary, [facility] and Recent Results was reviewed with the receiving nurse. Lines:   Venous Access Device power port (Active)        Opportunity for questions and clarification was provided.

## 2018-07-20 NOTE — WOUND CARE
Wound / ostomy nurse consulted for a patient with a colostomy. Patient is not a new colostomy patient and will be cared for by the staff nurse. Instructions: Empty the colostomy bag when 1/2 full and wipe the outlet with wet wash cloth. Apply a new bag every 3-5 days. Supplies available on the general surgery unit. Apply a bag # [id number] if the patient has a normal pink stoma.    Sharita Montes De Oca RN, BSN, [employer]

## 2018-07-20 NOTE — PROGRESS NOTES
Initial Nutrition Assessment:    INTERVENTIONS/RECOMMENDATIONS:   · Consider regular diet due to age  · Ensure TID    ASSESSMENT:   Chart reviewed, medically noted for pancreatic cancer, recent SBO, GERD and PMH shown below. Nutrition consulted for general nutrition management and pt also triggered due to MST score. Pt states he has lost ~15 lbs (7.5%) in the past 3 months due to pancreatic cancer and Incarcerated Left Inguinal Hernia with SBO. He had also was consuming very little PTA. Pt meets ASPEN criteria for acute severe malnutrition, see below. Meets Criteria for Acute Malnutrition   [x] Severe Malnutrition, as evidenced by:   [] Moderate muscle wasting, loss of subcutaneous fat   [x] Nutritional intake of <50% of recommended intake for >5 days   [x] Weight loss of >1-2% in 1 week, >5% in 1 month, >7.5% in 3 months, or >10% in 6 months   [] Moderate-severe edema   []Moderate Malnutrition, as evidenced by:   [] Mild muscle wasting, loss of subcutaneous fat   [] Nutritional intake <75% of recommended intake for >1 week   [] Weight loss of 1-2% in 1 week, 5% in 1 month, 7.5% in 3 months, or 10% in 6 months   [] Mild edema      Past Medical History:   Diagnosis Date    Arthritis     [facility] [address]     ECG abnormality 4/30/2018    GERD (gastroesophageal reflux disease)     High cholesterol     Hypertension     Lung cancer ([facility] [address]     Other ill-defined conditions(799.89) ~2000    arm tingling to ER, per pt states was not a stroke, but has been on plavix and aggrenox since. currently on plavix    Pancreatic cancer ([facility] [address]     Sleep apnea with use of continuous positive airway pressure (CPAP)        Diet Order: Cardiac  % Eaten:  No data found.     Pertinent Medications: [x]Reviewed: PPI  Pertinent Labs: [x]Reviewed: K+ 3.2,   Food Allergies: [x]NKFA  []Other   Last BM: colostomy  Edema:        []RUE   []LUE   []RLE   []LLE      Pressure Injury:      [] Stage I   [] Stage II   [] Stage III   [] Stage IV      Wt Readings from Last 30 Encounters:   07/19/18 81.6 kg (180 lb)   07/14/18 83 kg (183 lb)   07/06/18 84.1 kg (185 lb 6.5 oz)   07/05/18 84.1 kg (185 lb 7 oz)   07/05/18 84.1 kg (185 lb 7 oz)   06/27/18 82.6 kg (182 lb)   06/27/18 83 kg (182 lb 14.4 oz)   06/22/18 81.2 kg (179 lb)   06/14/18 83.5 kg (184 lb)   06/07/18 84.1 kg (185 lb 6.4 oz)   06/01/18 81.6 kg (179 lb 12.8 oz)   05/05/18 92.1 kg (203 lb)   04/28/18 87.9 kg (193 lb 12.8 oz)   02/27/14 91.6 kg (202 lb)       Anthropometrics:   Height: 6' (182.9 cm) Weight: 81.6 kg (180 lb)   IBW (%IBW):   ( ) UBW (%UBW):   (  %)   Last Weight Metrics:  Weight Loss Metrics 7/19/2018 7/14/2018 7/6/2018 7/5/2018 7/5/2018 6/27/2018 6/27/2018   Today's Wt 180 lb 183 lb 185 lb 6.5 oz 185 lb 7 oz 185 lb 7 oz 182 lb 182 lb 14.4 oz   BMI 24.41 kg/m2 24.82 kg/m2 25.15 kg/m2 25.15 kg/m2 25.15 kg/m2 24.68 kg/m2 24.81 kg/m2       BMI: Body mass index is 24.41 kg/(m^2). This BMI is indicative of:   []Underweight    [x]Normal    []Overweight    [] Obesity   [] Extreme Obesity (BMI>40)     Estimated Nutrition Needs (Based on):   1950 Kcals/day (BMR: 1500 x 1.3) , 80 g (1 g/kg) Protein  Carbohydrate:  At Least 130 g/day  Fluids: 1950 mL/day (1ml/kcal) or per primary team    NUTRITION DIAGNOSES:   Problem:  Inadequate protein-energy intake      Etiology: related to GERD and recent SBO     Signs/Symptoms: as evidenced by pt reports of poor PO intake PTA      NUTRITION INTERVENTIONS:  Meals/Snacks: General/healthful diet   Supplements: Commercial supplement              GOAL:   consume >50% of meals and ONS in 2-4 days    LEARNING NEEDS (Diet, Food/Nutrient-Drug Interaction):    [x] None Identified   [] Identified and Education Provided/Documented   [] Identified and Pt declined/was not appropriate     Cultureal, Rastafarian, [place] Ethnic Dietary Needs:    [x] None Identified   [] Identified and Addressed     [x] Interdisciplinary Care Plan Reviewed/Documented    [x] Discharge Planning:  General healthy diet with ONS prn to prevent weight loss     MONITORING /EVALUATION:      Food/Nutrient Intake Outcomes:  Total energy intake  Physical Signs/Symptoms Outcomes: Weight/weight change, Electrolyte and renal profile, GI    NUTRITION RISK:    [x] High              [] Moderate           []  Low  []  Minimal/Uncompromised    PT SEEN FOR:    [x]  MD Consult: []Calorie Count      []Diabetic Diet Education        []Diet Education     []Electrolyte Management     [x]General Nutrition Management and Supplements     []Management of Tube Feeding     []TPN Recommendations    [x]  RN Referral:  [x]MST score >=2     []Enteral/Parenteral Nutrition PTA     []Pregnant: Gestational DM or Multigestation     []Pressure Ulcer/Wound Care needs        []  Low BMI  []  LOS Referral       Suzy Robles RDN  Pager [phone]  Weekend Pager [phone]

## 2018-07-20 NOTE — H&P
Hospitalist Admission Note NAME: Chevis Closs :  [id number] MRN:  [id number] Date/Time:  2018 12:18 AM 
 
Patient PCP: Marcia Perez MD 
______________________________________________________________________ Given the patient's current clinical presentation, I have a high level of concern for decompensation if discharged from the emergency department. Complex decision making was performed, which includes reviewing the patient's available past medical records, laboratory results, and x-ray films. My assessment of this patient's clinical condition and my plan of care is as follows. Assessment / Plan: 
Dysarthria and slurred speech after episode of starring for few minutes Concern for seizure, r/o TIA in settings of metastatic pancreatic cancer vs related to dehydration vs declining due to cancer Admit to neurotele Check EEG, MRI brain with and without contrast, MRA brain, Carotid dopplers, 2D echo, FLP Neurology consult IVF as below Hold off on starting ASA as allergic to NSAIDs and low suspicion for TIA On statins Palliative consult as below CT head with No acute findings. Nonspecific white matter changes most common seen 
with chronic small vessel ischemic and/or senescent change. Intracranial atherosclerosis. Mild KHUSHBU and hyponatremia Suspect dehydration and HCTZ related Stop HCTZ IVF for 10 hours Monitor lytes Hypokalemia Replete and monitor Metastasis from pancreatic cancer Recurrent admission Poor prognosis per hematology recent consult and recommended palliative and hospice Will ask palliative care consult HTN (hypertension) Continue Norvasc and labetalol but will reduce dose of half with concern decline with metastatic cancer Stop HCTZ PRN nitropaste Hyperlipidemia On statins GERD (gastroesophageal reflux disease) On PPIs, will continue Code Status: DNR/DNI Surrogate Decision Maker: son DVT Prophylaxis: Lovenox Baseline: Lives with son Subjective: CHIEF COMPLAINT: Dysarthria and slurred speech after episode of starring HISTORY OF PRESENT ILLNESS:    
Zakiya Sage is a 80 y.o.  male who presents with Dysarthria and slurred speech after episode of starring. Pt doesn't remember what happened and claims he was watching TV and afterwards doesn't remember what happened. Pt symptoms seems to have resolved and information obtained from ED physician, nursing noted as unable to reach the family at this time. Pt denies any fever, chills, nausea, vomiting, diarrhea, chest pain, problems urination, focal weakness, dizziness or headaches. CT head with No acute findings. Nonspecific white matter changes most common seen with chronic small vessel ischemic and/or senescent change. Intracranial atherosclerosis. We were asked to admit for work up and evaluation of the above problems. Past Medical History:  
Diagnosis Date  Arthritis  Colon cancer ([address]  ECG abnormality 4/30/2018  GERD (gastroesophageal reflux disease)  High cholesterol  Hypertension  Lung cancer ([address]  Other ill-defined conditions(799.89) ~2000  
 arm tingling to ER, per pt states was not a stroke, but has been on plavix and aggrenox since. currently on plavix  Pancreatic cancer ([address]  Sleep apnea with use of continuous positive airway pressure (CPAP) Past Surgical History:  
Procedure Laterality Date  HX CATARACT REMOVAL Bilateral 2008 and 2012  HX COLECTOMY  HX HERNIA REPAIR Left 04/28/2018 Reduced & repaired [facility] w/sigmoid resection & end colostomy  HX ORTHOPAEDIC Right   
 elbow [place] ARTHROPLASTY Left 1999  TOTAL KNEE ARTHROPLASTY Right 2011 Social History Substance Use Topics  Smoking status: Never Smoker  Smokeless tobacco: Never Used  Alcohol use No  
  
 
Family history: denies family history of seizures Allergies Allergen Reactions  Aleve [Naproxen Sodium] Swelling Throat swelling Prior to Admission medications Medication Sig Start Date End Date Taking? Authorizing Provider  
metoclopramide HCl (REGLAN) 5 mg tablet Take 5 mg by mouth four (4) times daily as needed for Nausea. Historical Provider Omeprazole delayed release (PRILOSEC D/R) 20 mg tablet Take 20 mg by mouth daily. Historical Provider  
loperamide (IMMODIUM) 2 mg tablet Take 2 mg by mouth daily as needed for Diarrhea. Historical Provider  
acetaminophen (TYLENOL EXTRA STRENGTH) 500 mg tablet Take 500 mg by mouth daily as needed for Pain. Historical Provider  
calcium carbonate (TUMS) 200 mg calcium (500 mg) chew Take 1 Tab by mouth four (4) times daily as needed ('Heart Burn/Acid Reflux'). Historical Provider  
lidocaine-prilocaine (EMLA) topical cream Apply  to affected area as needed for Pain. 6/27/18   Valencia Sahni NP  
amLODIPine (NORVASC) 10 mg tablet Take 1 Tab by mouth daily. 5/3/18   Linda Muñoz MD  
hydroCHLOROthiazide (HYDRODIURIL) 25 mg tablet Take 1 Tab by mouth daily. Indications: hypertension 5/3/18   Linda Muñoz MD  
labetalol (NORMODYNE) 200 mg tablet Take 1 Tab by mouth two (2) times a day. Indications: hypertension 5/3/18   Linda Muñoz MD  
pravastatin (PRAVACHOL) 40 mg tablet Take 40 mg by mouth nightly. Indications: HYPERCHOLESTEROLEMIA    Historical Provider  
thiamine (VITAMIN B-1) 100 mg tablet Take 100 mg by mouth daily. Historical Provider  
vitamin E (AQUA GEMS) 400 unit capsule Take 800 Units by mouth daily. Historical Provider Cholecalciferol, Vitamin D3, (VITAMIN D3) 1,000 unit cap Take 2 Tabs by mouth daily. Historical Provider REVIEW OF SYSTEMS:    
I am not able to complete the review of systems because: The patient is intubated and sedated The patient has altered mental status due to his acute medical problems  The patient has baseline aphasia from prior stroke(s) The patient has baseline dementia and is not reliable historian The patient is in acute medical distress and unable to provide information Total of 12 systems reviewed as follows:   
   POSITIVE= underlined text  Negative = text not underlined General:  fever, chills, sweats, generalized weakness, weight loss/gain,  
   loss of appetite Eyes:    blurred vision, eye pain, loss of vision, double vision ENT:    rhinorrhea, pharyngitis Respiratory:   cough, sputum production, SOB, BUI, wheezing, pleuritic pain  
Cardiology:   chest pain, palpitations, orthopnea, PND, edema, syncope Gastrointestinal:  abdominal pain , N/V, diarrhea, dysphagia, constipation, bleeding Genitourinary:  frequency, urgency, dysuria, hematuria, incontinence Muskuloskeletal :  arthralgia, myalgia, back pain Hematology:  easy bruising, nose or gum bleeding, lymphadenopathy Dermatological: rash, ulceration, pruritis, color change / jaundice Endocrine:   hot flashes or polydipsia Neurological:  headache, dizziness, confusion, focal weakness, paresthesia, Speech difficulties, facial droop and starring episode (reported by family earlier), memory loss, gait difficulty Psychological: Feelings of anxiety, depression, agitation Objective: VITALS:   
Visit Vitals  /58  Pulse (!) 56  Temp 98.7 °F (37.1 °C)  Resp 18  Ht 6' (1.829 m)  Wt 81.6 kg (180 lb)  SpO2 97%  BMI 24.41 kg/m2 PHYSICAL EXAM: 
 
General:    Alert, cooperative, no distress, appears stated age. HEENT: Atraumatic, anicteric sclerae, pink conjunctivae No oral ulcers, mucosa dry, throat clear, dentition fair Neck:  Supple, symmetrical,  thyroid: non tender Lungs:   Clear to auscultation bilaterally. No Wheezing or Rhonchi. No rales. Chest wall:  No tenderness  No Accessory muscle use. Heart:   Regular  rhythm,  No  murmur   No edema Abdomen:   Soft, non-tender. Not distended.   Bowel sounds normal 
Extremities: No cyanosis. No clubbing,   
  Skin turgor normal, Capillary refill normal, Radial dial pulse 2+ Skin:     Not pale. Not Jaundiced  No rashes Psych:  Good insight. Not depressed. Not anxious or agitated. Neurologic: EOMs intact. No facial asymmetry. No aphasia or slurred speech. Symmetrical strength, Sensation grossly intact. Alert and oriented X 4.  
 
_______________________________________________________________________ Care Plan discussed with: 
  Comments Patient y Family RN y   
Care Manager Consultant:     
_______________________________________________________________________ Expected  Disposition:  
Home with Family HH/PT/OT/RN Vs hospice SNF/LTC   
MICHELLE   
________________________________________________________________________ TOTAL TIME: 60 Minutes Critical Care Provided     Minutes non procedure based Comments  
 y Reviewed previous records  
>50% of visit spent in counseling and coordination of care y Discussion with patient and questions answered 
  
 
________________________________________________________________________ Signed: Macy Doan MD 
 
Procedures: see electronic medical records for all procedures/Xrays and details which were not copied into this note but were reviewed prior to creation of Plan. LAB DATA REVIEWED:   
Recent Results (from the past 24 hour(s)) EKG, 12 LEAD, INITIAL Collection Time: 07/19/18  9:08 PM  
Result Value Ref Range Ventricular Rate 59 BPM  
 Atrial Rate 59 BPM  
 P-R Interval 158 ms QRS Duration 120 ms  
 Q-T Interval 476 ms QTC Calculation (Bezet) 471 ms Calculated P Axis 102 degrees Calculated R Axis -99 degrees Calculated T Axis -76 degrees Diagnosis ** Suspect arm lead reversal, interpretation assumes no reversal 
Sinus bradycardia Pulmonary disease pattern Right bundle branch block When compared with ECG of 29-APR-2018 14:56, 
premature supraventricular complexes are no longer present T wave inversion no longer evident in Anterior leads CBC WITH AUTOMATED DIFF Collection Time: 07/19/18 10:22 PM  
Result Value Ref Range WBC 10.6 4.1 - 11.1 K/uL  
 RBC 3.22 (L) 4.10 - 5.70 M/uL  
 HGB 11.1 (L) 12.1 - 17.0 g/dL HCT 32.5 (L) 36.6 - 50.3 % .9 (H) 80.0 - 99.0 FL  
 MCH 34.5 (H) 26.0 - 34.0 PG  
 MCHC 34.2 30.0 - 36.5 g/dL  
 RDW 14.2 11.5 - 14.5 % PLATELET 584 510 - 688 K/uL MPV 10.1 8.9 - 12.9 FL  
 NRBC 0.0 0  WBC ABSOLUTE NRBC 0.00 0.00 - 0.01 K/uL NEUTROPHILS 80 (H) 32 - 75 % LYMPHOCYTES 9 (L) 12 - 49 % MONOCYTES 10 5 - 13 % EOSINOPHILS 0 0 - 7 % BASOPHILS 0 0 - 1 % IMMATURE GRANULOCYTES 1 (H) 0.0 - 0.5 % ABS. NEUTROPHILS 8.4 (H) 1.8 - 8.0 K/UL  
 ABS. LYMPHOCYTES 1.0 0.8 - 3.5 K/UL  
 ABS. MONOCYTES 1.0 0.0 - 1.0 K/UL  
 ABS. EOSINOPHILS 0.0 0.0 - 0.4 K/UL  
 ABS. BASOPHILS 0.0 0.0 - 0.1 K/UL  
 ABS. IMM. GRANS. 0.1 (H) 0.00 - 0.04 K/UL  
 DF AUTOMATED METABOLIC PANEL, COMPREHENSIVE Collection Time: 07/19/18 10:22 PM  
Result Value Ref Range Sodium 131 (L) 136 - 145 mmol/L Potassium 3.2 (L) 3.5 - 5.1 mmol/L Chloride 97 97 - 108 mmol/L  
 CO2 24 21 - 32 mmol/L Anion gap 10 5 - 15 mmol/L Glucose 145 (H) 65 - 100 mg/dL BUN 21 (H) 6 - 20 MG/DL Creatinine 1.23 0.70 - 1.30 MG/DL  
 BUN/Creatinine ratio 17 12 - 20 GFR est AA >60 >60 ml/min/1.73m2 GFR est non-AA 55 (L) >60 ml/min/1.73m2 Calcium 8.4 (L) 8.5 - 10.1 MG/DL Bilirubin, total 1.1 (H) 0.2 - 1.0 MG/DL  
 ALT (SGPT) 17 12 - 78 U/L  
 AST (SGOT) 13 (L) 15 - 37 U/L Alk. phosphatase 86 45 - 117 U/L Protein, total 6.0 (L) 6.4 - 8.2 g/dL Albumin 2.9 (L) 3.5 - 5.0 g/dL Globulin 3.1 2.0 - 4.0 g/dL A-G Ratio 0.9 (L) 1.1 - 2.2    
TROPONIN I Collection Time: 07/19/18 10:22 PM  
Result Value Ref Range Troponin-I, Qt. <0.05 <0.05 ng/mL

## 2018-07-20 NOTE — CONSULTS
Dictated: sounds most like convulsive syncope, w/u ordered    Consult  REFERRED BY:  Di Rodrigues MD    CHIEF COMPLAINT: Convulsive syncope      Subjective: Zoey Ocampo is a 80 y.o. right-handed  male we are asked to evaluate for a spell as a new patient to us, at the request of Dr. Corin Wheeler, for new problem of sudden passing out. The patient has pancreatic cancer, and had recent bowel obstruction, and just got home a week ago, and has been feeling nauseated, not eating or drinking much, and he sat down at the table and suddenly apparently after being lightheaded all day and not feeling good passed out for a few minutes and was staring off. He had no tonic-clonic activity no seizure activity noted. He had no tongue biting or incontinence. He is never had a history of seizure before. His head CT looks unremarkable and MRI scan is pending. He just has microvascular disease. His carotid Doppler showed no significant stenosis. Patient has no new focal weakness or sensory loss. Most likely this is a convulsive syncopal episode or vasovagal syncope. We will check the MRI scan to make sure there is no metastatic disease and check an EEG to make sure there is no seizures but that seems highly unlikely. We will also check orthostatic blood pressure checks. Patient otherwise neurologically intact now. See no evidence to suggest that this is a stroke. Patient denies any significant chest pain, palpitations or tachycardia, or shortness of breath. Past Medical History:   Diagnosis Date    Arthritis     Colon cancer ([address]     ECG abnormality 4/30/2018    GERD (gastroesophageal reflux disease)     High cholesterol     Hypertension     Lung cancer ([address]     Other ill-defined conditions(799.89) ~2000    arm tingling to ER, per pt states was not a stroke, but has been on plavix and aggrenox since.   currently on plavix    Pancreatic cancer ([address]     Sleep apnea with use of continuous positive airway pressure (CPAP)       Past Surgical History:   Procedure Laterality Date    HX CATARACT REMOVAL Bilateral 2008 and 2012    HX COLECTOMY      HX HERNIA REPAIR Left 04/28/2018    Reduced & repaired [facility] w/sigmoid resection & end colostomy    HX ORTHOPAEDIC Right     elbow     TOTAL KNEE ARTHROPLASTY Left 1999    TOTAL KNEE ARTHROPLASTY Right 2011     Family History   Problem Relation Age of Onset    Stroke Mother     Other Father      Brain tumor      Social History   Substance Use Topics    Smoking status: Never Smoker    Smokeless tobacco: Never Used    Alcohol use No         Current Facility-Administered Medications:     pantoprazole (PROTONIX) tablet 40 mg, 40 mg, Oral, ACB, Sung Lobato MD, 40 mg at 07/20/18 0728    nitroglycerin (NITROBID) 2 % ointment 1 Inch, 1 Inch, Topical, Q6H PRN, Jayesh Santana MD    sodium chloride (NS) flush 5-10 mL, 5-10 mL, IntraVENous, Q8H, Sung Lobato MD, 10 mL at 07/20/18 0728    sodium chloride (NS) flush 5-10 mL, 5-10 mL, IntraVENous, PRN, Jayesh Santana MD    acetaminophen (TYLENOL) tablet 650 mg, 650 mg, Oral, Q4H PRN **OR** acetaminophen (TYLENOL) solution 650 mg, 650 mg, Per NG tube, Q4H PRN **OR** acetaminophen (TYLENOL) suppository 650 mg, 650 mg, Rectal, Q4H PRN, Jayesh Santana MD    enoxaparin (LOVENOX) injection 40 mg, 40 mg, SubCUTAneous, DAILY, Sung Lobato MD, 40 mg at 07/20/18 1053        Allergies   Allergen Reactions    Aleve [Naproxen Sodium] Swelling     Throat swelling        Review of Systems:  A comprehensive review of systems was negative except for: Constitutional: positive for fatigue and malaise  Eyes: positive for visual disturbance  Cardiovascular: positive for near-syncope, syncope  Gastrointestinal: positive for dyspepsia, reflux symptoms, nausea, constipation and abdominal pain  Musculoskeletal: positive for myalgias, arthralgias and stiff joints  Neurological: positive for dizziness, weakness and Syncope  Behvioral/Psych: positive for anxiety and depression   Vitals:    07/20/18 1313 07/20/18 1330 07/20/18 1415 07/20/18 1446   BP: 129/70 123/50 116/54 136/66   Pulse: 66 65 62 64   Resp: 18 17 18 18   Temp:   97.4 °F (36.3 °C) 98 °F (36.7 °C)   SpO2:  95% 97% 97%   Weight:       Height:         Objective:     I      NEUROLOGICAL EXAM:    Appearance: The patient is well developed, well nourished, provides a coherent history and is in no acute distress. Mental Status: Oriented to time, place and person, and the president, cognitive function is normal and speech is fluent and no aphasia or dysarthria. Mood and affect appropriate. Cranial Nerves:   Intact visual fields. Fundi are benign. KEISHA, EOM's full, no nystagmus, no ptosis. Facial sensation is normal. Corneal reflexes are not tested. Facial movement is symmetric. Hearing is abnormal bilaterally. Palate is midline with normal sternocleidomastoid and trapezius muscles are normal. Tongue is midline. Neck without meningismus or bruits  Neck is supple without bruits   Motor:  5/5 strength in upper and lower proximal and distal muscles. Normal bulk and tone. No fasciculations. Reflexes:   Deep tendon reflexes 1+/4 and symmetrical.  No babinski or clonus present   Sensory:   Normal to touch, pinprick and vibration and temperature slightly decreased in both feet. DSS is intact   Gait:  Not tested gait since patient on stretcher going to room. Tremor:   No tremor noted. Cerebellar:  No cerebellar signs present on finger-nose-finger exam.   Neurovascular:  Normal heart sounds and regular rhythm, peripheral pulses decreased, and no carotid bruits. Assessment:       ICD-10-CM ICD-9-CM    1.  Transient cerebral ischemia, unspecified type G45.9 435.9      Active Problems:    HTN (hypertension) (5/3/2018)      Metastasis from pancreatic cancer [address] (7/6/2018)      Dysarthria (7/20/2018)      Hyperlipidemia (7/20/2018)      GERD (gastroesophageal reflux disease) (7/20/2018)      KHUSHBU (acute kidney injury) (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 intake, abdominal pain and pancreatic metastatic cancer, and hypotension. CT head negative, will check MRI scan and EEG, carotid Dopplers are unremarkable. We will check orthostatic blood pressures and continue fluid for hydration, patient appears to be dehydrated on labs  We will follow carefully with you, Dr. this is a TIA or stroke, placed on telemetry to monitor but doubt this is a cardiac arrhythmia.   We will follow closely, call if we can help    Signed By: Freddy Horn MD     July 20, 2018       CC: Zoe Cortés MD  FAX: [phone]

## 2018-07-20 NOTE — ROUTINE PROCESS

## 2018-07-20 NOTE — PROGRESS NOTES
Occupational Therapy  Chart reviewed. Referral received. Spoke with PT team who reported pt is orthostatic, currently at vascular and going to receive fluid bolus. Will defer evaluation at this time and continue to follow.  Job Gist, OT

## 2018-07-20 NOTE — PROGRESS NOTES
Physical Therapy    Received PT eval order. Chart reviewed. Pt now off floor to vascular per nurse. RN also states pt very orthostatic this am dropping from 117/58 sitting to 83/50 standing. Will be receiving fluids after he returns.  Will defer at this time and see pt when available and able to tolerate eval.    Umm Marie, PT

## 2018-07-20 NOTE — ED PROVIDER NOTES
EMERGENCY DEPARTMENT HISTORY AND PHYSICAL EXAM 
 
 
Date: 7/19/2018 Patient Name: Raine May History of Presenting Illness Chief Complaint Patient presents with  Altered mental status  
  short episode at home where pt blanked out, currently A&Ox4 History Provided By: Patient and Patient's Son HPI: Raine May, 80 y.o. male with PMHx significant for HTN, HCL, GERD, TIA, pancreatic CA w/ metastatic disease, presents via car to the ED with cc of 5 minute episode of  AMS and dysarthria associated with left sided facial droop just PTA. Son at bedside reports that patient was sitting at the table when he was \"staring off into space\" and was not responding to questions. After about a minute, he began answering questions with slurred speech. The symptoms resolved completely, but patient has no memory of the episode occurring. He is currently A&O x 4. Earlier today, he had some indigestion that improved with an antacid. He denies any other symptoms prior to the episode. Reports that he had a TIA several years ago associated with arm numbness. He denies fevers, chills, nausea, vomiting, chest pain, cough, abdominal pain, dysuria, HA, focal numbness, tingling or weakness. He ambulates with a cane or walker at baseline. He denies any recent falls. Chief Complaint: AMS Duration: 5 Minutes Timing:  Acute Location: NA 
Quality: NA Severity: N/A Modifying Factors: none Associated Symptoms: slurred speech There are no other complaints, changes, or physical findings at this time. PCP: Josephine Angeles MD 
 
Current Outpatient Prescriptions Medication Sig Dispense Refill  metoclopramide HCl (REGLAN) 5 mg tablet Take 5 mg by mouth four (4) times daily as needed for Nausea.  Omeprazole delayed release (PRILOSEC D/R) 20 mg tablet Take 20 mg by mouth daily.  loperamide (IMMODIUM) 2 mg tablet Take 2 mg by mouth daily as needed for Diarrhea.     
 acetaminophen (TYLENOL EXTRA STRENGTH) 500 mg tablet Take 500 mg by mouth daily as needed for Pain.  calcium carbonate (TUMS) 200 mg calcium (500 mg) chew Take 1 Tab by mouth four (4) times daily as needed ('Heart Burn/Acid Reflux').  lidocaine-prilocaine (EMLA) topical cream Apply  to affected area as needed for Pain. 30 g 0  
 amLODIPine (NORVASC) 10 mg tablet Take 1 Tab by mouth daily. 30 Tab 1  
 hydroCHLOROthiazide (HYDRODIURIL) 25 mg tablet Take 1 Tab by mouth daily. Indications: hypertension 30 Tab 1  
 labetalol (NORMODYNE) 200 mg tablet Take 1 Tab by mouth two (2) times a day. Indications: hypertension 60 Tab 1  pravastatin (PRAVACHOL) 40 mg tablet Take 40 mg by mouth nightly. Indications: HYPERCHOLESTEROLEMIA  thiamine (VITAMIN B-1) 100 mg tablet Take 100 mg by mouth daily.  vitamin E (AQUA GEMS) 400 unit capsule Take 800 Units by mouth daily.  Cholecalciferol, Vitamin D3, (VITAMIN D3) 1,000 unit cap Take 2 Tabs by mouth daily. Past History Past Medical History: 
Past Medical History:  
Diagnosis Date  Arthritis  Colon cancer ([facility] [address]  ECG abnormality 4/30/2018  GERD (gastroesophageal reflux disease)  High cholesterol  Hypertension  Lung cancer ([address]  Other ill-defined conditions(799.89) ~2000  
 arm tingling to ER, per pt states was not a stroke, but has been on plavix and aggrenox since. currently on plavix  Pancreatic cancer ([facility] [address]  Sleep apnea with use of continuous positive airway pressure (CPAP) Past Surgical History: 
Past Surgical History:  
Procedure Laterality Date  HX CATARACT REMOVAL Bilateral 2008 and 2012  HX COLECTOMY  HX HERNIA REPAIR Left 04/28/2018 Reduced & repaired [facility] w/sigmoid resection & end colostomy  HX ORTHOPAEDIC Right   
 elbow [place] ARTHROPLASTY Left 1999  TOTAL KNEE ARTHROPLASTY Right 2011 Family History: 
History reviewed. No pertinent family history.  
 
Social History: 
Social History Substance Use Topics  Smoking status: Never Smoker  Smokeless tobacco: Never Used  Alcohol use No  
 
 
Allergies: Allergies Allergen Reactions  Aleve [Naproxen Sodium] Swelling Throat swelling Review of Systems Review of Systems Constitutional: Negative for appetite change, chills, diaphoresis, fatigue and fever. HENT: Negative for congestion, sore throat and voice change. Eyes: Negative for photophobia and visual disturbance. Respiratory: Negative for cough, choking, chest tightness, shortness of breath and wheezing. Cardiovascular: Negative for chest pain, palpitations and leg swelling. Gastrointestinal: Negative for abdominal distention, abdominal pain, blood in stool, constipation, diarrhea, nausea and vomiting. Endocrine: Negative for polyuria. Genitourinary: Negative for difficulty urinating, dysuria, flank pain, frequency and hematuria. Musculoskeletal: Negative for back pain and joint swelling. Neurological: Positive for facial asymmetry and speech difficulty. Negative for dizziness, seizures, syncope, weakness, light-headedness, numbness and headaches. Physical Exam  
Physical Exam  
Constitutional: He is oriented to person, place, and time. He appears well-developed and well-nourished. No distress. HENT:  
Head: Normocephalic and atraumatic. Eyes: Conjunctivae and EOM are normal. Pupils are equal, round, and reactive to light. Neck: Normal range of motion. Neck supple. Cardiovascular: Normal rate, regular rhythm, normal heart sounds and intact distal pulses. Exam reveals no gallop and no friction rub. No murmur heard. Pulmonary/Chest: Effort normal and breath sounds normal. No respiratory distress. He has no wheezes. He has no rales. He exhibits no tenderness. Abdominal: Soft. Bowel sounds are normal. He exhibits no distension. There is no tenderness. Colostomy bag in place with brown stool present Musculoskeletal: Normal range of motion. He exhibits no edema, tenderness or deformity. Neurological: He is alert and oriented to person, place, and time. He displays normal reflexes. No cranial nerve deficit. He exhibits normal muscle tone. Coordination normal.  
5/5 strength in bilateral UE and LE. No dysmetria, no slurred speech, no pronator drift, HTS normal   
Skin: Skin is warm and dry. No rash noted. He is not diaphoretic. No pallor. Diagnostic Study Results Labs - Recent Results (from the past 12 hour(s)) EKG, 12 LEAD, INITIAL Collection Time: 07/19/18  9:08 PM  
Result Value Ref Range Ventricular Rate 59 BPM  
 Atrial Rate 59 BPM  
 P-R Interval 158 ms QRS Duration 120 ms  
 Q-T Interval 476 ms QTC Calculation (Bezet) 471 ms Calculated P Axis 102 degrees Calculated R Axis -99 degrees Calculated T Axis -76 degrees Diagnosis ** Suspect arm lead reversal, interpretation assumes no reversal 
Sinus bradycardia Pulmonary disease pattern Right bundle branch block When compared with ECG of 29-APR-2018 14:56, 
premature supraventricular complexes are no longer present T wave inversion no longer evident in Anterior leads CBC WITH AUTOMATED DIFF Collection Time: 07/19/18 10:22 PM  
Result Value Ref Range WBC 10.6 4.1 - 11.1 K/uL  
 RBC 3.22 (L) 4.10 - 5.70 M/uL  
 HGB 11.1 (L) 12.1 - 17.0 g/dL HCT 32.5 (L) 36.6 - 50.3 % .9 (H) 80.0 - 99.0 FL  
 MCH 34.5 (H) 26.0 - 34.0 PG  
 MCHC 34.2 30.0 - 36.5 g/dL  
 RDW 14.2 11.5 - 14.5 % PLATELET 527 119 - 626 K/uL MPV 10.1 8.9 - 12.9 FL  
 NRBC 0.0 0  WBC ABSOLUTE NRBC 0.00 0.00 - 0.01 K/uL NEUTROPHILS 80 (H) 32 - 75 % LYMPHOCYTES 9 (L) 12 - 49 % MONOCYTES 10 5 - 13 % EOSINOPHILS 0 0 - 7 % BASOPHILS 0 0 - 1 % IMMATURE GRANULOCYTES 1 (H) 0.0 - 0.5 % ABS. NEUTROPHILS 8.4 (H) 1.8 - 8.0 K/UL  
 ABS. LYMPHOCYTES 1.0 0.8 - 3.5 K/UL  
 ABS. MONOCYTES 1.0 0.0 - 1.0 K/UL  
 ABS.  EOSINOPHILS 0.0 0.0 - 0.4 K/UL  
 ABS. BASOPHILS 0.0 0.0 - 0.1 K/UL  
 ABS. IMM. GRANS. 0.1 (H) 0.00 - 0.04 K/UL  
 DF AUTOMATED METABOLIC PANEL, COMPREHENSIVE Collection Time: 07/19/18 10:22 PM  
Result Value Ref Range Sodium 131 (L) 136 - 145 mmol/L Potassium 3.2 (L) 3.5 - 5.1 mmol/L Chloride 97 97 - 108 mmol/L  
 CO2 24 21 - 32 mmol/L Anion gap 10 5 - 15 mmol/L Glucose 145 (H) 65 - 100 mg/dL BUN 21 (H) 6 - 20 MG/DL Creatinine 1.23 0.70 - 1.30 MG/DL  
 BUN/Creatinine ratio 17 12 - 20 GFR est AA >60 >60 ml/min/1.73m2 GFR est non-AA 55 (L) >60 ml/min/1.73m2 Calcium 8.4 (L) 8.5 - 10.1 MG/DL Bilirubin, total 1.1 (H) 0.2 - 1.0 MG/DL  
 ALT (SGPT) 17 12 - 78 U/L  
 AST (SGOT) 13 (L) 15 - 37 U/L Alk. phosphatase 86 45 - 117 U/L Protein, total 6.0 (L) 6.4 - 8.2 g/dL Albumin 2.9 (L) 3.5 - 5.0 g/dL Globulin 3.1 2.0 - 4.0 g/dL A-G Ratio 0.9 (L) 1.1 - 2.2    
TROPONIN I Collection Time: 07/19/18 10:22 PM  
Result Value Ref Range Troponin-I, Qt. <0.05 <0.05 ng/mL Radiologic Studies -  
CT HEAD WO CONT Final Result CT Results  (Last 48 hours) 07/19/18 2146  CT HEAD WO CONT Final result Impression:  IMPRESSION: No acute findings. Nonspecific white matter changes most common seen  
with chronic small vessel ischemic and/or senescent change. Intracranial  
atherosclerosis. Narrative:  EXAM:  CT HEAD WO CONT INDICATION:   Transient ischemic attack/altered mental status with short episode  
where the patient \"blanked out\". COMPARISON: CT 1/17/2017. CONTRAST:  None. TECHNIQUE: Unenhanced CT of the head was performed using 5 mm images. Brain and  
bone windows were generated. CT dose reduction was achieved through use of a  
standardized protocol tailored for this examination and automatic exposure  
control for dose modulation.     
   
FINDINGS:  
The ventricles and sulci are normal in size, shape and configuration and  
midline. There is mild periventricular white matter hypodensity. There is no  
intracranial hemorrhage, extra-axial collection, mass, mass effect or midline  
shift. The basilar cisterns are open. No acute infarct is identified. Atherosclerotic calcifications affect the carotid siphons and vertebrobasilar  
system. The bone windows demonstrate no abnormalities. The visualized portions  
of the paranasal sinuses and mastoid air cells are clear. CXR Results  (Last 48 hours) None Medical Decision Making I am the first provider for this patient. I reviewed the vital signs, available nursing notes, past medical history, past surgical history, family history and social history. Vital Signs-Reviewed the patient's vital signs. Patient Vitals for the past 12 hrs: 
 Temp Pulse Resp BP SpO2  
07/19/18 2200 - (!) 56 18 112/58 97 % 07/19/18 2153 - - - 123/57 -  
07/19/18 2130 - (!) 58 18 118/58 99 % 07/19/18 2115 - (!) 59 17 125/66 99 % 07/19/18 2107 - 60 10 - 98 % 07/19/18 2106 98.7 °F (37.1 °C) (!) 58 15 121/60 98 % 07/19/18 2103 - - - 121/60 -  
 
 
Pulse Oximetry Analysis - 99% on RA Cardiac Monitor:  
Rate: 64 bpm 
Rhythm: Normal Sinus Rhythm EKG interpretation: (Preliminary) Rhythm: sinus bradycardia; and regular . Rate (approx.): 59; Axis: left axis deviation; WI interval: normal; QRS interval:prolonged ; ST/T wave: non-specific changes; Other findings: unchanged from previous ekg. Right bundle branch block. Written by Aroldo Black MD  
 
Records Reviewed: Nursing Notes, Old Medical Records, Previous electrocardiograms, Previous Radiology Studies and Previous Laboratory Studies Provider Notes (Medical Decision Making): DDX: TIA, UTI, electrolyte abnormality, dehydration, ICH, subdural  
 
Afebrile, hemodynamically stable. HR in high 50s, low 60s with patient on beta blocker.  He has no focal neurological deficits on exam and is back to baseline. Will obtain head CT to evaluate for acute pathology. ED Course:  
Initial assessment performed. The patients presenting problems have been discussed, and they are in agreement with the care plan formulated and outlined with them. I have encouraged them to ask questions as they arise throughout their visit. 23:00 Discussed imaging results and lab work with patient and family at bedside. Patient had been taken off his plavix prior to his SBO surgery and was never placed back on. Discussed work-up for the TIA and possible admission. Patient stated that he wanted to be admitted and complete the work-up. Consult Note: 
11:13 PM 
Aracelis Thompson MD spoke with Dr. Joya Hathaway, Specialty: Hospitalist 
Discussed pt's hx, disposition, and available diagnostic and imaging results. Reviewed care plans. Consultant agrees with plans as outlined. Admission for TIA workup. Critical Care Time:  
0 Disposition: 
Admission PLAN: 
1. Current Discharge Medication List  
  
 
2. Follow-up Information None Return to ED if worse Diagnosis Clinical Impression: 1. Transient cerebral ischemia, unspecified type Attestations: 
 
 
 
 
Attending Attestation: 
 
I personally performed a history and physical examination of the patient and discussed the management with the resident. I have found the following on physical exam: 
 
General: NAD HEENT: EOMI, non-icteric sclera Chest: RRR, no m/r/g Lungs: CTAB Abd: nt, nd, soft, +bs. Ostomy bag in LLQ draining brown liquid stool Ext: no peripheral edema, no cyanosis, +2 peripheral pulses Skin: no rashes or lesions Neuro: no focal deficits I have reviewed the resident's note and agree with the resident's findings, including all diagnostic interpretations, treatment and plan of care, except as documented below. I was present during the key portions of separately billed procedures.    
Suzy Cárdenas DO

## 2018-07-21 NOTE — PROGRESS NOTES
Physical Therapy Goals  Initiated 7/21/2018  1. Patient will move from supine to sit and sit to supine , scoot up and down and roll side to side in bed with independence within 7 day(s). 2. Patient will transfer from bed to chair and chair to bed with modified independence using the least restrictive device within 7 day(s). 3. Patient will perform sit to stand with modified independence within 7 day(s). 4. Patient will ambulate with modified independence for 150 feet with the least restrictive device within 7 day(s). 5. Patient will ascend/descend 3 stairs with one sided handrail(s) with modified independence within 7 day(s). physical Therapy EVALUATION  Patient: Robert Kumari (24 y.o. male)  Date: 7/21/2018  Primary Diagnosis: Dysarthria        Precautions:   DNR    ASSESSMENT :  Based on the objective data described below, the patient presents with decreased strength, decreased functional mobility, mildly unsteady gait, and orthostatic hypotension following admission for dysarthria. PTA patient lives with his son although has 24/7 support from other caregivers. He ambulates with SPC although recently has been using the RW since feeling weaker. He is independent with ADLs. He denies any falls. Currently, patient received resting in bed, agreeable to PT. Patient required CGA for all aspects of functional mobility this date. Patient is asymptomatic although patient is orthostatic from supine to sit (see doc flow). Patient stood with CGA and RW and ambulated to the bathroom with VSS throughout session. Patient with mildly unsteady gait with decreased endurance, although no LOB or path deviations noted. Patient tolerated standing x 3 minutes for ADL tasks and returned to the chair with CGA at the conclusion of PT evaluation. Patient will likely benefit from resumption of [facility] PT services with his 24/7 support at discharge.      Patient will benefit from skilled intervention to address the above impairments. Patients rehabilitation potential is considered to be Good  Factors which may influence rehabilitation potential include:   []         None noted  []         Mental ability/status  [x]         Medical condition  []         Home/family situation and support systems  []         Safety awareness  []         Pain tolerance/management  []         Other:      PLAN :  Recommendations and Planned Interventions:  [x]           Bed Mobility Training             []    Neuromuscular Re-Education  [x]           Transfer Training                   []    Orthotic/Prosthetic Training  [x]           Gait Training                         []    Modalities  [x]           Therapeutic Exercises           []    Edema Management/Control  [x]           Therapeutic Activities            [x]    Patient and Family Training/Education  []           Other (comment):    Frequency/Duration: Patient will be followed by physical therapy  4 times a week to address goals. Discharge Recommendations: Home Health  Further Equipment Recommendations for Discharge: owns DME     SUBJECTIVE:   Patient stated I feel fine, just weak.     OBJECTIVE DATA SUMMARY:   HISTORY:    Past Medical History:   Diagnosis Date    Arthritis     [facility] ([facility] [address]     ECG abnormality 4/30/2018    GERD (gastroesophageal reflux disease)     High cholesterol     Hypertension     Lung cancer ([facility] [address]     Other ill-defined conditions(799.89) ~2000    arm tingling to ER, per pt states was not a stroke, but has been on plavix and aggrenox since.   currently on plavix    Pancreatic cancer ([facility] [address]     Sleep apnea with use of continuous positive airway pressure (CPAP)      Past Surgical History:   Procedure Laterality Date    HX CATARACT REMOVAL Bilateral 2008 and 2012    HX COLECTOMY      HX HERNIA REPAIR Left 04/28/2018    Reduced & repaired [facility] w/sigmoid resection & end colostomy    HX ORTHOPAEDIC Right     elbow     TOTAL KNEE ARTHROPLASTY Left 1999    TOTAL KNEE ARTHROPLASTY Right 2011     Prior Level of Function/Home Situation:  patient lives with his son although has 24/7 support from other caregivers. He ambulates with SPC although recently has been using the RW since feeling weaker. He is independent with ADLs. He denies any falls. Personal factors and/or comorbidities impacting plan of care: cancer    Home Situation  Home Environment: Private residence  # Steps to Enter: 3  Rails to Enter: Yes  One/Two Story Residence: One story  Living Alone: No  Support Systems: Child(simran)  Patient Expects to be Discharged to[de-identified] Unknown  Current DME Used/Available at Home: Grab bars, Walker, rolling  Tub or Shower Type: Shower (sponge bathes )    EXAMINATION/PRESENTATION/DECISION MAKING:   Critical Behavior:  Neurologic State: Alert  Orientation Level: Oriented X4  Cognition: Follows commands     Hearing: Auditory  Auditory Impairment: Hard of hearing, bilateral  Skin:    Edema:   Range Of Motion:  AROM: Generally decreased, functional           PROM: Within functional limits           Strength:    Strength: Generally decreased, functional                    Tone & Sensation:   Tone: Normal              Sensation: Intact               Coordination:  Coordination: Within functional limits  Vision:      Functional Mobility:  Bed Mobility:  Rolling: Contact guard assistance  Supine to Sit: Contact guard assistance     Scooting: Contact guard assistance  Transfers:  Sit to Stand: Contact guard assistance  Stand to Sit: Contact guard assistance        Bed to Chair: Contact guard assistance              Balance:   Sitting: Intact; Without support  Standing: Impaired; With support  Standing - Static: Constant support;Good  Standing - Dynamic : Good  Ambulation/Gait Training:  Distance (ft): 30 Feet (ft)  Assistive Device: Gait belt;Walker, rolling  Ambulation - Level of Assistance: Contact guard assistance     Gait Description (WDL): Exceptions to WDL  Gait Abnormalities: Decreased step clearance        Base of Support: Widened     Speed/Mckenzie: Pace decreased (<100 feet/min)  Step Length: Right shortened;Left shortened                     Stairs: Therapeutic Exercises:       Functional Measure:  Barthel Index:    Bathin  Bladder: 10  Bowels: 10  Groomin  Dressing: 10  Feeding: 10  Mobility: 10  Stairs: 5  Toilet Use: 5  Transfer (Bed to Chair and Back): 10  Total: 75       Barthel and G-code impairment scale:  Percentage of impairment CH  0% CI  1-19% CJ  20-39% CK  40-59% CL  60-79% CM  80-99% CN  100%   Barthel Score 0-100 100 99-80 79-60 59-40 20-39 1-19   0   Barthel Score 0-20 20 17-19 13-16 9-12 5-8 1-4 0      The Barthel ADL Index: Guidelines  1. The index should be used as a record of what a patient does, not as a record of what a patient could do. 2. The main aim is to establish degree of independence from any help, physical or verbal, however minor and for whatever reason. 3. The need for supervision renders the patient not independent. 4. A patient's performance should be established using the best available evidence. Asking the patient, friends/relatives and nurses are the usual sources, but direct observation and common sense are also important. However direct testing is not needed. 5. Usually the patient's performance over the preceding 24-48 hours is important, but occasionally longer periods will be relevant. 6. Middle categories imply that the patient supplies over 50 per cent of the effort. 7. Use of aids to be independent is allowed. Elkin Deleon., Barthel, D.W. (1967). Functional evaluation: the Barthel Index. [facility] (14)2. Sandria Cogan [place], J.J.M.F, Virginia Wallace, Mimi Torres, Jase, [address] [place] [address] (). Measuring the change indisability after inpatient rehabilitation; comparison of the responsiveness of the Barthel Index and Functional [place] Measure.  Journal of Neurology, Neurosurgery, and Psychiatry, 66(4), [phone]. SERGE Napoles, ALEA Avendano, & Laura Glover MCamA. (2004.) Assessment of post-stroke quality of life in cost-effectiveness studies: The usefulness of the Barthel Index and the EuroQoL-5D. Quality of Life Research, 13, 934-44         G codes: In compliance with CMSs Claims Based Outcome Reporting, the following G-code set was chosen for this patient based on their primary functional limitation being treated: The outcome measure chosen to determine the severity of the functional limitation was the Barthel with a score of 75/100 which was correlated with the impairment scale. ? Mobility - Walking and Moving Around:     - CURRENT STATUS: CJ - 20%-39% impaired, limited or restricted    - GOAL STATUS: CI - 1%-19% impaired, limited or restricted    - D/C STATUS:  ---------------To be determined---------------      Physical Therapy Evaluation Charge Determination   History Examination Presentation Decision-Making   MEDIUM  Complexity : 1-2 comorbidities / personal factors will impact the outcome/ POC  MEDIUM Complexity : 3 Standardized tests and measures addressing body structure, function, activity limitation and / or participation in recreation  MEDIUM Complexity : Evolving with changing characteristics  Other outcome measures Barthel   MEDIUM      Based on the above components, the patient evaluation is determined to be of the following complexity level: MEDIUM    Pain:                    Activity Tolerance:   Good, limited by orthostatic hypotension. Asymptomatic. Please refer to the flowsheet for vital signs taken during this treatment.   After treatment:   [x]         Patient left in no apparent distress sitting up in chair  []         Patient left in no apparent distress in bed  [x]         Call bell left within reach  [x]         Nursing notified  []         Caregiver present  []         Bed alarm activated    COMMUNICATION/EDUCATION:   The patients plan of care was discussed with: Occupational Therapist, Registered Nurse and . [x]         Fall prevention education was provided and the patient/caregiver indicated understanding. [x]         Patient/family have participated as able in goal setting and plan of care. [x]         Patient/family agree to work toward stated goals and plan of care. []         Patient understands intent and goals of therapy, but is neutral about his/her participation. []         Patient is unable to participate in goal setting and plan of care.     Thank you for this referral.  Nestor Kiser, PT, DPT   Time Calculation: 23 mins

## 2018-07-21 NOTE — CONSULTS
Consult  CHIEF COMPLAINT: Convulsive syncope      Subjective: Robert Kumari is a 80 y.o. right-handed  male we are asked to evaluate for a spell as a new patient to us, at the request of Dr. Ampaor Szymanski, for new problem of sudden passing out. The patient has pancreatic cancer, and had recent bowel obstruction, and just got home a week ago, and has been feeling nauseated, not eating or drinking much, and he sat down at the table and suddenly apparently after being lightheaded all day and not feeling good passed out for a few minutes and was staring off. He had no tonic-clonic activity no seizure activity noted. He had no tongue biting or incontinence. He is never had a history of seizure before. His head CT looks unremarkable and MRI scan is NORMAL    Past Medical History:   Diagnosis Date    Arthritis     [facility] [address]     ECG abnormality 4/30/2018    GERD (gastroesophageal reflux disease)     High cholesterol     Hypertension     Lung cancer ([address]     Other ill-defined conditions(799.89) ~2000    arm tingling to ER, per pt states was not a stroke, but has been on plavix and aggrenox since.   currently on plavix    Pancreatic cancer ([facility] [address]     Sleep apnea with use of continuous positive airway pressure (CPAP)       Past Surgical History:   Procedure Laterality Date    HX CATARACT REMOVAL Bilateral 2008 and 2012    HX COLECTOMY      HX HERNIA REPAIR Left 04/28/2018    Reduced & repaired [facility] w/sigmoid resection & end colostomy    HX ORTHOPAEDIC Right     elbow     TOTAL KNEE ARTHROPLASTY Left 1999    TOTAL KNEE ARTHROPLASTY Right 2011     Family History   Problem Relation Age of Onset    Stroke Mother     Other Father      Brain tumor      Social History   Substance Use Topics    Smoking status: Never Smoker    Smokeless tobacco: Never Used    Alcohol use No         Current Facility-Administered Medications:     pantoprazole (PROTONIX) tablet 40 mg, 40 mg, Oral, ACB, Sung MD Cheryle, 40 mg at 07/21/18 1006    nitroglycerin (NITROBID) 2 % ointment 1 Inch, 1 Inch, Topical, Q6H PRN, Radha Honeycutt MD    sodium chloride (NS) flush 5-10 mL, 5-10 mL, IntraVENous, Q8H, Sung Lobato MD, 10 mL at 07/21/18 0656    sodium chloride (NS) flush 5-10 mL, 5-10 mL, IntraVENous, PRN, Radha Honeycutt MD    acetaminophen (TYLENOL) tablet 650 mg, 650 mg, Oral, Q4H PRN **OR** acetaminophen (TYLENOL) solution 650 mg, 650 mg, Per NG tube, Q4H PRN **OR** acetaminophen (TYLENOL) suppository 650 mg, 650 mg, Rectal, Q4H PRN, Radha Honeycutt MD    enoxaparin (LOVENOX) injection 40 mg, 40 mg, SubCUTAneous, DAILY, Sung Lobato MD, 40 mg at 07/21/18 1006    calcium carbonate (TUMS) chewable tablet 200 mg [elemental], 200 mg, Oral, QID PRN, Yessi Daniel MD, 200 mg at 07/21/18 0141    metoclopramide HCl (REGLAN) tablet 5 mg, 5 mg, Oral, TID WITH MEALS, Yessi Daniel MD, 5 mg at 07/21/18 1006        Allergies   Allergen Reactions    Aleve [Naproxen Sodium] Swelling     Throat swelling        Review of Systems:  A comprehensive review of systems was negative except for: Constitutional: positive for fatigue and malaise  Eyes: positive for visual disturbance  Cardiovascular: positive for near-syncope, syncope  Gastrointestinal: positive for dyspepsia, reflux symptoms, nausea, constipation and abdominal pain  Musculoskeletal: positive for myalgias, arthralgias and stiff joints  Neurological: positive for dizziness, weakness and Syncope  Behvioral/Psych: positive for anxiety and depression   Vitals:    07/21/18 0856 07/21/18 0901 07/21/18 0902 07/21/18 0908   BP: 119/67 114/64 139/71 144/67   Pulse: 79 92 77    Resp:       Temp:       SpO2:  98%     Weight:       Height:         Objective:     I      NEUROLOGICAL EXAM:    Appearance:  Awake and alert     Mental Status: Awake and alert   Cranial Nerves:   NL   Motor:  NL    Tremor:   None          Assessment:       ICD-10-CM ICD-9-CM    1.  Transient cerebral ischemia, unspecified type G45.9 435.9    2. Altered mental status, unspecified altered mental status type R41.82 780.97    3. Bilateral carotid artery stenosis I65.23 433.10      433.30    4. Cerebral microvascular disease I67.9 437.9    5. Convulsive syncope R55 780.2      780.39    6. Dysarthria R47.1 784.51    7. Primary pancreatic cancer with metastasis to other site ([facility] [address] C25.9 157.9    8. Metastasis from pancreatic cancer (HCC) C79.9 199.1     C25.9 157.9      Active Problems:    HTN (hypertension) (5/3/2018)      Metastasis from pancreatic cancer ([facility] [address] (7/6/2018)      Dysarthria (7/20/2018)      Hyperlipidemia (7/20/2018)      GERD (gastroesophageal reflux disease) (7/20/2018)      KHUSHBU (acute kidney injury) ([facility] [address] (7/20/2018)      Hyponatremia (7/20/2018)      Convulsive syncope (7/20/2018)      Altered mental status, unspecified (7/20/2018)      Bilateral carotid artery stenosis (7/20/2018)      Cerebral microvascular disease (7/20/2018)        Plan:     Patient with syncopal episode most likely, probably related to poor p.o. intake, abdominal pain and pancreatic metastatic cancer, and hypotension. CT head negative, MRI BRAIN NL, carotid Dopplers are unremarkable.   EEG pending, I suspect this was convulsive syncope    Signed By: Adelina Pleitez MD     July 21, 2018

## 2018-07-21 NOTE — PROGRESS NOTES
Hospitalist Progress Note NAME: Gina Antoine :  1926 MRN:  [id number] Assessment / Plan: 
Likely convulsive syncope due to orthostatic hypotension with dehydration/poor po intake in setting of metastatic pancreatic ca:  +dysarthria and slurred speech after episode of starring for few minutes - CT head with no acute findings. Nonspecific white matter changes most common seen with chronic small vessel ischemic and/or senescent change. Intracranial atherosclerosis. - MRI brain no evidence of acute process. MRA brain with no flow limiting stenosis. - carotid dopplers with bilateral <50% stenosis of the internal carotid arteries - echo  with EF 55-60%. There were no regional wall motion abnormalities. - EEG pending, should be completed today - appreciate neurology assistance 
- d/c IV fluids, con't to encourage po intake as able Mild KHUSHBU (resolved) and hyponatremia: due to dehydration and HCTZ 
- stopped HCTZ Hypophosphatemia: 
- IV replacement today 
- check electrolytes in AM 
Metastatic pancreatic cancer with mets to lungs and peritoneum: 
- appreciate oncology assistance 
- has received palliative chemo x1. Unclear if he will be candidate for further therapy given multiple admissions and apparent declining health.  
Orthostatic hypotension in setting of benign HTN: improving blood pressure 
- holding norvasc, labetalol, HCTZ with orthostasis 
- con't prn nitropaste Recent small bowel obstruction: admitted - for this issue - Pt again having larger volume ostomy output and nausea 
- will get repeat CT scan 
- consider surgical consult pending result Hyperlipidemia:  D/c statin due to very advanced age and poor life expectancy GERD (gastroesophageal reflux disease): con't PPI Severe malnutrition: has lost ~15 lbs (7.5%) in the past 3 months due to pancreatic cancer and incarcerated left inguinal hernia with SBO 
- nutrition consulted 
- supplements added Hypokalemia, resolved 
   
Code Status: DNR Surrogate Decision Maker: son  
DVT Prophylaxis: Lovenox Subjective: Chief Complaint / Reason for Physician Visit \"I'm not sure what I need to do\". Mild confusion understanding information regarding his medical issues and healthcare instructions. Family at bedside. Discussed with RN events overnight. Review of Systems: 
Symptom Y/N Comments  Symptom Y/N Comments Fever/Chills n   Chest Pain n   
Poor Appetite n   Edema n   
Cough n   Abdominal Pain n   
Sputum n   Joint Pain SOB/BUI n   Pruritis/Rash Nausea/vomit    Tolerating PT/OT Diarrhea    Tolerating Diet Constipation    Other Could NOT obtain due to:   
 
Objective: VITALS:  
Last 24hrs VS reviewed since prior progress note. Most recent are: 
Patient Vitals for the past 24 hrs: 
 Temp Pulse Resp BP SpO2  
07/21/18 0908 - - - 144/67 -  
07/21/18 0902 - 77 - 139/71 -  
07/21/18 0901 - 92 - 114/64 98 %  
07/21/18 0856 - 79 - 119/67 -  
07/21/18 0855 - 70 - 145/62 -  
07/21/18 0742 97.8 °F (36.6 °C) 70 18 125/49 98 %  
07/21/18 0354 97.8 °F (36.6 °C) 69 18 145/66 97 %  
07/20/18 2306 97.8 °F (36.6 °C) 69 18 132/60 96 %  
07/20/18 1928 97.6 °F (36.4 °C) 68 18 123/60 96 %  
07/20/18 1446 98 °F (36.7 °C) 64 18 136/66 97 %  
07/20/18 1415 97.4 °F (36.3 °C) 62 18 116/54 97 %  
07/20/18 1330 - 65 17 123/50 95 %  
07/20/18 1313 - 66 18 129/70 -  
07/20/18 1300 - 69 27 129/60 93 % 07/20/18 1200 - 65 21 119/57 97 %  
07/20/18 1130 - (!) 59 16 109/58 95 %  
07/20/18 1100 - 67 23 132/61 98 % Intake/Output Summary (Last 24 hours) at 07/21/18 4269 Last data filed at 07/21/18 8641 Gross per 24 hour Intake                0 ml Output              450 ml Net             -450 ml PHYSICAL EXAM: 
General: WD, WN. Alert, cooperative, no acute distress   
EENT:  EOMI. Anicteric sclerae. MMM Resp:  CTA bilaterally, no wheezing or rales. No accessory muscle use CV:  Regular  rhythm,  pedal edema GI:  Soft, Non distended, Non tender.  +Bowel sounds Neurologic:  Alert and oriented X 3, normal speech, Psych:   Poor insight. Not anxious nor agitated Skin:  Pale, No rashes. No jaundice Reviewed most current lab test results and cultures  YES Reviewed most current radiology test results   YES Review and summation of old records today    NO Reviewed patient's current orders and MAR    YES 
PMH/SH reviewed - no change compared to H&P 
________________________________________________________________________ Care Plan discussed with: 
  Comments Patient x Family  x Son and granddaughter at bedside RN x Care Manager Consultant  x neuro Multidiciplinary team rounds were held today with , nursing, pharmacist and clinical coordinator. Patient's plan of care was discussed; medications were reviewed and discharge planning was addressed. ________________________________________________________________________ Total NON critical care TIME:  30 Minutes Total CRITICAL CARE TIME Spent:   Minutes non procedure based Comments >50% of visit spent in counseling and coordination of care x   
________________________________________________________________________ Romy Hurley MD  
 
Procedures: see electronic medical records for all procedures/Xrays and details which were not copied into this note but were reviewed prior to creation of Plan. LABS: 
I reviewed today's most current labs and imaging studies. Pertinent labs include: 
Recent Labs  
   07/21/18 
 0400  07/20/18 
 0414  07/19/18 
 2222 WBC  6.8  8.8  10.6 HGB  10.5*  10.5*  11.1*  
HCT  31.5*  30.1*  32.5*  
PLT  350  338  365 Recent Labs  
   07/21/18 
 0400  07/20/18 
 0414  07/19/18 
 2222 NA  138  133*  131*  
K  3.7  3.2*  3.2*  
CL  106  99  97 CO2  25  25  24 GLU  139*  129*  145* BUN  17  18  21* CREA  1.12  1.12  1.23  
CA  8.3*  8.4*  8.4* MG  1.6 --    --   
PHOS  2.5*   --    --   
ALB   --   2.7*  2.9* TBILI   --   1.2*  1.1*  
SGOT   --   10*  13* ALT   --   15  17 Signed: Brian Mcneal MD

## 2018-07-21 NOTE — PROGRESS NOTES
Problem: Self Care Deficits Care Plan (Adult)  Goal: *Acute Goals and Plan of Care (Insert Text)  Occupational Therapy Goals  Initiated 7/21/2018  1. Patient will perform standing ADLs at sink with least restrictive DME with modified independence within 7 day(s). 2.  Patient will perform upper body dressing and lower body dressing with modified independence within 7 day(s). 3.  Patient will perform bathing with modified independence within 7 day(s). 4.  Patient will perform toilet transfers with modified independence using least restrictive DME within 7 day(s). 5.  Patient will perform all aspects of toileting with modified independence within 7 day(s). Occupational Therapy EVALUATION  Patient: Dev Quinonez (67 y.o. male)  Date: 7/21/2018  Primary Diagnosis: Dysarthria        Precautions:   DNR    ASSESSMENT :  Based on the objective data described below, the patient presents with generalized weakness, slight impulsivity with decreased safety awareness with use of RW and need for CGA to min A for ADLs at this time. Pt admitted for AMS, syncope and dysarthria. MRI negative for acute process. Pt familiar to this Lianna Martini from previous admissions with most recent occurring 7/10/18 for SBO. Since discharge, pt has been home, receiving [place] and has care aides M-F 8 hours a day to assist with IADLs. Pt reports mod I for ADLs at home with use of either RW or SPC. This date, pt required CGA for bed mobility with most notable drop in BP from supine to sit. However, pt asymptomatic and BP remained stable throughout remainder of session. Pt required CGA with RW for functional mobility to bathroom, SBA for standing ADL at sink x 4 minutes. Would benefit from 1-2 more OT sessions but suspect pt to progress well. Recommend discharge home with continuation of [place] services and assistance from care aides for IADLs. Patient will benefit from skilled intervention to address the above impairments.   Patients rehabilitation potential is considered to be Good  Factors which may influence rehabilitation potential include:   []             None noted  []             Mental ability/status  [x]             Medical condition  []             Home/family situation and support systems  []             Safety awareness  []             Pain tolerance/management  []             Other:      PLAN :  Recommendations and Planned Interventions:  [x]               Self Care Training                  [x]        Therapeutic Activities  [x]               Functional Mobility Training    []        Cognitive Retraining  [x]               Therapeutic Exercises           [x]        Endurance Activities  [x]               Balance Training                   []        Neuromuscular Re-Education  []               Visual/Perceptual Training     [x]   Home Safety Training  [x]               Patient Education                 [x]        Family Training/Education  []               Other (comment):    Frequency/Duration: Patient will be followed by occupational therapy 3 times a week to address goals. Discharge Recommendations: continue [facility]  Further Equipment Recommendations for Discharge: none     SUBJECTIVE:   Patient stated Nithya Her got clogged up and had to come here a couple weeks ago.     OBJECTIVE DATA SUMMARY:   HISTORY:   Past Medical History:   Diagnosis Date    Arthritis     [facility] ([facility] [address]     ECG abnormality 4/30/2018    GERD (gastroesophageal reflux disease)     High cholesterol     Hypertension     Lung cancer ([facility] [address]     Other ill-defined conditions(799.89) ~2000    arm tingling to ER, per pt states was not a stroke, but has been on plavix and aggrenox since.   currently on plavix    Pancreatic cancer ([facility] [address]     Sleep apnea with use of continuous positive airway pressure (CPAP)      Past Surgical History:   Procedure Laterality Date    HX CATARACT REMOVAL Bilateral 2008 and 2012    HX COLECTOMY      HX HERNIA REPAIR Left 04/28/2018 Reduced & repaired LIH w/sigmoid resection & end colostomy    HX ORTHOPAEDIC Right     elbow     TOTAL KNEE ARTHROPLASTY Left 1999    TOTAL KNEE ARTHROPLASTY Right 2011       Prior Level of Function/Environment/Context: Pt familiar to this author from previous admissions with most recent occurring 7/10/18 for SBO. Since discharge, pt has been home, reciving HH and has care aides M-F 8 hours a day to assist with IADLs. Pt reports mod I for ADLs at home. Occupations in which the patient is/was successful, what are the barriers preventing that success:   Performance Patterns (routines, roles, habits, and rituals):   Personal Interests and/or values:   Expanded or extensive additional review of patient history: hernia repair 5/2018, SBO 7/2018. metastic pancreatic CA, hx TIA    Home Situation  Home Environment: Private residence  # Steps to Enter: 3  Rails to Enter: Yes  One/Two Story Residence: One story  Living Alone: No  Support Systems: Child(simran)  Patient Expects to be Discharged to[de-identified] Unknown  Current DME Used/Available at Home: Grab bars, Walker, rolling  Tub or Shower Type: Shower (sponge bathes )    Hand dominance: Right    EXAMINATION OF PERFORMANCE DEFICITS:  Cognitive/Behavioral Status:              Perseveration: No perseveration noted  Safety/Judgement: Awareness of environment    Skin: intact    Edema: none noted    Hearing: Auditory  Auditory Impairment: Hard of hearing, bilateral    Vision/Perceptual:    Tracking: Able to track stimulus in all quadrants w/o difficulty                                Range of Motion:    AROM: Generally decreased, functional  PROM: Within functional limits                      Strength:    Strength: Generally decreased, functional                Coordination:  Coordination: Within functional limits  Fine Motor Skills-Upper: Left Intact; Right Intact    Gross Motor Skills-Upper: Left Intact; Right Intact    Tone & Sensation:    Tone: Normal  Sensation: Intact Balance:  Sitting: Intact; Without support  Standing: Impaired; With support  Standing - Static: Constant support;Good  Standing - Dynamic : Good    Functional Mobility and Transfers for ADLs:  Bed Mobility:  Rolling: Contact guard assistance  Supine to Sit: Contact guard assistance  Scooting: Contact guard assistance    Transfers:  Sit to Stand: Contact guard assistance  Stand to Sit: Contact guard assistance  Bed to Chair: Contact guard assistance  Toilet Transfer : Contact guard assistance    ADL Assessment:  Feeding: Independent    Oral Facial Hygiene/Grooming: Stand-by assistance    Bathing: Contact guard assistance    Upper Body Dressing: Supervision    Lower Body Dressing: Contact guard assistance    Toileting: Contact guard assistance                ADL Intervention and task modifications:       Pt educated on role of OT and required verbal cues for safety and proper hand placement during ADLs/functional transfers. Cognitive Retraining  Safety/Judgement: Awareness of environment      Functional Measure:  Barthel Index:    Bathin  Bladder: 10  Bowels: 10  Groomin  Dressing: 10  Feeding: 10  Mobility: 10  Stairs: 5  Toilet Use: 5  Transfer (Bed to Chair and Back): 10  Total: 75       Barthel and G-code impairment scale:  Percentage of impairment CH  0% CI  1-19% CJ  20-39% CK  40-59% CL  60-79% CM  80-99% CN  100%   Barthel Score 0-100 100 99-80 79-60 59-40 20-39 1-19   0   Barthel Score 0-20 20 17-19 13-16 9-12 5-8 1-4 0      The Barthel ADL Index: Guidelines  1. The index should be used as a record of what a patient does, not as a record of what a patient could do. 2. The main aim is to establish degree of independence from any help, physical or verbal, however minor and for whatever reason. 3. The need for supervision renders the patient not independent. 4. A patient's performance should be established using the best available evidence.  Asking the patient, friends/relatives and nurses are the usual sources, but direct observation and common sense are also important. However direct testing is not needed. 5. Usually the patient's performance over the preceding 24-48 hours is important, but occasionally longer periods will be relevant. 6. Middle categories imply that the patient supplies over 50 per cent of the effort. 7. Use of aids to be independent is allowed. Ki Sullivan., Barthel, D.W. (2427). Functional evaluation: the Barthel Index. [address] [facility] (14)2. Billy Wilson lesli LEODAN Castillo, Mely Workman, Ashvin Ramirez., [place], [address] [facility] (1999). Measuring the change indisability after inpatient rehabilitation; comparison of the responsiveness of the Barthel Index and Functional [place] Measure. Journal of Neurology, Neurosurgery, and Psychiatry, 66(4), 297-338. SERGE Orellana, ALEA Avendano, & Tuan Lora M.A. (2004.) Assessment of post-stroke quality of life in cost-effectiveness studies: The usefulness of the Barthel Index and the EuroQoL-5D. Quality of Life Research, 13, 793-83         G codes: In compliance with CMSs Claims Based Outcome Reporting, the following G-code set was chosen for this patient based on their primary functional limitation being treated: The outcome measure chosen to determine the severity of the functional limitation was the barthel index with a score of 75/100 which was correlated with the impairment scale. ?  Self Care:     - CURRENT STATUS: CJ - 20%-39% impaired, limited or restricted    - GOAL STATUS: CI - 1%-19% impaired, limited or restricted    - D/C STATUS:  ---------------To be determined---------------     Occupational Therapy Evaluation Charge Determination   History Examination Decision-Making   LOW Complexity : Brief history review  LOW Complexity : 1-3 performance deficits relating to physical, cognitive , or psychosocial skils that result in activity limitations and / or participation restrictions  LOW Complexity : No comorbidities that affect functional and no verbal or physical assistance needed to complete eval tasks       Based on the above components, the patient evaluation is determined to be of the following complexity level: LOW   Pain:                    Activity Tolerance:   Orthostatic from supine to sit transfer but pt asymptomatic   Please refer to the flowsheet for vital signs taken during this treatment. After treatment:   [x] Patient left in no apparent distress sitting up in chair  [] Patient left in no apparent distress in bed  [x] Call bell left within reach  [x] Nursing notified  [] Caregiver present  [] Bed alarm activated    COMMUNICATION/EDUCATION:   The patients plan of care was discussed with: Physical Therapist and Registered Nurse. [x] Home safety education was provided and the patient/caregiver indicated understanding. [x] Patient/family have participated as able in goal setting and plan of care. [x] Patient/family agree to work toward stated goals and plan of care. [] Patient understands intent and goals of therapy, but is neutral about his/her participation. [] Patient is unable to participate in goal setting and plan of care. This patients plan of care is appropriate for delegation to [facility].     Thank you for this referral.  Eliceo Resendiz OT  Time Calculation: 23 mins

## 2018-07-22 NOTE — PROGRESS NOTES
Spiritual Care Assessment/Progress Note  [facility]      NAME: Raine May      MRN: [id number]  AGE: 80 y.o.  SEX: male  Taoism Affiliation: Lutheran   Language: English     7/21/2018     Total Time (in minutes): 41     Spiritual Assessment begun in [facility] through conversation with:         [x]Patient        [] Family    [] Friend(s)        Reason for Consult: Request by patient     Spiritual beliefs: (Please include comment if needed)     [x] Identifies with a grady tradition:         [x] Supported by a grady community:            [] Claims no spiritual orientation:           [] Seeking spiritual identity:                [] Adheres to an individual form of spirituality:           [] Not able to assess:                           Identified resources for coping:      [x] Prayer                               [] Music                  [] Guided Imagery     [] Family/friends                 [] Pet visits     [] Devotional reading                         [] Unknown     [] Other:                                               Interventions offered during this visit: (See comments for more details)    Patient Interventions: Affirmation of grady, Prayer (actual), Affirmation of emotions/emotional suffering, Initial/Spiritual assessment, patient floor, Normalization of emotional/spiritual concerns, Catharsis/review of pertinent events in supportive environment, Iconic (affirming the presence of God/Higher Power), Coping skills reviewed/reinforced           Plan of Care:     [x] Support spiritual and/or cultural needs    [] Support AMD and/or advance care planning process      [] Support grieving process   [] Coordinate Rites and/or Rituals    [x] Coordination with community clergy   [] No spiritual needs identified at this time   [] Detailed Plan of Care below (See Comments)  [] Make referral to Music Therapy  [] Make referral to Pet Therapy     [] Make referral to Addiction services  [] Make referral to [facility]  [] Make referral to Spiritual Care Partner  [] No future visits requested        [] Follow up visits as needed   Responded to page from pt's RN indicating that pt had asked for . Found pt sitting up in chair awake and alert although appearing tired. Pt shared that he had requested  because he was dealing with was not feeling well physically and in addition had received a call about his son that had distressed him. Provided active and empathic listening drawing from pt's strong rgady to provide words of reassurance.  familiar to this pt from previous visit. Pt a longtime original member at [employer] and had been assured by the [facility] earlier in the day that [employer] would be informed about pt's hospitalization. Pt expressed appreciation for ministry of presence and requested prayer which was provided. Accompanied pt until pt went back into bed. Encouraged pt to attempt to relax and rest as he waited for hospitalist to assess and possibly administer medication to alleviate pt's concerns. Stepped away to attend to incoming page. Pt would like follow up visit. Will plan to inform Ben Miles as well and follow up as able/needed. TIANNA Laws. Kyler Kaiser

## 2018-07-22 NOTE — PROGRESS NOTES
Hospitalist Progress Note NAME: Carmela Goodman :  1926 MRN:  [id number] Assessment / Plan: 
Likely convulsive syncope due to orthostatic hypotension with dehydration/poor po intake in setting of metastatic pancreatic ca:  +dysarthria and slurred speech after episode of starring for few minutes - CT head with no acute findings. Nonspecific white matter changes most common seen with chronic small vessel ischemic and/or senescent change. Intracranial atherosclerosis. - MRI brain no evidence of acute process. MRA brain with no flow limiting stenosis. - carotid dopplers with bilateral <50% stenosis of the internal carotid arteries - echo  with EF 55-60%. There were no regional wall motion abnormalities. - EEG completed, results not finalized; appreciate neurology assistance Nausea with vomiting, persistent SBO: repeated emesis overnight 
- CT A/P with persistent moderately severe mechanical small bowel obstruction, possibly related to adhesions. Stable colostomy hernia in the left anterior pelvic wall. Bilateral inguinal hernias, with the rectosigmoid stump incidentally coursing into the left inguinal hernia. Stable bilateral lung masses. Stable pancreatic mass. Other stable incidental changes, including a sclerotic lesion of the vertebral body of L2. 
- changed to clear liquids 
- general surgery consulted, discussed with Dr. Gayle Schmidt 
- will start IV fluids due to repeated emesis - I called and updated family Mild KHUSHBU (resolved) and hyponatremia: due to dehydration and HCTZ; Cr stable but not quite at baseline 
- stopped HCTZ, will not restart Metastatic pancreatic cancer with mets to lungs and peritoneum: 
- appreciate oncology assistance 
- has received palliative chemo x1. Do not feel he is good candidate for further therapy given multiple admissions and apparent declining health.  
- palliative care consulted.   Discussed with granddaughter today and she and Pt's son are considering transition to hospice but want to discuss with Dr. Leobardo Washington again. Orthostatic hypotension in setting of benign HTN: improving blood pressure 
- stopped norvasc, labetalol, HCTZ with orthostasis 
- con't prn nitropaste Hyperlipidemia:  D/c statin due to very advanced age and poor life expectancy GERD (gastroesophageal reflux disease): con't PPI Severe malnutrition: has lost ~15 lbs (7.5%) in the past 3 months due to pancreatic cancer and incarcerated left inguinal hernia with SBO 
- nutrition consulted 
- supplements added Hypophosphatemia, resolved Hypokalemia, resolved 
   
Code Status: DNR Surrogate Decision Maker: son  
DVT Prophylaxis: Lovenox Subjective: Chief Complaint / Reason for Physician Visit \"I threw up last night\". Discussed with RN events overnight. Review of Systems: 
Symptom Y/N Comments  Symptom Y/N Comments Fever/Chills n   Chest Pain n   
Poor Appetite y   Edema n   
Cough n   Abdominal Pain n   
Sputum n   Joint Pain SOB/BUI n   Pruritis/Rash Nausea/vomit    Tolerating PT/OT Diarrhea    Tolerating Diet Constipation    Other Could NOT obtain due to:   
 
Objective: VITALS:  
Last 24hrs VS reviewed since prior progress note. Most recent are: 
Patient Vitals for the past 24 hrs: 
 Temp Pulse Resp BP SpO2  
07/22/18 0742 97.6 °F (36.4 °C) 95 18 140/74 97 %  
07/22/18 0324 97.9 °F (36.6 °C) 93 18 132/72 95 %  
07/21/18 2314 97.5 °F (36.4 °C) (!) 101 18 128/69 97 %  
07/21/18 2005 97.6 °F (36.4 °C) 83 18 137/82 97 %  
07/21/18 1600 98.4 °F (36.9 °C) 78 18 139/70 97 %  
07/21/18 1210 98.1 °F (36.7 °C) 82 18 116/70 98 % Intake/Output Summary (Last 24 hours) at 07/22/18 1123 Last data filed at 07/22/18 0000 Gross per 24 hour Intake                0 ml Output             1000 ml Net            -1000 ml PHYSICAL EXAM: 
General: WD, WN. Alert, cooperative, no acute distress   
EENT:  EOMI. Anicteric sclerae. MMM Resp:  CTA bilaterally, no wheezing or rales. No accessory muscle use CV:  Regular  rhythm,  No edema GI:  Soft, moderately distended, Non tender.  Diminished bowel sounds, +LLQ ostomy with brown liquid stool Neurologic:  Alert and oriented X 3, normal speech, Psych:   Limited insight. Not anxious nor agitated Skin:  Pale, No rashes. No jaundice Reviewed most current lab test results and cultures  YES Reviewed most current radiology test results   YES Review and summation of old records today    NO Reviewed patient's current orders and MAR    YES 
PMH/SH reviewed - no change compared to H&P 
________________________________________________________________________ Care Plan discussed with: 
  Comments Patient x Family  x Granddaughter over phone; left message for son RN x Care Manager Consultant  x General surgery Multidiciplinary team rounds were held today with , nursing, pharmacist and clinical coordinator. Patient's plan of care was discussed; medications were reviewed and discharge planning was addressed. ________________________________________________________________________ Total NON critical care TIME:  40 Minutes Total CRITICAL CARE TIME Spent:   Minutes non procedure based Comments >50% of visit spent in counseling and coordination of care x   
________________________________________________________________________ Jennifer Soni MD  
 
Procedures: see electronic medical records for all procedures/Xrays and details which were not copied into this note but were reviewed prior to creation of Plan. LABS: 
I reviewed today's most current labs and imaging studies. Pertinent labs include: 
Recent Labs  
   07/22/18 
 0413  07/21/18 
 0400  07/20/18 
 0414 WBC  14.5*  6.8  8.8 HGB  12.3  10.5*  10.5* HCT  35.9*  31.5*  30.1* PLT  422*  350  338 Recent Labs  
   07/22/18 
 0413  07/21/18 
 0400  07/20/18 
 0414  07/19/18 
 2224 NA  138  138  133*  131*  
K  3.6  3.7  3.2*  3.2*  
CL  103  106  99  97 CO2  23  25  25  24 GLU  167*  139*  129*  145* BUN  26*  17  18  21* CREA  1.12  1.12  1.12  1.23  
CA  9.0  8.3*  8.4*  8.4* MG   --   1.6   --    --   
PHOS   --   2.5*   --    --   
ALB   --    --   2.7*  2.9* TBILI   --    --   1.2*  1.1*  
SGOT   --    --   10*  13* ALT   --    --   15  17 Signed: Lucas Ruelas MD

## 2018-07-22 NOTE — PROGRESS NOTES
Bedside and Verbal shift change report given to April (oncoming nurse) by Roselia Hickey RN (offgoing nurse). Report included the following information SBAR, Kardex, Intake/Output and MAR.     Zone Phone:   [phone]        Significant changes during shift:  Vomitting x1, prn order for Zofran           Patient Information     Daisy Loyd  80 y.o.  7/19/2018  8:55 PM by Gui Navarrete MD. Daisy Loyd was admitted from Home     Problem List          Patient Active Problem List     Diagnosis Date Noted    Dysarthria 07/20/2018    Hyperlipidemia 07/20/2018    GERD (gastroesophageal reflux disease) 07/20/2018    KHUSHBU (acute kidney injury) ([address] 07/20/2018    Hyponatremia 07/20/2018    Convulsive syncope 07/20/2018    Altered mental status, unspecified 07/20/2018    Bilateral carotid artery stenosis 07/20/2018    Cerebral microvascular disease 07/20/2018    SBO (small bowel obstruction) ([address] 07/06/2018    Metastasis from pancreatic cancer ([address] 07/06/2018    HTN (hypertension) 05/03/2018    Primary pancreatic cancer with metastasis to other site ([address] 05/03/2018    ECG abnormality 04/30/2018    Pancreatic mass 04/28/2018    Hernia, inguinal, recurrent, with obstruction 04/28/2018           Past Medical History:   Diagnosis Date    Arthritis      Colon cancer ([address]      ECG abnormality 4/30/2018    GERD (gastroesophageal reflux disease)      High cholesterol      Hypertension      Lung cancer ([address]      Other ill-defined conditions(799.89) ~2000     arm tingling to ER, per pt states was not a stroke, but has been on plavix and aggrenox since.   currently on plavix    Pancreatic cancer ([address]      Sleep apnea with use of continuous positive airway pressure (CPAP)              Core Measures:     CVA: Yes Yes     Activity Status:     OOB to Chair No  Ambulated this shift Yes   Bed Rest No        LINES AND DRAINS:     Ostomy     DVT prophylaxis:     DVT prophylaxis Med- Yes  DVT prophylaxis SCD or BUFFY- No      Patient Safety:     Falls Score Total Score: 2  Safety Level_______  Bed Alarm On? No  Sitter?  No     Plan for upcoming shift: XR tomorrow morning           Discharge Plan: No      Active Consults:  IP CONSULT TO NEUROLOGY  IP CONSULT TO PALLIATIVE CARE - PROVIDER  IP CONSULT TO ONCOLOGY

## 2018-07-22 NOTE — CONSULTS
Surgery      Patient known from surgery on 4/29 and admission earlier this month. Admitted with what was thought to have been a CVA, but this seems to have been ruled out. Had multiple episodes of vomiting last night. CT scan showed:'1. Persistent moderately severe mechanical small bowel obstruction, possibly  related to adhesions. 2. Stable colostomy hernia in the left anterior pelvic wall. 3. Bilateral inguinal hernias, left greater than right, with the rectosigmoid  stump incidentally coursing into the left inguinal hernia. 4. Stable bilateral lung masses. 5. Stable cholelithiasis. 6. Stable pancreatic mass. 7. Stable prostatic enlargement and heterogeneous enhancement. 8. Other stable incidental changes, including a sclerotic lesion of the  vertebral body of L2. .\"    Pt reports feels better today, some ostomy function, eating a clear liquid diet as I speak with him. I think further definition of his small bowel will be beneficial so will order a SBFT for the AM.    Dr Hansa Rodriguez will assume surgical evaluation of patient in the AM.      Serafin Ryan.  Elyssa Stern MD, [facility]

## 2018-07-22 NOTE — ROUTINE PROCESS
Bedside shift change report given to Ness Anand RN (oncoming nurse) by Rowena Pena (offgoing nurse). Report included the following information SBAR, Kardex, Intake/Output, MAR, Accordion and Recent Results.

## 2018-07-22 NOTE — PROGRESS NOTES
2043 Pt c/o indigestion with no relief post Tums. MD Shirley Sparks notified and one time dose GI cocktail ordered. 2300 Pt having n/v with 500ml emesis. Pt states he \"feels better\". Afebrile in NAD, will page MD.    Martinez Quezadads in room with pt per pt request.    2348 MD paged again. 2353 SBAR given to MD. PRN promethazine ordered. 0020 Pt in bed with 500mL emesis, compazine given. Pt also stating his Arana Balloon are all worked up\" and requested for something to help him \"calm down and sleep\". Miles.John DAN paged. 0132 No return call from hospitalist, pt still requesting something for anxiety. MD paged again. 0148 Pt called c/o anxiety and SOB, O2 on room air 97%, 2L O2 nc given for comfort. Hospitalist service paged again. 0205 1mg Ativan IV ONCE ordered per MD Merchant.

## 2018-07-23 NOTE — PROGRESS NOTES
PT note:     Chart reviewed and spoke with nursing. Patient currently off the floor for testing. Will follow up for PT treatment session.      [place] Dylan, PT, DPT

## 2018-07-23 NOTE — PROGRESS NOTES
Hospitalist Progress Note NAME: Rc Ibarra :  1926 MRN:  [id number] Assessment / Plan: 
Nausea with vomiting, persistent SBO: repeated emesis the last 2 days - CT A/P with persistent moderately severe mechanical small bowel obstruction, possibly related to adhesions. Stable colostomy hernia in the left anterior pelvic wall. Bilateral inguinal hernias, with the rectosigmoid stump incidentally coursing into the left inguinal hernia. Stable bilateral lung masses. Stable pancreatic mass. Other stable incidental changes, including a sclerotic lesion of the vertebral body of L2. 
- small bowel follow through today, note that Pt vomited during test as well. Results ending. 
- appreciate surgical consultation - IV fluids due to repeated emesis 
- con't reglan, compazine and zofran for nausea. Pt did not like NG tube last admission, so trying to avoid. - I called and updated family today. Son says that they understand he is no longer candidate for chemotherapy. Would like to relieve bowel obstruction if possible as he is feeling so poorly with nausea and cannot eat, then transition home with hospice - they have friend Rowena noel with [facility] and that would be their first choice. Discussed with CM today. Granddaughter who is very helpful nurse at [employer] is off work tomorrow and will bring son up to help discuss plan of care (son does not drive). Convulsive syncope due to orthostatic hypotension with dehydration/poor po intake in setting of metastatic pancreatic ca:  +dysarthria and slurred speech after episode of starring for few minutes PTA 
- CT head with no acute findings. Nonspecific white matter changes most common seen with chronic small vessel ischemic change. Intracranial atherosclerosis. - MRI brain no evidence of acute process.  MRA brain with no flow limiting stenosis. - carotid dopplers with bilateral <50% stenosis of the internal carotid arteries - echo  with EF 55-60%. There were no regional wall motion abnormalities. - EEG normal electroencephalogram for the age of 80 years 
- con't to treat medical issues as able Mild KHUSHBU (resolved) and hyponatremia: due to dehydration and HCTZ; Cr stable but not quite at baseline 
- stopped HCTZ, will not restart Metastatic pancreatic cancer with mets to lungs and peritoneum: 
- appreciate oncology assistance 
- has received palliative chemo x1. Per oncology notes, no longer candidate for further chemotherapy 
- hospice consulted as above Orthostatic hypotension in setting of benign HTN: improving blood pressure 
- stopped norvasc, labetalol, HCTZ with orthostasis 
- con't prn nitropaste Hyperlipidemia:  D/c statin due to very advanced age and poor life expectancy GERD (gastroesophageal reflux disease): con't PPI Severe malnutrition: has lost ~15 lbs (7.5%) in the past 3 months due to pancreatic cancer and incarcerated left inguinal hernia with SBO 
- nutrition consulted 
- supplements added Hypophosphatemia, resolved Hypokalemia, resolved 
   
Code Status: DNR Surrogate Decision Maker: son Rafael [phone], granddaughter Yaneth Linn [phone] DVT Prophylaxis: Lovenox Subjective: Chief Complaint / Reason for Physician Visit \"I feel really bad\". Nauseated, continues to vomit. Discussed with RN events overnight. Review of Systems: 
Symptom Y/N Comments  Symptom Y/N Comments Fever/Chills n   Chest Pain n   
Poor Appetite y   Edema n   
Cough n   Abdominal Pain n   
Sputum n   Joint Pain SOB/BUI n   Pruritis/Rash Nausea/vomit    Tolerating PT/OT Diarrhea    Tolerating Diet Constipation    Other Could NOT obtain due to:   
 
Objective: VITALS:  
Last 24hrs VS reviewed since prior progress note.  Most recent are: 
Patient Vitals for the past 24 hrs: 
 Temp Pulse Resp BP SpO2  
07/23/18 0503 97.4 °F (36.3 °C) 96 18 158/89 95 %  
07/23/18 0131 97.3 °F (36.3 °C) 99 18 133/83 96 %  
07/22/18 2116 97.8 °F (36.6 °C) 92 20 137/83 93 % 07/22/18 2033 97.7 °F (36.5 °C) (!) 101 30 139/77 92 %  
07/22/18 1515 97.7 °F (36.5 °C) 98 22 144/88 95 % Intake/Output Summary (Last 24 hours) at 07/23/18 1206 Last data filed at 07/22/18 6739 Gross per 24 hour Intake                0 ml Output              500 ml Net             -500 ml PHYSICAL EXAM: 
General: WD, WN. Alert, cooperative, no acute distress   
EENT:  EOMI. Anicteric sclerae. MMM Resp:  CTA bilaterally, no wheezing or rales. No accessory muscle use CV:  Regular rhythm,  No edema GI:  Soft, Non distended, Non tender. Diminished bowel sounds. LLQ ostomy with brown liquid stool. Neurologic:  Alert and oriented X 3, normal speech, Psych:   Some insight. mildly anxious, not agitated Skin:  Pale, No rashes. No jaundice Reviewed most current lab test results and cultures  YES Reviewed most current radiology test results   YES Review and summation of old records today    NO Reviewed patient's current orders and MAR    YES 
PMH/SH reviewed - no change compared to H&P 
________________________________________________________________________ Care Plan discussed with: 
  Comments Patient x Family  x Son over phone; left message for granddaughter RN x Care Manager x Consultant  x surgery Multidiciplinary team rounds were held today with , nursing, pharmacist and clinical coordinator. Patient's plan of care was discussed; medications were reviewed and discharge planning was addressed. ________________________________________________________________________ Total NON critical care TIME:  35 Minutes Total CRITICAL CARE TIME Spent:   Minutes non procedure based Comments >50% of visit spent in counseling and coordination of care x   
________________________________________________________________________ Chuy Crook MD  
 
Procedures: see electronic medical records for all procedures/Xrays and details which were not copied into this note but were reviewed prior to creation of Plan. LABS: 
I reviewed today's most current labs and imaging studies. Pertinent labs include: 
Recent Labs  
   07/23/18 0515 07/22/18 0413 07/21/18 
 0400 WBC  13.9*  14.5*  6.8 HGB  12.7  12.3  10.5* HCT  37.1  35.9*  31.5* PLT  468*  422*  350 Recent Labs  
   07/23/18 0515 07/22/18 0413 07/21/18 
 0400 NA  137  138  138  
K  3.9  3.6  3.7 CL  105  103  106 CO2  23  23  25 GLU  167*  167*  139* BUN  32*  26*  17  
CREA  1.12  1.12  1.12  
CA  9.4  9.0  8.3*  
MG  1.8   --   1.6 PHOS  2.8   --   2.5* Signed: Jil Glasgow MD

## 2018-07-23 NOTE — PROCEDURES
[address]ados  MR#: [id number]  : 1926  ACCOUNT #: [de-identified]   DATE OF SERVICE: 2018    DESCRIPTION OF PROCEDURE:  The electrodes were applied in accordance with the International 10-20 system of electrode placement. The EEG was reviewed in both bipolar and referential montages. This is an awake EEG. FINDINGS:  The background is a symmetric mixed 7-8 Hz theta alpha rhythm, best seen in the posterior leads. Hyperventilation and photic stimulation are not performed. IMPRESSION:  This is a normal electroencephalogram for the age of 80 years. The absence of seizure activity on an electroencephalogram does not eliminate a diagnosis of epilepsy; clinical correlation is advised.       MD Pankaj Walls / Ford.Toya  D: 2018 16:35     T: 2018 17:08  JOB #: [id number]

## 2018-07-23 NOTE — PROGRESS NOTES
Occupational Therapy    Completed chart review and discussed patient with nursing. Patient off the floor for testing. Will continue to follow for OT services.        Thank you,    Benoit [facility]  OTR/L

## 2018-07-23 NOTE — CONSULTS
Palliative Medicine Consult  Gustavo: [phone])    Patient Name: Stevan Avery  YOB: 1926    Date of Initial Consult: 7/23/18  Reason for Consult: Assist with plan of care, education.  Family considering transition to hospice  Requesting Provider: Kassandra Orantes MD  Primary Care Physician: Talib Forrester MD     SUMMARY:   Stevan Avery is a 80 y.o. with a past history of  HTN, HCL, GERD, TIA, pancreatic CA w/ metastatic disease, who was admitted on 7/19/2018 from home with a diagnosis of TIA. Current medical issues leading to Palliative Medicine involvement include: metastatic pancreatic cancer, SBO last week that resolved with medical management, now with AMS. PALLIATIVE DIAGNOSES:   1. PLAN:   1. No family at bedside. 2. Per 's note, referring pt to hospice, therefore, will cancel Palliative consult. 3. Initial consult note routed to primary continuity provider  4.  Communicated plan of care with: Palliative IDT       GOALS OF CARE / TREATMENT PREFERENCES:     GOALS OF CARE:         TREATMENT PREFERENCES:   Code Status: DNR    Advance Care Planning:  Advance Care Planning 7/20/2018   Patient's Healthcare Decision Maker is: Legal Next of Kin   Primary Decision Maker Name Rossy Salinas   Primary Decision Maker Phone Number [phone]   Primary Decision Maker Relationship to Patient Adult child   Secondary Decision Maker Name -   Secondary Decision Maker Phone Number -   Secondary Decision Maker Relationship to Patient -   Confirm Advance Directive Yes, on file   Patient Would Like to Complete Advance Directive -   Does the patient have other document types -

## 2018-07-23 NOTE — ROUTINE PROCESS
Bedside shift change report given to Aurora Cantu RN (oncoming nurse) by Rowena Pena (offgoing nurse). Report included the following information SBAR, Kardex, Intake/Output, MAR, Accordion and Recent Results.

## 2018-07-23 NOTE — PROGRESS NOTES
[address] [facility] at [facility] [address] [facility], [facility], [address] [place], [address] [facility] 
[phone] Reason for consultation:  
Evita Oseguera is a 80 y.o. male with metastatic pancreatic cancer who is seen in consultation at the request of Dr. Hartman Killer Treatment History: 1. 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  
 one dose of gem/abraxane 6/27/18 History of Present Illness:  
Schuyler Bence is a 79 yo male with a recent diagnosis of metastatic pancreatic cancer. He received one cycle of chemotherapy with gemcitabine and abraxane. Chemotherapy was held on 7/5/18 due to vomiting and pt not feeling well. He later went to the ED and was found to have a SBO and hospitalized. Obstruction resolved with non-surgical management. He was scheduled to resume chemotherapy next week. He presented to the ED yesterday with complaints of AMS and dysarthria. He was admitted for further evaluation. Neurology has seen the patient and believes that he most likely had a syncopal episode related to hypotension. Mr. Beatrice Blanca was back to baseline last Friday, however over the weekend he declined and is now unable to be aroused. His son has called our office and feels the family is ready to be transitioned to hospice. Past Medical History:  
Diagnosis Date  Arthritis  Colon cancer ([address]  ECG abnormality 4/30/2018  GERD (gastroesophageal reflux disease)  High cholesterol  Hypertension  Lung cancer ([address]  Other ill-defined conditions(799.89) ~2000  
 arm tingling to ER, per pt states was not a stroke, but has been on plavix and aggrenox since. currently on plavix  Pancreatic cancer ([address]  Sleep apnea with use of continuous positive airway pressure (CPAP) Past Surgical History:  
Procedure Laterality Date  HX CATARACT REMOVAL Bilateral 2008 and 2012  HX COLECTOMY  HX HERNIA REPAIR Left 04/28/2018 Reduced & repaired [facility] w/sigmoid resection & end colostomy  HX ORTHOPAEDIC Right   
 elbow [place] ARTHROPLASTY Left 1999  TOTAL KNEE ARTHROPLASTY Right 2011 Social History Substance Use Topics  Smoking status: Never Smoker  Smokeless tobacco: Never Used  Alcohol use No  
  
Family History Problem Relation Age of Onset  Stroke Mother  Other Father Brain tumor Current Facility-Administered Medications Medication Dose Route Frequency  [START ON 7/24/2018] famotidine (PF) (PEPCID) injection 20 mg  20 mg IntraVENous DAILY  prochlorperazine (COMPAZINE) with saline injection 10 mg  10 mg IntraVENous Q6H PRN  
 ondansetron (ZOFRAN) injection 4 mg  4 mg IntraVENous Q6H PRN  
 metoclopramide HCl (REGLAN) injection 5 mg  5 mg IntraVENous AC&HS  
 lactated Ringers infusion  50 mL/hr IntraVENous CONTINUOUS  
 nitroglycerin (NITROBID) 2 % ointment 1 Inch  1 Inch Topical Q6H PRN  
 sodium chloride (NS) flush 5-10 mL  5-10 mL IntraVENous Q8H  
 sodium chloride (NS) flush 5-10 mL  5-10 mL IntraVENous PRN  
 acetaminophen (TYLENOL) tablet 650 mg  650 mg Oral Q4H PRN Or  
 acetaminophen (TYLENOL) solution 650 mg  650 mg Per NG tube Q4H PRN Or  
 acetaminophen (TYLENOL) suppository 650 mg  650 mg Rectal Q4H PRN  
 enoxaparin (LOVENOX) injection 40 mg  40 mg SubCUTAneous DAILY  calcium carbonate (TUMS) chewable tablet 200 mg [elemental]  200 mg Oral QID PRN Allergies Allergen Reactions  Aleve [Naproxen Sodium] Swelling Throat swelling Review of Systems: A complete review of systems was obtained, negative except as described above. Physical Exam:  
 
Visit Vitals  BP (!) 171/92 (BP 1 Location: Right arm, BP Patient Position: Head of bed elevated (Comment degrees)) Comment (BP Patient Position): HOB 30 degrees  Pulse 92  Temp 97.4 °F (36.3 °C)  Resp 26  
 Ht 6' (1.829 m)  Wt 180 lb (81.6 kg)  
 SpO2 97%  BMI 24.41 kg/m2 ECOG PS: 0 General: No distress, lethargic Eyes: PERRLA, anicteric sclerae HENT: Atraumatic with normal appearance of ears and nose; OP clear; NGT in place Neck: Supple; no thyromegaly Respiratory: CTAB, normal respiratory effort CV: Normal rate, regular rhythm, no murmurs, no peripheral edema GI: Soft, nontender, nondistended, no masses, no hepatomegaly, no splenomegaly; decreased BS 
MS: Normal gait and station. Digits without clubbing or cyanosis. Skin: No rashes, ecchymoses, or petechiae. Normal temperature, turgor, and texture. Neuro/Psych: lethargic Results:  
 
Lab Results Component Value Date/Time WBC 13.9 (H) 07/23/2018 05:15 AM  
 HGB 12.7 07/23/2018 05:15 AM  
 HCT 37.1 07/23/2018 05:15 AM  
 PLATELET 431 (H) 99/68/1742 05:15 AM  
 .1 (H) 07/23/2018 05:15 AM  
 ABS. NEUTROPHILS 6.4 07/20/2018 04:14 AM  
 
Lab Results Component Value Date/Time Sodium 137 07/23/2018 05:15 AM  
 Potassium 3.9 07/23/2018 05:15 AM  
 Chloride 105 07/23/2018 05:15 AM  
 CO2 23 07/23/2018 05:15 AM  
 Glucose 167 (H) 07/23/2018 05:15 AM  
 BUN 32 (H) 07/23/2018 05:15 AM  
 Creatinine 1.12 07/23/2018 05:15 AM  
 GFR est AA >60 07/23/2018 05:15 AM  
 GFR est non-AA >60 07/23/2018 05:15 AM  
 Calcium 9.4 07/23/2018 05:15 AM  
 Glucose (POC) 117 (H) 11/30/2009 04:31 PM  
 
Lab Results Component Value Date/Time Bilirubin, total 1.2 (H) 07/20/2018 04:14 AM  
 ALT (SGPT) 15 07/20/2018 04:14 AM  
 AST (SGOT) 10 (L) 07/20/2018 04:14 AM  
 Alk. phosphatase 83 07/20/2018 04:14 AM  
 Protein, total 5.7 (L) 07/20/2018 04:14 AM  
 Albumin 2.7 (L) 07/20/2018 04:14 AM  
 Globulin 3.0 07/20/2018 04:14 AM  
 
7/7/18 CT a/p FINDINGS:  
LUNG BASES: Numerous nodules and masses. Minimal bilateral effusions. 
  
INCIDENTALLY IMAGED HEART AND MEDIASTINUM: Trace pericardial fluid. Esophageal 
wall thickening and hyperemia. LIVER: No mass or biliary dilatation.  
GALLBLADDER: Cholelithiasis without inflammatory change. SPLEEN: No mass. PANCREAS: Heterogeneously cystic and solid lesion in the tail and body, 
measuring 6.7 x 4.8 x 4.6 cm, containing calcification without obvious 
associated ductal dilatation. Pancreatic tail atrophy. 
  
ADRENALS: Unremarkable. KIDNEYS: No mass, calculus, or hydronephrosis. STOMACH: Distended with fluid. SMALL BOWEL: Increased distention of small bowel loops with decompressed distal 
small bowel loop. Decompressed colon as well. Transition point related to a 
spigelian hernia on the left. COLON: There is a left inguinal hernia as well this is not the current source of 
obstruction. Right inguinal hernia contains fluid. . Numerous diverticula in the 
sigmoid colon. APPENDIX: Not well seen. PERITONEUM: No pneumoperitoneum. Ascites. RETROPERITONEUM: No lymphadenopathy or aortic aneurysm. Atherosclerotic change. REPRODUCTIVE ORGANS: Enlarged prostate. URINARY BLADDER: No mass or calculus. BONES: No destructive bone lesion. ADDITIONAL COMMENTS:  
IMPRESSION IMPRESSION: 
  
Small bowel obstruction related to spigelian hernia on the left. Left inguinal 
hernia contains large bowel and does not cause obstruction. Smaller right 
inguinal hernia containing fluid. Cystic and solid mass of the pancreas not significantly changed. Multiple pulmonary nodules and small pleural effusions. Assessment and Recommendations: 1. Altered Mental status - ? Convulsive syncope Had resolved on Friday, but worsened over the weekend. Today we were not able to arouse the patient. His eyes were closed and he did not open them to verbal stimulation or mild touch. Currently, EEG is in progress. Followed by neurology. 2. Metastatic pancreatic cancer, stage IV, to lungs and peritoneum: S/p one dose of chemotherapy with Gemcitabine and Abraxane. Goal of care palliative. Being followed by  
 
Plan: At this time the patient is not a candidate for any further treatments and the recommendation is to transition to hospice. Dr. Amena Branch will discuss with the son via phone.   
 
 
Signed By: Ahsan Brown NP

## 2018-07-23 NOTE — PROGRESS NOTES
Surgery      Pt off floor all morning for SBFT. Will follow up on results tomorrow. Rylie Solano.  Shadia Pulliam MD, [facility]

## 2018-07-23 NOTE — PROGRESS NOTES
Visit Vitals    /77    Pulse (!) 101    Temp 97.7 °F (36.5 °C)    Resp 30    Ht 6' (1.829 m)    Wt 81.6 kg (180 lb)    SpO2 92%    BMI 24.41 kg/m2       2057 MEWS: 4 Pt resting in bed, in NAD. Hospitalist paged. 2058 MD Parisi notified, no new orders received. Will continue to monitor.

## 2018-07-24 NOTE — ROUTINE PROCESS
Bedside shift change report given to Kelly Partida RN (oncoming nurse) by Estrellita Ramos RN (offgoing nurse). Report included the following information SBAR, Kardex, Intake/Output, MAR, Accordion and Recent Results.

## 2018-07-24 NOTE — PROGRESS NOTES
Nutrition Assessment:    INTERVENTIONS/RECOMMENDATIONS:   Meals/Snacks: General/healthful diet: Clear liquids; advance diet as tolerated   Supplements: Commercial supplement: Switch ensure enlive to ensure clear while receiving a clear liquid diet    ASSESSMENT:   Chart reviewed; patient medically noted for nausea, vomiting, and persistent SBO. PMH for metastatic pancreatic adenocarcinoma, HTN, and GERD. Diet downgraded to clear liquids; previously receiving ensure enlive which is not appropriate on clear liquids. Switched ONS to ensure clear for the time being. Can change back to ensure enlive as diet advances if patient desires. Per notes, family would like to take patient home with hospice. No further treatment recommended for cancer. Diet Order: Clear liquids  % Eaten:  No data found. Pertinent Medications: [x] Reviewed []Other: Famotidine, Reglan  Pertinent Labs: [x]Reviewed  []Other:  [phone]  Food Allergies: [x]None []Other:     Last BM: 7/23   [x]Active     []Hyperactive  []Hypoactive       [] Absent  BS  Skin:    [x] Intact   [] Incision  [] Breakdown   []Edema   []Other:    Anthropometrics: Height: 6' (182.9 cm) Weight: 81.6 kg (180 lb)    IBW (%IBW):   ( ) UBW (%UBW):   (  %)    BMI: Body mass index is 24.41 kg/(m^2). This BMI is indicative of:  []Underweight   [x]Normal   []Overweight   [] Obesity   [] Extreme Obesity (BMI>40)  Last Weight Metrics:  Weight Loss Metrics 7/19/2018 7/14/2018 7/6/2018 7/5/2018 7/5/2018 6/27/2018 6/27/2018   Today's Wt 180 lb 183 lb 185 lb 6.5 oz 185 lb 7 oz 185 lb 7 oz 182 lb 182 lb 14.4 oz   BMI 24.41 kg/m2 24.82 kg/m2 25.15 kg/m2 25.15 kg/m2 25.15 kg/m2 24.68 kg/m2 24.81 kg/m2       Estimated Nutrition Needs (Based on): 1950 Kcals/day (BMR: 1500 x 1.3) , 98 g (1.2 g/kg bw) Protein  Carbohydrate:  At Least 130 g/day  Fluids: 1950 mL/day     Pt expected to meet estimated nutrient needs: []Yes [x]No    NUTRITION DIAGNOSES:   Problem:  Altered GI function Etiology: related to SBO     Signs/Symptoms: as evidenced by clear liquids, nasuea/vomiting      NUTRITION INTERVENTIONS:  Meals/Snacks: General/healthful diet   Supplements: Commercial supplement              GOAL:   PO intake >50% of meals/supplements next 5-7 days    NUTRITION MONITORING AND EVALUATION   Food/Nutrient Intake Outcomes:  Total energy intake  Physical Signs/Symptoms Outcomes: Weight/weight change, Electrolyte and renal profile, GI profile, Glucose profile    Previous Goal Met:   [] Met              [x] Progressing Towards Goal              [] Not Progressing Towards Goal   Previous Recommendations:   [x] Implemented          [] Not Implemented          [] Not Applicable    LEARNING NEEDS (Diet, Food/Nutrient-Drug Interaction):    [x] None Identified   [] Identified and Education Provided/Documented   [] Identified and Pt declined/was not appropriate     Cultural, Restorationist, OR Ethnic Dietary Needs:    [x] None Identified   [] Identified and Addressed     [x] Interdisciplinary Care Plan Reviewed/Documented    [x] Discharge Planning: Regular diet   [] Participated in Interdisciplinary Rounds    NUTRITION RISK:    [] High              [] Moderate           [x]  Low  []  Minimal/Uncompromised      [facility]  Pager [phone]               Weekend Pager [phone]

## 2018-07-24 NOTE — PROGRESS NOTES
CM met with pt's son and granddaughter and provided the 2nd  Medicare letter and they understood and signed it. Copy left with them. [facility] met with pt and family and they can admit pt today at home. CM set up medical transport with [facility] to transport pt home today. Pickup time is 4pm and this CM called [facility], the granddaughter and informed her of the pickup time. Care Management Interventions  PCP Verified by CM: Yes  Palliative Care Criteria Met (RRAT>21 & CHF Dx)?: No  Mode of Transport at Discharge: BLS (medical transport )  Hospital Transport Time of Discharge: 1600  Transition of Care Consult (CM Consult): Discharge Planning, [place]: No  Reason Outside [place]: Patient already serviced by other home care/hospice agency ([address])  [address] [place]: No  Discharge Durable Medical Equipment: No  Physical Therapy Consult: Yes  Occupational Therapy Consult: Yes  Speech Therapy Consult: Yes  Current Support Network:  Other (Pt's son lives with him.)  Confirm Follow Up Transport: Other (see comment) (medical transport )  Plan discussed with Pt/Family/Caregiver: Yes  Freedom of Choice Offered: Yes  [address] Provided?: No  Discharge Location  Discharge Placement: Home with [address], [address]

## 2018-07-24 NOTE — PROGRESS NOTES
Admit Date: 2018    POD * No surgery found *    Procedure:  * No surgery found *    Subjective:     Patient with ongoing nausea, tolerating clears however. Continues to have some ostomy output. Objective:     Blood pressure 152/78, pulse 96, temperature 97.3 °F (36.3 °C), resp. rate 23, height 6' (1.829 m), weight 180 lb (81.6 kg), SpO2 96 %. Temp (24hrs), Av.4 °F (36.3 °C), Min:97.2 °F (36.2 °C), Max:97.7 °F (36.5 °C)      Physical Exam:  GENERAL: alert, cooperative, no distress, appears stated age, LUNG: clear to auscultation bilaterally, HEART: regular rate and rhythm, ABDOMEN: soft, distended, non-tender.  Ostomy intact, EXTREMITIES:  extremities normal, atraumatic, no cyanosis or edema    Labs:   Recent Results (from the past 24 hour(s))   CBC W/O DIFF    Collection Time: 18  4:37 AM   Result Value Ref Range    WBC 10.9 4.1 - 11.1 K/uL    RBC 3.57 (L) 4.10 - 5.70 M/uL    HGB 12.5 12.1 - 17.0 g/dL    HCT 36.3 (L) 36.6 - 50.3 %    .7 (H) 80.0 - 99.0 FL    MCH 35.0 (H) 26.0 - 34.0 PG    MCHC 34.4 30.0 - 36.5 g/dL    RDW 14.6 (H) 11.5 - 14.5 %    PLATELET 823 809 - 356 K/uL    MPV 10.1 8.9 - 12.9 FL    NRBC 0.0 0  WBC    ABSOLUTE NRBC 0.00 0.00 - 6.65 K/uL   METABOLIC PANEL, BASIC    Collection Time: 18  4:37 AM   Result Value Ref Range    Sodium 136 136 - 145 mmol/L    Potassium 3.7 3.5 - 5.1 mmol/L    Chloride 102 97 - 108 mmol/L    CO2 25 21 - 32 mmol/L    Anion gap 9 5 - 15 mmol/L    Glucose 152 (H) 65 - 100 mg/dL    BUN 35 (H) 6 - 20 MG/DL    Creatinine 1.03 0.70 - 1.30 MG/DL    BUN/Creatinine ratio 34 (H) 12 - 20      GFR est AA >60 >60 ml/min/1.73m2    GFR est non-AA >60 >60 ml/min/1.73m2    Calcium 8.8 8.5 - 10.1 MG/DL       Data Review images and reports reviewed    Assessment:     Active Problems:    HTN (hypertension) (5/3/2018)      Metastasis from pancreatic cancer ([facility] (2018)      Dysarthria (2018)      Hyperlipidemia (2018)      GERD (gastroesophageal reflux disease) (7/20/2018)      KHUSHBU (acute kidney injury) ([facility] (7/20/2018)      Hyponatremia (7/20/2018)      Convulsive syncope (7/20/2018)      Altered mental status, unspecified (7/20/2018)      Bilateral carotid artery stenosis (7/20/2018)      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 Contrast not into colon at 6 1/2 hours. Portable KUB pending for this am.  Doesn't appear to be related to a parastomal hernia causing SBO. Suspect this is progressive disease related to pancreatic cancer. No role for surgical intervention. Agree with home hospice once patient able to be discharged. Quality of life should remain primary concern at this point. Laura Vega MD, [facility]

## 2018-07-24 NOTE — PROGRESS NOTES
CM consult noted for hospice session. CM spoke with pt's son Thania Rodriguez and discussed hospice and he chose [facility]. FOC form completed by verbal consent. Referral sent via allscripts to [facility].      Massimo Williamson, [phone]

## 2018-07-24 NOTE — PROGRESS NOTES
Patient discharged home with hospice. Review discharge instructions with family before the patient left. The family verbalized understanding of the discharge instructions and home hospice care. Patient left via [facility].

## 2018-07-24 NOTE — DISCHARGE SUMMARY
Hospitalist Discharge Summary Patient ID: Nicolette Mckinnon [id number] 
43 y.o. 
5/8/1926 PCP on record: Branden Lozano MD 
 
Admit date: 7/19/2018 Discharge date and time: 7/24/2018 DISCHARGE DIAGNOSIS: 
 
See below CONSULTATIONS: 
IP CONSULT TO NEUROLOGY 
IP CONSULT TO PALLIATIVE CARE - PROVIDER 
IP CONSULT TO ONCOLOGY 
IP CONSULT TO ONCOLOGY 
IP CONSULT TO GENERAL SURGERY Excerpted HPI from H&P of Carmelina Willis MD: 
Keith Elizondo is a 80 y.o.  male who presents with Dysarthria and slurred speech after episode of starring. Pt doesn't remember what happened and claims he was watching TV and afterwards doesn't remember what happened. Pt symptoms seems to have resolved and information obtained from ED physician, nursing noted as unable to reach the family at this time. Pt denies any fever, chills, nausea, vomiting, diarrhea, chest pain, problems urination, focal weakness, dizziness or headaches. CT head with No acute findings. Nonspecific white matter changes most common seen with chronic small vessel ischemic and/or senescent change. Intracranial atherosclerosis. 
  
 
______________________________________________________________________ DISCHARGE SUMMARY/HOSPITAL COURSE:  for full details see H&P, daily progress notes, labs, consult notes. Nausea with vomiting, persistent SBO - mildly improved, tolerating PO 
- CT A/P with persistent moderately severe mechanical small bowel obstruction, possibly related to adhesions. Stable colostomy hernia in the left anterior pelvic wall. Bilateral inguinal hernias, with the rectosigmoid stump incidentally coursing into the left inguinal hernia. Stable bilateral lung masses. Stable pancreatic mass. Other stable incidental changes, including a sclerotic lesion of the vertebral body of L2. 
- small bowel follow through today, note that Pt vomited during test as well. Results ending. 
- appreciate surgical consultation - IV fluids due to repeated emesis 
- con't reglan, compazine and zofran for nausea. Pt did not like NG tube last admission, so trying to avoid. - I called and updated family today. Son says that they understand he is no longer candidate for chemotherapy. Would like to relieve bowel obstruction if possible as he is feeling so poorly with nausea and cannot eat, then transition home with hospice - they have friend Rowena noel with [facility] and that would be their first choice. Discussed with CM today. Granddaughter who is very helpful nurse at [facility] is off work tomorrow and will bring son up to help discuss plan of care (son does not drive). 7/24: 
Spoke with family and pt at bedside. They do not want any further testing done, or any procedures to be performed. Advised that pt still have SBO but at least he is tolerating some CLD. Family understand and do not want anything invasive, including placement of an NGT, to be done. Hospice also came to evaluate - and family agrees with home hospice. Have discontinued all meds for the pt, can continue Reglan and Zofran still. Convulsive syncope due to orthostatic hypotension with dehydration/poor po intake in setting of metastatic pancreatic ca:  +dysarthria and slurred speech after episode of starring for few minutes PTA 
- CT head with no acute findings. Nonspecific white matter changes most common seen with chronic small vessel ischemic change. Intracranial atherosclerosis. - MRI brain no evidence of acute process.  MRA brain with no flow limiting stenosis. - carotid dopplers with bilateral <50% stenosis of the internal carotid arteries - echo 7/2 with EF 55-60%. There were no regional wall motion abnormalities. - EEG normal electroencephalogram for the age of 80 years 
- con't to treat medical issues as able 7/24: No further workup. Pt to be discharged home with Hospice. Mild KHUSHBU (resolved) and hyponatremia (resolved) Metastatic pancreatic cancer with mets to lungs and peritoneum: 
- Pt now going home with Hospice - no further plans for chemo Orthostatic hypotension in setting of benign HTN 
-Discontinued all BP meds Hyperlipidemia:  D/c statin due to very advanced age and poor life expectancy GERD (gastroesophageal reflux disease): con't PPI Severe malnutrition: has lost ~15 lbs (7.5%) in the past 3 months due to pancreatic cancer and incarcerated left inguinal hernia with SBO 
- nutrition consulted - pt tolerating CLD  
_______________________________________________________________________ Patient seen and examined by me on discharge day. Pertinent Findings: 
Gen:    Not in distress Chest: Clear lungs CVS:   Regular rhythm. No edema Abd:  Soft, not distended, not tender, LLQ ostomy Neuro:  Alert, limited insight 
_______________________________________________________________________ DISCHARGE MEDICATIONS:  
Current Discharge Medication List  
  
CONTINUE these medications which have NOT CHANGED Details  
ondansetron hcl (ZOFRAN) 4 mg tablet Take 4 mg by mouth every eight (8) hours as needed for Nausea. metoclopramide HCl (REGLAN) 5 mg tablet Take 5 mg by mouth four (4) times daily as needed for Nausea. STOP taking these medications  Omeprazole delayed release (PRILOSEC D/R) 20 mg tablet Comments:  
Reason for Stopping:   
   
 calcium carbonate (TUMS) 200 mg calcium (500 mg) chew Comments:  
Reason for Stopping:   
   
 amLODIPine (NORVASC) 10 mg tablet Comments:  
Reason for Stopping:   
   
 hydroCHLOROthiazide (HYDRODIURIL) 25 mg tablet Comments:  
Reason for Stopping:   
   
 labetalol (NORMODYNE) 200 mg tablet Comments:  
Reason for Stopping:   
   
 pravastatin (PRAVACHOL) 40 mg tablet Comments:  
Reason for Stopping:   
   
 thiamine (VITAMIN B-1) 100 mg tablet Comments:  
Reason for Stopping:   
   
 vitamin E (AQUA GEMS) 400 unit capsule Comments:  
Reason for Stopping:   
   
 Cholecalciferol, Vitamin D3, (VITAMIN D3) 1,000 unit cap Comments:  
Reason for Stopping:   
   
 acetaminophen (TYLENOL EXTRA STRENGTH) 500 mg tablet Comments:  
Reason for Stopping:   
   
 lidocaine-prilocaine (EMLA) topical cream Comments:  
Reason for Stopping: My Recommended Diet, Activity, Wound Care, and follow-up labs are listed in the patient's Discharge Insturctions which I have personally completed and reviewed. ______________________________________________________________________ Risk of deterioration: High 
 
Condition at Discharge:  Stable 
______________________________________________________________________ Disposition Home with hospice services 
______________________________________________________________________ Care Plan discussed with:  
Patient, Family, RN, Care Manager, Consultant 
 
______________________________________________________________________ Code Status: DNR/DNI 
______________________________________________________________________ Follow up with: PCP : Danielle Downing MD 
Follow-up Information Follow up With Details Comments Contact Info Danielle Downing MD   [address] 
[phone] Total time in minutes spent coordinating this discharge (includes going over instructions, follow-up, prescriptions, and preparing report for sign off to her PCP) :  35 minutes Signed: 
Chapis Rodríguez MD

## 2018-08-05 PROCEDURE — 3331090001 HH PPS REVENUE CREDIT

## 2018-08-05 PROCEDURE — 3331090002 HH PPS REVENUE DEBIT

## 2018-08-06 ENCOUNTER — HOME CARE VISIT (OUTPATIENT)
Dept: HOME HEALTH SERVICES | Facility: HOME HEALTH | Age: 83
End: 2018-08-06
Payer: MEDICARE

## 2018-08-08 ENCOUNTER — APPOINTMENT (OUTPATIENT)
Dept: INFUSION THERAPY | Age: 83
End: 2018-08-08

## 2020-11-10 NOTE — PROGRESS NOTES
Reason for Admission:   TIA               RRAT Score:     29             Resources/supports as identified by patient/family:   Pt lives with his son. He has aides through [employer]. Top Challenges facing patient (as identified by patient/family and CM): Finances/Medication cost?      No            Transportation? No. Son drives pt to his appointments. Support system or lack thereof? See above. Living arrangements? See above. Self-care/ADLs/Cognition? Pt is fairly independent with ADL's. However, if he needs help, he has aides M-F from 1pm until 10pm.          Current Advanced Directive/Advance Care Plan: On file                          Plan for utilizing home health:    Pt is open to [facility]. Likelihood of readmission: high                 Transition of Care Plan:       Home with home health and private duty. Lives with son. CM met with pt to introduce him to the role of CM and transition of care. He verbalized understanding. This pt lives at home with his son. He stated that during the weedays, his son works from [employer] until [address]. During that time, he has an aide through  [employer]. He also is open to [facility] for PT and SN. We will need an order to resume these services. CM will follow for any needs. [address] Management Interventions  PCP Verified by CM: Yes  Palliative Care Criteria Met (RRAT>21 & CHF Dx)?: No  Transition of Care Consult (CM Consult): Discharge Planning  MyChart Signup: No  Discharge Durable Medical Equipment: No  Physical Therapy Consult: Yes  Occupational Therapy Consult: Yes  Speech Therapy Consult: Yes  Current Support Network:  Other (Pt's son lives with him.)  Confirm Follow Up Transport: Family  Plan discussed with Pt/Family/Caregiver: Yes  Freedom of Choice Offered: Yes  [address] Provided?: No No

## 2023-02-01 NOTE — ED NOTES
Patient vomited prior to xray and line moved to 45 (opposed to 55 where it should be). Spoke with MD Alejandra Diego after advancing the NG tube back to 54 and reclamping it. Placement confirmed with auscultation by Feilz Alexander RN. MD Alejandra Diego states to repeat KUB xray. MD Lilia Giles at bedside states to remove 14 Swiss NG Tube and provide him with a 18g NG Tube. MD Norma Gerardo will place NG Tube and will reorder xray following. Kenalog Preparation: Kenalog with 1% lidocaine with epinephrine and a 1:10 solution of 8.4% sodium bicarbonate

## (undated) DEVICE — INTENDED FOR TISSUE SEPARATION, AND OTHER PROCEDURES THAT REQUIRE A SHARP SURGICAL BLADE TO PUNCTURE OR CUT.: Brand: BARD-PARKER ® CARBON RIB-BACK BLADES

## (undated) DEVICE — SUTURE PERMAHAND SZ 3-0 L30IN NONABSORBABLE BLK SILK BRAID A304H

## (undated) DEVICE — DUAL LUMEN STOMACH TUBE MULTI-FUNCTIONAL PORT: Brand: SALEM SUMP

## (undated) DEVICE — (D)PREP SKN CHLRAPRP APPL 26ML -- CONVERT TO ITEM 371833

## (undated) DEVICE — NEEDLE HYPO 25GA L1.5IN BVL ORIENTED ECLIPSE

## (undated) DEVICE — SUTURE MCRYL SZ 4-0 L27IN ABSRB UD L19MM PS-2 1/2 CIR PRIM Y426H

## (undated) DEVICE — 3M™ TEGADERM™ TRANSPARENT FILM DRESSING FRAME STYLE, 1626W, 4 IN X 4-3/4 IN (10 CM X 12 CM), 50/CT 4CT/CASE: Brand: 3M™ TEGADERM™

## (undated) DEVICE — HANDLE LT SNAP ON ULT DURABLE LENS FOR TRUMPF ALC DISPOSABLE

## (undated) DEVICE — SUTURE PERMAHAND SZ 2-0 L30IN NONABSORBABLE BLK SILK W/O A305H

## (undated) DEVICE — SOLUTION IV 1000ML 0.9% SOD CHL

## (undated) DEVICE — TOWEL SURG W17XL27IN STD BLU COT NONFENESTRATED PREWASHED

## (undated) DEVICE — 1-PIECE DRAINABLE OSTOMY POUCH, FLEXWEAR: Brand: PREMIER

## (undated) DEVICE — REM POLYHESIVE ADULT PATIENT RETURN ELECTRODE: Brand: VALLEYLAB

## (undated) DEVICE — GOWN ,SIRUS ,NONREINFORCED 4XL: Brand: MEDLINE

## (undated) DEVICE — SUT VCRL 3-0 18IN TIE MP VIO --

## (undated) DEVICE — SUTURE VCRL SZ 3-0 L54IN ABSRB VLT LIGAPAK REEL NDL J205G

## (undated) DEVICE — ROCKER SWITCH PENCIL BLADE ELECTRODE, HOLSTER: Brand: EDGE

## (undated) DEVICE — 1/4 IN. X 18 IN. LENGTH: Brand: SILICONE TUBING, PENROSE DRAIN

## (undated) DEVICE — CATHETERIZATION TRAY FOL 14 FR URIMTR STATLOK SURSTP

## (undated) DEVICE — DEVON™ KNEE AND BODY STRAP 60" X 3" (1.5 M X 7.6 CM): Brand: DEVON

## (undated) DEVICE — KENDALL SCD EXPRESS SLEEVES, KNEE LENGTH, MEDIUM: Brand: KENDALL SCD

## (undated) DEVICE — SUTURE PROL 2-0 L48IN NONABSORBABLE BLU SH L26MM 1/2 CIR 8533H

## (undated) DEVICE — SUTURE VCRL SZ 3-0 L27IN ABSRB VLT L26MM SH 1/2 CIR J316H

## (undated) DEVICE — BLADE ASSEMB CLP HAIR FINE --

## (undated) DEVICE — INFECTION CONTROL KIT SYS

## (undated) DEVICE — OSTOMY POUCH CLAMP: Brand: HOLLISTER

## (undated) DEVICE — STERILE POLYISOPRENE POWDER-FREE SURGICAL GLOVES: Brand: PROTEXIS

## (undated) DEVICE — SUTURE PERMAHAND SZ 3-0 L18IN NONABSORBABLE BLK L26MM SH C013D

## (undated) DEVICE — SURGICAL PROCEDURE PACK BASIN MAJ SET CUST NO CAUT

## (undated) DEVICE — STAPLER WITH DST SERIES TECHNOLOGY: Brand: GIA

## (undated) DEVICE — 1200 GUARD II KIT W/5MM TUBE W/O VAC TUBE: Brand: GUARDIAN

## (undated) DEVICE — LOADING UNIT WITH DST SERIES TECHNOLOGY: Brand: GIA

## (undated) DEVICE — SUTURE PDS II SZ 1 L36IN ABSRB VLT L48MM CTX 1/2 CIR Z371T

## (undated) DEVICE — SOLUTION IRRIG 1000ML H2O STRL BLT

## (undated) DEVICE — DBD-PACK,LAPAROTOMY,2 REINFORCED GOWNS: Brand: MEDLINE